# Patient Record
Sex: FEMALE | Race: BLACK OR AFRICAN AMERICAN | NOT HISPANIC OR LATINO | URBAN - METROPOLITAN AREA
[De-identification: names, ages, dates, MRNs, and addresses within clinical notes are randomized per-mention and may not be internally consistent; named-entity substitution may affect disease eponyms.]

---

## 2022-05-29 ENCOUNTER — INPATIENT (INPATIENT)
Facility: HOSPITAL | Age: 72
LOS: 10 days | Discharge: HOME | DRG: 65 | End: 2022-06-09
Attending: PSYCHIATRY & NEUROLOGY | Admitting: PSYCHIATRY & NEUROLOGY
Payer: MEDICAID

## 2022-05-29 VITALS
RESPIRATION RATE: 18 BRPM | SYSTOLIC BLOOD PRESSURE: 163 MMHG | HEART RATE: 59 BPM | DIASTOLIC BLOOD PRESSURE: 83 MMHG | OXYGEN SATURATION: 98 %

## 2022-05-29 LAB
ALBUMIN SERPL ELPH-MCNC: 4.5 G/DL — SIGNIFICANT CHANGE UP (ref 3.3–5)
ALP SERPL-CCNC: 99 U/L — SIGNIFICANT CHANGE UP (ref 40–120)
ALT FLD-CCNC: 17 U/L — SIGNIFICANT CHANGE UP (ref 10–45)
ANION GAP SERPL CALC-SCNC: 10 MMOL/L — SIGNIFICANT CHANGE UP (ref 5–17)
APTT BLD: 29.1 SEC — SIGNIFICANT CHANGE UP (ref 27.5–35.5)
AST SERPL-CCNC: 24 U/L — SIGNIFICANT CHANGE UP (ref 10–40)
BASOPHILS # BLD AUTO: 0.03 K/UL — SIGNIFICANT CHANGE UP (ref 0–0.2)
BASOPHILS NFR BLD AUTO: 0.5 % — SIGNIFICANT CHANGE UP (ref 0–2)
BILIRUB SERPL-MCNC: 0.5 MG/DL — SIGNIFICANT CHANGE UP (ref 0.2–1.2)
BUN SERPL-MCNC: 17 MG/DL — SIGNIFICANT CHANGE UP (ref 7–23)
CALCIUM SERPL-MCNC: 9.8 MG/DL — SIGNIFICANT CHANGE UP (ref 8.4–10.5)
CHLORIDE SERPL-SCNC: 105 MMOL/L — SIGNIFICANT CHANGE UP (ref 96–108)
CO2 SERPL-SCNC: 27 MMOL/L — SIGNIFICANT CHANGE UP (ref 22–31)
CREAT SERPL-MCNC: 1.17 MG/DL — SIGNIFICANT CHANGE UP (ref 0.5–1.3)
EGFR: 50 ML/MIN/1.73M2 — LOW
EOSINOPHIL # BLD AUTO: 0.07 K/UL — SIGNIFICANT CHANGE UP (ref 0–0.5)
EOSINOPHIL NFR BLD AUTO: 1.1 % — SIGNIFICANT CHANGE UP (ref 0–6)
GLUCOSE SERPL-MCNC: 85 MG/DL — SIGNIFICANT CHANGE UP (ref 70–99)
HCT VFR BLD CALC: 44.1 % — SIGNIFICANT CHANGE UP (ref 34.5–45)
HGB BLD-MCNC: 14.3 G/DL — SIGNIFICANT CHANGE UP (ref 11.5–15.5)
IMM GRANULOCYTES NFR BLD AUTO: 0.3 % — SIGNIFICANT CHANGE UP (ref 0–1.5)
INR BLD: 1.1 — SIGNIFICANT CHANGE UP (ref 0.88–1.16)
LYMPHOCYTES # BLD AUTO: 2.06 K/UL — SIGNIFICANT CHANGE UP (ref 1–3.3)
LYMPHOCYTES # BLD AUTO: 33.1 % — SIGNIFICANT CHANGE UP (ref 13–44)
MCHC RBC-ENTMCNC: 30.2 PG — SIGNIFICANT CHANGE UP (ref 27–34)
MCHC RBC-ENTMCNC: 32.4 GM/DL — SIGNIFICANT CHANGE UP (ref 32–36)
MCV RBC AUTO: 93.2 FL — SIGNIFICANT CHANGE UP (ref 80–100)
MONOCYTES # BLD AUTO: 0.42 K/UL — SIGNIFICANT CHANGE UP (ref 0–0.9)
MONOCYTES NFR BLD AUTO: 6.7 % — SIGNIFICANT CHANGE UP (ref 2–14)
NEUTROPHILS # BLD AUTO: 3.63 K/UL — SIGNIFICANT CHANGE UP (ref 1.8–7.4)
NEUTROPHILS NFR BLD AUTO: 58.3 % — SIGNIFICANT CHANGE UP (ref 43–77)
NRBC # BLD: 0 /100 WBCS — SIGNIFICANT CHANGE UP (ref 0–0)
PLATELET # BLD AUTO: 345 K/UL — SIGNIFICANT CHANGE UP (ref 150–400)
POTASSIUM SERPL-MCNC: 4 MMOL/L — SIGNIFICANT CHANGE UP (ref 3.5–5.3)
POTASSIUM SERPL-SCNC: 4 MMOL/L — SIGNIFICANT CHANGE UP (ref 3.5–5.3)
PROT SERPL-MCNC: 7.8 G/DL — SIGNIFICANT CHANGE UP (ref 6–8.3)
PROTHROM AB SERPL-ACNC: 13.1 SEC — SIGNIFICANT CHANGE UP (ref 10.5–13.4)
RBC # BLD: 4.73 M/UL — SIGNIFICANT CHANGE UP (ref 3.8–5.2)
RBC # FLD: 13.8 % — SIGNIFICANT CHANGE UP (ref 10.3–14.5)
SARS-COV-2 RNA SPEC QL NAA+PROBE: NEGATIVE — SIGNIFICANT CHANGE UP
SODIUM SERPL-SCNC: 142 MMOL/L — SIGNIFICANT CHANGE UP (ref 135–145)
TROPONIN T SERPL-MCNC: 0.01 NG/ML — SIGNIFICANT CHANGE UP (ref 0–0.01)
WBC # BLD: 6.23 K/UL — SIGNIFICANT CHANGE UP (ref 3.8–10.5)
WBC # FLD AUTO: 6.23 K/UL — SIGNIFICANT CHANGE UP (ref 3.8–10.5)

## 2022-05-29 PROCEDURE — 99291 CRITICAL CARE FIRST HOUR: CPT | Mod: 25

## 2022-05-29 PROCEDURE — 93010 ELECTROCARDIOGRAM REPORT: CPT

## 2022-05-29 PROCEDURE — 99221 1ST HOSP IP/OBS SF/LOW 40: CPT

## 2022-05-29 PROCEDURE — 70496 CT ANGIOGRAPHY HEAD: CPT | Mod: 26,MA

## 2022-05-29 PROCEDURE — 70498 CT ANGIOGRAPHY NECK: CPT | Mod: 26,MA

## 2022-05-29 PROCEDURE — 99291 CRITICAL CARE FIRST HOUR: CPT

## 2022-05-29 PROCEDURE — 71045 X-RAY EXAM CHEST 1 VIEW: CPT | Mod: 26

## 2022-05-29 PROCEDURE — 0042T: CPT

## 2022-05-29 RX ORDER — SODIUM CHLORIDE 9 MG/ML
1000 INJECTION INTRAMUSCULAR; INTRAVENOUS; SUBCUTANEOUS ONCE
Refills: 0 | Status: COMPLETED | OUTPATIENT
Start: 2022-05-29 | End: 2022-05-29

## 2022-05-29 RX ORDER — NOREPINEPHRINE BITARTRATE/D5W 8 MG/250ML
0.05 PLASTIC BAG, INJECTION (ML) INTRAVENOUS
Qty: 8 | Refills: 0 | Status: DISCONTINUED | OUTPATIENT
Start: 2022-05-29 | End: 2022-06-01

## 2022-05-29 RX ORDER — HEPARIN SODIUM 5000 [USP'U]/ML
6500 INJECTION INTRAVENOUS; SUBCUTANEOUS ONCE
Refills: 0 | Status: DISCONTINUED | OUTPATIENT
Start: 2022-05-29 | End: 2022-05-29

## 2022-05-29 RX ORDER — HEPARIN SODIUM 5000 [USP'U]/ML
1000 INJECTION INTRAVENOUS; SUBCUTANEOUS
Qty: 25000 | Refills: 0 | Status: DISCONTINUED | OUTPATIENT
Start: 2022-05-29 | End: 2022-05-29

## 2022-05-29 RX ORDER — DEXTROSE 50 % IN WATER 50 %
25 SYRINGE (ML) INTRAVENOUS ONCE
Refills: 0 | Status: COMPLETED | OUTPATIENT
Start: 2022-05-29 | End: 2022-05-29

## 2022-05-29 RX ORDER — HEPARIN SODIUM 5000 [USP'U]/ML
3000 INJECTION INTRAVENOUS; SUBCUTANEOUS EVERY 6 HOURS
Refills: 0 | Status: DISCONTINUED | OUTPATIENT
Start: 2022-05-29 | End: 2022-05-29

## 2022-05-29 RX ORDER — HEPARIN SODIUM 5000 [USP'U]/ML
6500 INJECTION INTRAVENOUS; SUBCUTANEOUS EVERY 6 HOURS
Refills: 0 | Status: DISCONTINUED | OUTPATIENT
Start: 2022-05-29 | End: 2022-05-29

## 2022-05-29 RX ORDER — HEPARIN SODIUM 5000 [USP'U]/ML
1000 INJECTION INTRAVENOUS; SUBCUTANEOUS
Qty: 25000 | Refills: 0 | Status: DISCONTINUED | OUTPATIENT
Start: 2022-05-29 | End: 2022-05-30

## 2022-05-29 RX ORDER — HEPARIN SODIUM 5000 [USP'U]/ML
INJECTION INTRAVENOUS; SUBCUTANEOUS
Qty: 25000 | Refills: 0 | Status: DISCONTINUED | OUTPATIENT
Start: 2022-05-29 | End: 2022-05-29

## 2022-05-29 RX ADMIN — HEPARIN SODIUM 10 UNIT(S)/HR: 5000 INJECTION INTRAVENOUS; SUBCUTANEOUS at 20:32

## 2022-05-29 RX ADMIN — Medication 25 MILLILITER(S): at 18:51

## 2022-05-29 RX ADMIN — Medication 7.82 MICROGRAM(S)/KG/MIN: at 23:05

## 2022-05-29 RX ADMIN — SODIUM CHLORIDE 1000 MILLILITER(S): 9 INJECTION INTRAMUSCULAR; INTRAVENOUS; SUBCUTANEOUS at 22:19

## 2022-05-29 NOTE — ED ADULT NURSE NOTE - OBJECTIVE STATEMENT
GORDO, 71y F, notified for stroke code by EMS, presents to the ED with c/o left sided weakness, left sided facial droop and slurred speech, witnessed by son. As per son at bedside, last known well witnessed at 1430. Minor facial drop noted to left side, and drift on left arm.  Denies chest pain, SOB, LOC, dizziness, lightheadedness, vision changes, numbness, tingling. Pt A&Ox4, speaking in clear/full sentences. Symptoms improving in ED at this time. Immediately brought to CT scan. GORDO, 71y F, notified for stroke code by EMS, presents to the ED with c/o left sided weakness, left sided facial droop and slurred speech, witnessed by son. As per son at bedside, last known well witnessed at 1430. Minor facial drop noted to left side, and drift on left arm.  Denies chest pain, SOB, LOC, dizziness, lightheadedness, vision changes, numbness, tingling, blood thinner use. Pt A&Ox4, speaking in clear/full sentences. Symptoms improving in ED at this time. Immediately brought to CT scan.

## 2022-05-29 NOTE — CONSULT NOTE ADULT - SUBJECTIVE AND OBJECTIVE BOX
**STROKE CODE CONSULT NOTE**    Last known well time/Time of onset of symptoms: 5/29 2:30PM    HPI: 71y Female with no past medical history ith     T(C): --  HR: 59 (05-29-22 @ 17:30) (59 - 59)  BP: 163/83 (05-29-22 @ 17:30) (163/83 - 163/83)  RR: 18 (05-29-22 @ 17:30) (18 - 18)  SpO2: 98% (05-29-22 @ 17:30) (98% - 98%)    PAST MEDICAL & SURGICAL HISTORY:      FAMILY HISTORY:      SOCIAL HISTORY:   Patient lives with *** at ***.   Smoking status:  Drinking:  Drug Use:     ROS: ***  Constitutional: No fever, weight loss or fatigue  Eyes: No eye pain, visual disturbances, or discharge  ENMT:  No difficulty hearing, tinnitus; No sinus or throat pain  Neck: No pain or stiffness  Respiratory: No cough, wheezing, chills or hemoptysis  Cardiovascular: No chest pain, palpitations, shortness of breath, or leg swelling  Gastrointestinal: No abdominal pain. No nausea, vomiting or hematemesis; No diarrhea or constipation. Nohematochezia.  Genitourinary: No dysuria, frequency, hematuria or incontinence  Neurological: As per HPI  Skin: No itching, burning, rashes or lesions   Endocrine: No heat or cold intolerance; No hair loss  Musculoskeletal: No joint pain or swelling; No muscle, back or extremity pain  Heme/Lymph: No easy bruising or bleeding gums    MEDICATIONS  (STANDING):  dextrose 50% Injectable 25 milliLiter(s) IV Push Once    MEDICATIONS  (PRN):    Allergies    No Known Allergies    Intolerances      Vital Signs Last 24 Hrs  T(C): --  T(F): --  HR: 59 (29 May 2022 17:30) (59 - 59)  BP: 163/83 (29 May 2022 17:30) (163/83 - 163/83)  BP(mean): --  RR: 18 (29 May 2022 17:30) (18 - 18)  SpO2: 98% (29 May 2022 17:30) (98% - 98%)    Physical exam:  Constitutional: No acute distress, conversant  Eyes: Anicteric sclerae, moist conjunctivae, see below for CNs  Neck: trachea midline, FROM, supple, no thyromegaly or lymphadenopathy  Cardiovascular: Regular rate and rhythm, no murmurs, rubs, or gallops. No carotid bruits.   Pulmonary: Anterior breath sounds clear bilaterally, no crackles or wheezing. No use of accessory muscles  GI: Abdomen soft, non-distended, non-tender  Extremities: Radial and DP pulses +2, no edema    Neurologic:  -Mental status: Awake, alert, oriented to person, place, and time. Speech is fluent with intact naming, repetition, and comprehension, no dysarthria. Recent and remote memory intact. Follows commands. Attention/concentration intact. Fund of knowledge appropriate.  -Cranial nerves:   II: Visual fields are full to confrontation.  III, IV, VI: Extraocular movements are intact without nystagmus. Pupils equally round and reactive to light  V:  Facial sensation V1-V3 equal and intact   VII: Face is symmetric with normal eye closure and smile  VIII: Hearing is bilaterally intact to finger rub  IX, X: Uvula is midline and soft palate rises symmetrically  XI: Head turning and shoulder shrug are intact.  XII: Tongue protrudes midline  Motor: Normal bulk and tone. No pronator drift. Strength bilateral upper extremity 5/5, bilateral lower extremities 5/5.  Rapid alternating movements intact and symmetric  Sensation: Intact to light touch bilaterally. No neglect or extinction on double simultaneous testing.  Coordination: No dysmetria on finger-to-nose and heel-to-shin bilaterally  Reflexes: Downgoing toes bilaterally   Gait: Narrow gait and steady    NIHSS: **** ASPECT Score: ***** ICH Score: ****** (GCS)    Fingerstick Blood Glucose: CAPILLARY BLOOD GLUCOSE  78 (29 May 2022 18:13)      POCT Blood Glucose.: 78 mg/dL (29 May 2022 17:31)    LABS:                        14.3   6.23  )-----------( 345      ( 29 May 2022 17:36 )             44.1     05-29    142  |  105  |  17  ----------------------------<  85  4.0   |  27  |  1.17    Ca    9.8      29 May 2022 17:36    TPro  7.8  /  Alb  4.5  /  TBili  0.5  /  DBili  x   /  AST  24  /  ALT  17  /  AlkPhos  99  05-29    PT/INR - ( 29 May 2022 17:36 )   PT: 13.1 sec;   INR: 1.10          PTT - ( 29 May 2022 17:36 )  PTT:29.1 sec  CARDIAC MARKERS ( 29 May 2022 17:36 )  x     / 0.01 ng/mL / x     / x     / x              RADIOLOGY & ADDITIONAL STUDIES:      -----------------------------------------------------------------------------------------------------------------  IV-tPA (Y/N):    ***                              Bolus time:    Alteplase Dose Verification w/ RN:  Reason IV-tPA not given: ***    **STROKE CODE CONSULT NOTE**    Last known well time/Time of onset of symptoms: 5/29 2:30PM    HPI: 71y Female with no past medical history, not on any meds, just got off a long flight from [place] this past Wednesday presents with left sided hemiparesis. LKW 2:30 PM today when she was with son. Then around 4pm, family found patient with profound left sided weakness. EMS was called. BP with /100. Per EMS, no movement in the left arm nor leg, left facial droop, and right gaze preference and could not overcome.     On presentation to ED, 163/83 with improving exam. AOx3, answering questions and following commands. Right gaze preference and can easily overcome when attention brought to the left side. LUE with drift, does not hit bed. Minor left facial droop (although son says patient's face at baseline), extinguishes to DST on the left. NIHSS 4.    T(C): --  HR: 59 (05-29-22 @ 17:30) (59 - 59)  BP: 163/83 (05-29-22 @ 17:30) (163/83 - 163/83)  RR: 18 (05-29-22 @ 17:30) (18 - 18)  SpO2: 98% (05-29-22 @ 17:30) (98% - 98%)    PAST MEDICAL & SURGICAL HISTORY:      FAMILY HISTORY:      SOCIAL HISTORY:   Patient lives with *** at ***.   Smoking status:  Drinking:  Drug Use:     ROS: ***  Constitutional: No fever, weight loss or fatigue  Eyes: No eye pain, visual disturbances, or discharge  ENMT:  No difficulty hearing, tinnitus; No sinus or throat pain  Neck: No pain or stiffness  Respiratory: No cough, wheezing, chills or hemoptysis  Cardiovascular: No chest pain, palpitations, shortness of breath, or leg swelling  Gastrointestinal: No abdominal pain. No nausea, vomiting or hematemesis; No diarrhea or constipation. Nohematochezia.  Genitourinary: No dysuria, frequency, hematuria or incontinence  Neurological: As per HPI  Skin: No itching, burning, rashes or lesions   Endocrine: No heat or cold intolerance; No hair loss  Musculoskeletal: No joint pain or swelling; No muscle, back or extremity pain  Heme/Lymph: No easy bruising or bleeding gums    MEDICATIONS  (STANDING):  dextrose 50% Injectable 25 milliLiter(s) IV Push Once    MEDICATIONS  (PRN):    Allergies    No Known Allergies    Intolerances      Vital Signs Last 24 Hrs  T(C): --  T(F): --  HR: 59 (29 May 2022 17:30) (59 - 59)  BP: 163/83 (29 May 2022 17:30) (163/83 - 163/83)  BP(mean): --  RR: 18 (29 May 2022 17:30) (18 - 18)  SpO2: 98% (29 May 2022 17:30) (98% - 98%)    Physical exam:  Constitutional: No acute distress, conversant  Eyes: Anicteric sclerae, moist conjunctivae, see below for CNs  Neck: trachea midline, FROM, supple, no thyromegaly or lymphadenopathy  Cardiovascular: Regular rate and rhythm, no murmurs, rubs, or gallops. No carotid bruits.   Pulmonary: Anterior breath sounds clear bilaterally, no crackles or wheezing. No use of accessory muscles  GI: Abdomen soft, non-distended, non-tender  Extremities: Radial and DP pulses +2, no edema    Neurologic:  -Mental status: Awake, alert, oriented to person, place, and time. Speech is fluent with intact naming, repetition, and comprehension, no dysarthria. Recent and remote memory intact. Follows commands. Attention/concentration intact. Fund of knowledge appropriate.  -Cranial nerves:   II: Visual fields are full to confrontation.  III, IV, VI: Extraocular movements are intact without nystagmus. Pupils equally round and reactive to light  V:  Facial sensation V1-V3 equal and intact   VII: Face is symmetric with normal eye closure and smile  VIII: Hearing is bilaterally intact to finger rub  IX, X: Uvula is midline and soft palate rises symmetrically  XI: Head turning and shoulder shrug are intact.  XII: Tongue protrudes midline  Motor: Normal bulk and tone. No pronator drift. Strength bilateral upper extremity 5/5, bilateral lower extremities 5/5.  Rapid alternating movements intact and symmetric  Sensation: Intact to light touch bilaterally. No neglect or extinction on double simultaneous testing.  Coordination: No dysmetria on finger-to-nose and heel-to-shin bilaterally  Reflexes: Downgoing toes bilaterally   Gait: Narrow gait and steady    NIHSS: **** ASPECT Score: ***** ICH Score: ****** (GCS)    Fingerstick Blood Glucose: CAPILLARY BLOOD GLUCOSE  78 (29 May 2022 18:13)      POCT Blood Glucose.: 78 mg/dL (29 May 2022 17:31)    LABS:                        14.3   6.23  )-----------( 345      ( 29 May 2022 17:36 )             44.1     05-29    142  |  105  |  17  ----------------------------<  85  4.0   |  27  |  1.17    Ca    9.8      29 May 2022 17:36    TPro  7.8  /  Alb  4.5  /  TBili  0.5  /  DBili  x   /  AST  24  /  ALT  17  /  AlkPhos  99  05-29    PT/INR - ( 29 May 2022 17:36 )   PT: 13.1 sec;   INR: 1.10          PTT - ( 29 May 2022 17:36 )  PTT:29.1 sec  CARDIAC MARKERS ( 29 May 2022 17:36 )  x     / 0.01 ng/mL / x     / x     / x              RADIOLOGY & ADDITIONAL STUDIES:      -----------------------------------------------------------------------------------------------------------------  IV-tPA (Y/N):    ***                              Bolus time:    Alteplase Dose Verification w/ RN:  Reason IV-tPA not given: ***    **STROKE CODE CONSULT NOTE**    Last known well time/Time of onset of symptoms: 5/29 2:30PM    HPI: 71y Female with no past medical history, not on any meds, just got off a long flight from [place] this past Wednesday presents with left sided hemiparesis. LKW 2:30 PM today when she was with son (Axel [phone], at bedside). Then around 4pm, family found patient with profound left sided weakness. EMS was called. BP with /100. Per EMS, no movement in the left arm nor leg, left facial droop, and right gaze preference and could not overcome.     On presentation to ED, 163/83 with improving exam. AOx3, answering questions and following commands. Right gaze preference and can easily overcome when attention brought to the left side. LUE with drift, does not hit bed. Minor left facial droop (although son says patient's face at baseline), extinguishes to DST on the left. NIHSS 4. CTH with no hemorrhage. CTP with elevated Tmax in the R M2 region. CTA head and neck with proximal occlusion of two posterior right M2. Patient is a tpa candidate as CTH is negative for hemorrhage, within window and although improving exam, with disabling deficits. While mixing tpa, patient exam improved to an NIHSS 0. Drift no longer present, gaze midline and attending bilaterally without preference, sensation intact b/l without extinguishing on DST. Decision made to not give tpa due to return to baseline. Endovascular neurosurgery consulted regarding RM2 occlusion. Patient currently not a thrombectomy candidate due to NIHSS 0 with no focal deficits.     Patient denies CP, SOB, prior episodes of weakness and numbness. She has b/l LE flushing and "heat" occasionally, but never weakness, numbness nor paresthesia.     T(C): --  HR: 59 (05-29-22 @ 17:30) (59 - 59)  BP: 163/83 (05-29-22 @ 17:30) (163/83 - 163/83)  RR: 18 (05-29-22 @ 17:30) (18 - 18)  SpO2: 98% (05-29-22 @ 17:30) (98% - 98%)    PAST MEDICAL & SURGICAL HISTORY:      FAMILY HISTORY:      SOCIAL HISTORY:   Patient currently visiting from [place], staying with son.   Smoking status:  Drinking:  Drug Use:     ROS:   Constitutional: No fever, weight loss or fatigue  Eyes: No eye pain, visual disturbances, or discharge  ENMT:  No difficulty hearing, tinnitus; No sinus or throat pain  Neck: No pain or stiffness  Respiratory: No cough, wheezing, chills or hemoptysis  Cardiovascular: No chest pain, palpitations, shortness of breath, or leg swelling  Gastrointestinal: No abdominal pain. No nausea, vomiting or hematemesis; No diarrhea or constipation. Nohematochezia.  Genitourinary: No dysuria, frequency, hematuria or incontinence  Neurological: As per HPI    MEDICATIONS  (STANDING):  dextrose 50% Injectable 25 milliLiter(s) IV Push Once    MEDICATIONS  (PRN):    Allergies    No Known Allergies    Intolerances      Vital Signs Last 24 Hrs  T(C): --  T(F): --  HR: 59 (29 May 2022 17:30) (59 - 59)  BP: 163/83 (29 May 2022 17:30) (163/83 - 163/83)  BP(mean): --  RR: 18 (29 May 2022 17:30) (18 - 18)  SpO2: 98% (29 May 2022 17:30) (98% - 98%)    Physical exam:  Constitutional: No acute distress, conversant  Eyes: Anicteric sclerae, moist conjunctivae, see below for CNs  Neck: trachea midline, FROM, supple, no thyromegaly or lymphadenopathy  Cardiovascular: Some PVCs on tele, no afib.   Pulmonary: No increased work of breathing. No use of accessory muscles  GI: Abdomen soft, non-distended, non-tender  Extremities: no edema    Neurologic: on immediate presentation to the ED  -Mental status: Awake, alert, oriented to person, place, and time. Speech is fluent with intact naming, repetition, and comprehension, no dysarthria. Recent and remote memory intact. Follows commands. Attention/concentration intact. Fund of knowledge appropriate.  -Cranial nerves:   II: Visual fields are full to confrontation.  III, IV, VI: Extraocular movements are intact without nystagmus. RIght gaze preference but can cross and bury to the left. Can attend to the left easily when attention brought to that side. Pupils equally round and reactive to light  V:  Facial sensation V1-V3 equal and intact   VII: Mild left facial droop   VIII: Hearing is grossly intact.   Motor: Normal bulk and tone. LUE drift, does not hit bed. All other extremities antigravity without drift.   Sensation: Intact to light touch bilaterally. Extinguishes on the left with DST  Coordination: No dysmetria on finger-to-nose bilaterally  NIHSS: 4      Neurologic: after CT scans, in resus room  -Mental status: Awake, alert, oriented to person, place, and time. Speech is fluent with intact naming, repetition, and comprehension, no dysarthria. Recent and remote memory intact. Follows commands. Attention/concentration intact. Fund of knowledge appropriate.  -Cranial nerves:   II: Visual fields are full to confrontation.  III, IV, VI: Extraocular movements are intact without nystagmus. Pupils equally round and reactive to light  V:  Facial sensation V1-V3 equal and intact   VII: Face is symmetric with normal eye closure and smile  VIII: Hearing is grossly intact bilaterally  XII: Tongue protrudes midline  Motor: Normal bulk and tone. No pronator drift. Strength bilateral upper extremity 5/5, bilateral lower extremities 5/5.  Sensation: Intact to light touch bilaterally. No neglect or extinction on double simultaneous testing.  Coordination: No dysmetria on finger-to-nose bilaterally  NIHSS: 0        Fingerstick Blood Glucose: CAPILLARY BLOOD GLUCOSE  78 (29 May 2022 18:13)      POCT Blood Glucose.: 78 mg/dL (29 May 2022 17:31)    LABS:                        14.3   6.23  )-----------( 345      ( 29 May 2022 17:36 )             44.1     05-29    142  |  105  |  17  ----------------------------<  85  4.0   |  27  |  1.17    Ca    9.8      29 May 2022 17:36    TPro  7.8  /  Alb  4.5  /  TBili  0.5  /  DBili  x   /  AST  24  /  ALT  17  /  AlkPhos  99  05-29    PT/INR - ( 29 May 2022 17:36 )   PT: 13.1 sec;   INR: 1.10          PTT - ( 29 May 2022 17:36 )  PTT:29.1 sec  CARDIAC MARKERS ( 29 May 2022 17:36 )  x     / 0.01 ng/mL / x     / x     / x              RADIOLOGY & ADDITIONAL STUDIES:    < from: CT Brain Stroke Protocol (05.29.22 @ 17:43) >  IMPRESSION:  1. No acute intracranial abnormality.  2. ASPECTS ([place] Stroke Program Early CT Score) is 10.    < end of copied text >  < from: CT Perfusion w/ Maps w/ IV Cont (05.29.22 @ 17:43) >  IMPRESSION:  1. Moderate size area of right MCA distribution acute ischemia in the  distribution of the M2 branch occlusions.  2. No CT perfusion evidence of acute infarction.    < end of copied text >  < from: CT Angio Head w/ IV Cont (05.29.22 @ 17:43) >  IMPRESSION:  1. Proximal occlusion of two posterior right M2 branches consistent with  thrombosis. Paucity of flow in the posterior right MCA distal to this   level.  2. Otherwise patent intracranial arterial system.    < end of copied text >  < from: CT Angio Neck w/ IV Cont (05.29.22 @ 17:43) >  IMPRESSION:  No stenosis or occlusion.    < end of copied text >    -----------------------------------------------------------------------------------------------------------------  IV-tPA (Y/N):    N                              Bolus time:    Alteplase Dose Verification w/ RN:  Reason IV-tPA not given: rapidly improving exam, back to baseline

## 2022-05-29 NOTE — ED PROVIDER NOTE - PHYSICAL EXAMINATION
CONSTITUTIONAL: Well-appearing; in no apparent distress.   HEAD: Normocephalic; atraumatic.   EYES:  conjunctiva and sclera clear  ENT: normal nose; no rhinorrhea; normal pharynx with no erythema or lesions.   NECK: Supple; non-tender;   CARDIOVASCULAR: Normal S1, S2; no murmurs, rubs, or gallops. Regular rate and rhythm.   RESPIRATORY: Breathing easily; breath sounds clear and equal bilaterally; no wheezes, rhonchi, or rales.  GI: Soft; non-distended; non-tender; no palpable organomegaly.   EXT: No cyanosis or edema; N/V intact  SKIN: Normal for age and race; warm; dry; good turgor; no apparent lesions or rash.   NEURO: ? slight L facial droop. Slight drift to LUE with mild tremor to L hand. Sensations intact and equal b/l.   PSYCHOLOGICAL: The patient’s mood and manner are appropriate. CONSTITUTIONAL: Well-appearing; in no apparent distress.   HEAD: Normocephalic; atraumatic.   EYES:  conjunctiva and sclera clear  ENT: Airway patent.   NECK: Supple; non-tender;   CARDIOVASCULAR: Normal S1, S2; no murmurs, rubs, or gallops. Regular rate and rhythm.   RESPIRATORY: Breathing easily; breath sounds clear and equal bilaterally; no wheezes, rhonchi, or rales.  GI: Soft; non-distended; non-tender; no palpable organomegaly.   EXT: No cyanosis or edema; N/V intact  SKIN: Normal for age and race; warm; dry; good turgor; no apparent lesions or rash.   NEURO: A&O x 3, ? slight L facial droop. Slight drift to LUE with mild tremor to L hand. Motor and sensation otherwise intact and equal b/l.   PSYCHOLOGICAL: The patient’s mood and manner are appropriate.

## 2022-05-29 NOTE — PATIENT PROFILE ADULT - FALL HARM RISK - HARM RISK INTERVENTIONS

## 2022-05-29 NOTE — H&P ADULT - HISTORY OF PRESENT ILLNESS
HPI: 71y Female with no past medical history, not on any meds, just got off a long flight from [place] this past Wednesday presents with left sided hemiparesis. LKW 2:30 PM today when she was with son (Axel [phone], at bedside). Then around 4pm, family found patient with profound left sided weakness. EMS was called. BP with /100. Per EMS, no movement in the left arm nor leg, left facial droop, and right gaze preference and could not overcome.     On presentation to ED, 163/83 with improving exam. AOx3, answering questions and following commands. Right gaze preference and can easily overcome when attention brought to the left side. LUE with drift, does not hit bed. Minor left facial droop (although son says patient's face at baseline), extinguishes to DST on the left. NIHSS 4. CTH with no hemorrhage. CTP with elevated Tmax in the R M2 region. CTA head and neck with proximal occlusion of two posterior right M2. Patient is a tpa candidate as CTH is negative for hemorrhage, within window and although improving exam, with disabling deficits. While mixing tpa, patient exam improved to an NIHSS 0. Drift no longer present, gaze midline and attending bilaterally without preference, sensation intact b/l without extinguishing on DST. Decision made to not give tpa due to return to baseline. Endovascular neurosurgery consulted regarding RM2 occlusion. Patient currently not a thrombectomy candidate due to NIHSS 0 with no focal deficits.     Patient denies CP, SOB, prior episodes of weakness and numbness. She has b/l LE flushing and "heat" occasionally, but never weakness, numbness nor paresthesia.     T(C): --  HR: 59 (05-29-22 @ 17:30) (59 - 59)  BP: 163/83 (05-29-22 @ 17:30) (163/83 - 163/83)  RR: 18 (05-29-22 @ 17:30) (18 - 18)  SpO2: 98% (05-29-22 @ 17:30) (98% - 98%)    PAST MEDICAL & SURGICAL HISTORY:      FAMILY HISTORY:      SOCIAL HISTORY:   Patient currently visiting from [place], staying with son.   Smoking status:  Drinking:  Drug Use:     ROS:   Constitutional: No fever, weight loss or fatigue  Eyes: No eye pain, visual disturbances, or discharge  ENMT:  No difficulty hearing, tinnitus; No sinus or throat pain  Neck: No pain or stiffness  Respiratory: No cough, wheezing, chills or hemoptysis  Cardiovascular: No chest pain, palpitations, shortness of breath, or leg swelling  Gastrointestinal: No abdominal pain. No nausea, vomiting or hematemesis; No diarrhea or constipation. Nohematochezia.  Genitourinary: No dysuria, frequency, hematuria or incontinence  Neurological: As per HPI    MEDICATIONS  (STANDING):  dextrose 50% Injectable 25 milliLiter(s) IV Push Once    MEDICATIONS  (PRN):    Allergies    No Known Allergies    Intolerances      Vital Signs Last 24 Hrs  T(C): --  T(F): --  HR: 59 (29 May 2022 17:30) (59 - 59)  BP: 163/83 (29 May 2022 17:30) (163/83 - 163/83)  BP(mean): --  RR: 18 (29 May 2022 17:30) (18 - 18)  SpO2: 98% (29 May 2022 17:30) (98% - 98%)    Physical exam:  Constitutional: No acute distress, conversant  Eyes: Anicteric sclerae, moist conjunctivae, see below for CNs  Neck: trachea midline, FROM, supple, no thyromegaly or lymphadenopathy  Cardiovascular: Some PVCs on tele, no afib.   Pulmonary: No increased work of breathing. No use of accessory muscles  GI: Abdomen soft, non-distended, non-tender  Extremities: no edema    Neurologic: on immediate presentation to the ED  -Mental status: Awake, alert, oriented to person, place, and time. Speech is fluent with intact naming, repetition, and comprehension, no dysarthria. Recent and remote memory intact. Follows commands. Attention/concentration intact. Fund of knowledge appropriate.  -Cranial nerves:   II: Visual fields are full to confrontation.  III, IV, VI: Extraocular movements are intact without nystagmus. RIght gaze preference but can cross and bury to the left. Can attend to the left easily when attention brought to that side. Pupils equally round and reactive to light  V:  Facial sensation V1-V3 equal and intact   VII: Mild left facial droop   VIII: Hearing is grossly intact.   Motor: Normal bulk and tone. LUE drift, does not hit bed. All other extremities antigravity without drift.   Sensation: Intact to light touch bilaterally. Extinguishes on the left with DST  Coordination: No dysmetria on finger-to-nose bilaterally  NIHSS: 4      Neurologic: after CT scans, in resus room  -Mental status: Awake, alert, oriented to person, place, and time. Speech is fluent with intact naming, repetition, and comprehension, no dysarthria. Recent and remote memory intact. Follows commands. Attention/concentration intact. Fund of knowledge appropriate.  -Cranial nerves:   II: Visual fields are full to confrontation.  III, IV, VI: Extraocular movements are intact without nystagmus. Pupils equally round and reactive to light  V:  Facial sensation V1-V3 equal and intact   VII: Face is symmetric with normal eye closure and smile  VIII: Hearing is grossly intact bilaterally  XII: Tongue protrudes midline  Motor: Normal bulk and tone. No pronator drift. Strength bilateral upper extremity 5/5, bilateral lower extremities 5/5.  Sensation: Intact to light touch bilaterally. No neglect or extinction on double simultaneous testing.  Coordination: No dysmetria on finger-to-nose bilaterally  NIHSS: 0        Fingerstick Blood Glucose: CAPILLARY BLOOD GLUCOSE  78 (29 May 2022 18:13)      POCT Blood Glucose.: 78 mg/dL (29 May 2022 17:31)    LABS:                        14.3   6.23  )-----------( 345      ( 29 May 2022 17:36 )             44.1     05-29    142  |  105  |  17  ----------------------------<  85  4.0   |  27  |  1.17    Ca    9.8      29 May 2022 17:36    TPro  7.8  /  Alb  4.5  /  TBili  0.5  /  DBili  x   /  AST  24  /  ALT  17  /  AlkPhos  99  05-29    PT/INR - ( 29 May 2022 17:36 )   PT: 13.1 sec;   INR: 1.10          PTT - ( 29 May 2022 17:36 )  PTT:29.1 sec  CARDIAC MARKERS ( 29 May 2022 17:36 )  x     / 0.01 ng/mL / x     / x     / x              RADIOLOGY & ADDITIONAL STUDIES:    < from: CT Brain Stroke Protocol (05.29.22 @ 17:43) >  IMPRESSION:  1. No acute intracranial abnormality.  2. ASPECTS ([place]ta Stroke Program Early CT Score) is 10.    < end of copied text >  < from: CT Perfusion w/ Maps w/ IV Cont (05.29.22 @ 17:43) >  IMPRESSION:  1. Moderate size area of right MCA distribution acute ischemia in the  distribution of the M2 branch occlusions.  2. No CT perfusion evidence of acute infarction.    < end of copied text >  < from: CT Angio Head w/ IV Cont (05.29.22 @ 17:43) >  IMPRESSION:  1. Proximal occlusion of two posterior right M2 branches consistent with  thrombosis. Paucity of flow in the posterior right MCA distal to this   level.  2. Otherwise patent intracranial arterial system.    < end of copied text >  < from: CT Angio Neck w/ IV Cont (05.29.22 @ 17:43) >  IMPRESSION:  No stenosis or occlusion.    < end of copied text >    -----------------------------------------------------------------------------------------------------------------  IV-tPA (Y/N):    N                              Bolus time:    Alteplase Dose Verification w/ RN:  Reason IV-tPA not given: rapidly improving exam, back to baseline

## 2022-05-29 NOTE — CONSULT NOTE ADULT - ASSESSMENT
71y Female with no past medical history, not on any meds, just got off a long flight from [place] this past Wednesday presents with left sided hemiparesis. LKW 2:30 PM 5/29 when she was with son and was found with left sided weakness around 4 pm. On immediate presentation to the ED, NIHSS 4 which improved to NIHSS 0 after CTH scans. CTH with no hemorrhage. CTP with elevated Tmax in the R M2 region. CTA head and neck with proximal occlusion of two posterior right M2. Patient no longer a tap candidate due to return to baseline. Discussed case with neurovascular. Patient not a thrombectomy candidate at this time.     Discussed plan with patient and son to admit to ICU for close neuro monitoring and possible intervention if she were to neurologically change and IV medication treatment to treat occlusion. Patient does not wish to be admitted to the ICU because she is currently doing well, back at baseline. Educated patient that while she definitely improved, she has a blood clot that will cause her to have a stroke and is a high risk for disabling deficits if the vessel were to occlude or clot to embolize. Also emphasized that she will need blood thinner through the IV and aspirin or any other PO medication would not be enough. In addition, she would still need to be admitted for a stroke work up. Patient would like to discuss with son and other children regarding admission. Some concerns that she has is the cost (as she is visiting and has no insurance) and questions the necessity of ICU.     If patient agrees with admission, plan for:  - q1 neuro checks  - NPO  - hept gtt PTT goal 50-70  - SBP goal 140-180  - SCDs  - will eventually need MRI, TTE with bubble. May need LE doppler, hypercoagulable panel if rest of work up unremarkable.     Stroke risk factors  - A1C:   - LDL:     Discussed with Neurology Attending Dr. Larios    71y Female with no past medical history, not on any meds, just got off a long flight from [place] this past Wednesday presents with left sided hemiparesis. LKW 2:30 PM 5/29 when she was with son and was found with left sided weakness around 4 pm. On immediate presentation to the ED, NIHSS 4 which improved to NIHSS 0 after CTH scans. CTH with no hemorrhage. CTP with elevated Tmax in the R M2 region. CTA head and neck with proximal occlusion of two posterior right M2. Patient no longer a tap candidate due to return to baseline. Discussed case with neurovascular. Patient not a thrombectomy candidate at this time.     Discussed plan with patient and son to admit to ICU for close neuro monitoring and possible intervention if she were to neurologically change and IV medication treatment to treat occlusion. Patient does not wish to be admitted to the ICU because she is currently doing well, back at baseline. Educated patient that while she definitely improved, she has a blood clot that will cause her to have a stroke and is a high risk for disabling deficits if the vessel were to occlude or clot to embolize. Also emphasized that she will need blood thinner through the IV and aspirin or any other PO medication would not be enough. In addition, she would still need to be admitted for a stroke work up. Patient would like to discuss with son and other children regarding admission. Some concerns that she has is the cost (as she is visiting and has no insurance) and questions the necessity of ICU.     If patient agrees with admission, plan for:  - q1 neuro checks  - NPO  - hept gtt PTT goal 50-70  - SBP goal 140-180, start fluids or pressors if need to maintain blood pressure goal  - head of bed flat  - SCDs  - will eventually need MRI, TTE with bubble. May need LE doppler, hypercoagulable panel if rest of work up unremarkable.     Stroke risk factors  - A1C:   - LDL:     Discussed with Neurology Attending Dr. Larios    71y Female with no past medical history, not on any meds, just got off a long flight from [place] this past Wednesday presents with left sided hemiparesis. LKW 2:30 PM 5/29 when she was with son and was found with left sided weakness around 4 pm. On immediate presentation to the ED, NIHSS 4 which improved to NIHSS 0 after CTH scans. CTH with no hemorrhage. CTP with elevated Tmax in the R M2 region. CTA head and neck with proximal occlusion of two posterior right M2. Patient no longer a tap candidate due to return to baseline. Discussed case with neurovascular. Patient not a thrombectomy candidate at this time.     Discussed plan with patient and son to admit to ICU for close neuro monitoring and possible intervention if she were to neurologically change and IV medication treatment to treat occlusion. Patient initially did not wish to be admitted to the ICU because she is currently doing well, back at baseline. Educated patient that while she definitely improved, she has a blood clot that will cause her to have a stroke and is a high risk for disabling deficits if the vessel were to occlude or clot to embolize. Also emphasized that she will need blood thinner through the IV and aspirin or any other PO medication would not be enough. In addition, she would still need to be admitted for a stroke work up. Patient discusssed with son and other children regarding admission. Also had concerns of cost (as she is visiting and has no insurance) and questions the necessity of ICU. After extensive conversation with son (Maxx) at bedside and patient, patient is willing to be admitted to ICU for close neuro monitoring for thrombectomy watch.     1) Admit to MICU for thrombectomy watch   - q1 neuro checks  - NPO  - hept gtt PTT goal 50-70  - STAT HCT, CTA H/N, CTP if acute change in neuro status   - SBP goal 140-180, start fluids or pressors if need to maintain blood pressure goal  - head of bed flat, bedrest   - SCDs  - Stroke Education    2) Labs: Obtain Hemoglobin A1c, Lipid profile set, and TSH    3) Other tests:  - will eventually need MRI, TTE with bubble. May need LE doppler, hypercoagulable panel if rest of work up unremarkable.   - PT/OT order  - Stroke education    4)DVT ppx - SCDs, hep gtt    Discussed with Neurology Attending Dr. Larios

## 2022-05-29 NOTE — ED PROVIDER NOTE - CLINICAL SUMMARY MEDICAL DECISION MAKING FREE TEXT BOX
Impression - 71F brought in for L sided weakness/numbness, slurred speech, facial droop. Stroke code activated on arrival to ED and evaluated by stroke team. CT head negative for acute bleed or infarct. Given improvement of neurological symptoms with minimal deficits noted on exam, decision made to hold tPA and will continue to wait for remainder of lab results. Patient likely to be admitted to stroke service for further w/u. Impression - 71F brought in for L sided weakness/numbness, slurred speech, facial droop. Stroke code activated on arrival to ED and evaluated by stroke team. CT head negative for acute bleed or infarct. Given improvement of neurological symptoms with minimal deficits noted on exam, decision made to hold tPA and will continue to wait for remainder of lab results. Patient likely to be admitted to stroke service for further w/u.     Spoke with Radha Tucker and patient with occlusion of 2 proximal M2 branches of R MCA. Stroke team aware and awaiting recommendations as per neurosurgery. Impression - 71F brought in for L sided weakness/numbness, slurred speech, facial droop. Stroke code activated on arrival to ED and pt evaluated by stroke team. CT head negative for acute bleed or infarct. Given improvement of neurological symptoms with minimal deficits noted on exam, decision made to hold tPA. Will await remainder of labs. Patient to be admitted to stroke service for further w/u.     Spoke with [facility] attending, Dr. Tucker, regarding CTA h/n findings: + occlusion of 2 proximal M2 branches of R MCA. Spoke w/ stroke team and nsg contacted. Given improvement of sx's, will hold on thrombectomy at this time. Pt also undecided about whether to stay for admission; however later agreed to admission. Will start on heparin drip and pt to be admitted to stroke svc for monitoring/ frequent neuro checks.

## 2022-05-29 NOTE — ED PROVIDER NOTE - OBJECTIVE STATEMENT
72 y/o F with no significant PMHx BIBEMS for L sided weakness, numbness, slurred speech, L facial drop, and R gaze preference which was noted by family members at 430pm today, last seen normal at 230PM today. Symptoms appear to be improving on arrival to ED and pt able to speak without difficulty with improved motor function of LUE and LLE. Of note, pt recently travelled from [place] 4d ago. Denies any other complaints. 70 y/o F with no significant PMHx BIBEMS for L sided weakness, numbness, slurred speech, L facial drop, and R gaze preference which was noted by family members at 4:30pm today, last seen normal at 2:30PM today. Symptoms appear to be improving on arrival to ED and pt able to speak without difficulty. Motor function of LUE and LLE improved. Of note, pt recently travelled from [place] 4d ago. Denies any other complaints.

## 2022-05-29 NOTE — CONSULT NOTE ADULT - SUBJECTIVE AND OBJECTIVE BOX
HISTORY OF PRESENT ILLNESS:   HPI: 72 y/o F with no pmh and recent long flight from [place] on Wednesday presents with L hemiparesis, L facial droop and R gaze deviation. LKW 2:30pm 5/29 with son, Maxx. Around 4pm patient was found with left sided weakness. EMS was called and patient was brought to [facility] ED. NIHSS 4 on arrival. CTH with no hemorrhage. CTP with elevated Tmax in the R M2 region. CTA head and neck with proximal occlusion of two posterior right M2. Patient improved to NIHSS 0 so no TPA was given. Neurosurgery consulted for R M2 occlusion.     Patient denies headache, confusion, extremity numbness or weakness and vision changes.     PAST MEDICAL & SURGICAL HISTORY:  unknown  FAMILY HISTORY:  unknown    SOCIAL HISTORY:  unknown    Allergies    No Known Allergies    Intolerances        REVIEW OF SYSTEMS      General:	no recent illnesses, no recent wt gain/loss    Skin/Breast:  intact  	  Ophthalmologic:  negative  	  ENMT:	negative    Respiratory and Thorax: no coughing, wheezing, recent URI  	  Cardiovascular: no chest pain, JEREZ    Gastrointestinal:	soft, non tender    Genitourinary: no frequency, dysuria    Musculoskeletal:	negative    Neurological:	see HPI    Psychiatric:	negative    Hematology/Lymphatics:	negative    Endocrine:  	negative    Allergic/Immunologic:  Negative      MEDICATIONS:  Antibiotics:    Neuro:    Anticoagulation:  heparin  Infusion 1000 Unit(s)/Hr IV Continuous <Continuous>    OTHER:  norepinephrine Infusion 0.05 MICROgram(s)/kG/Min IV Continuous <Continuous>    IVF:      Vital Signs Last 24 Hrs  T(C): 36.1 (29 May 2022 21:00), Max: 36.7 (29 May 2022 20:35)  T(F): 97 (29 May 2022 21:00), Max: 98 (29 May 2022 20:35)  HR: 50 (29 May 2022 22:00) (50 - 66)  BP: 131/58 (29 May 2022 22:00) (131/58 - 170/74)  BP(mean): 84 (29 May 2022 22:00) (84 - 101)  RR: 17 (29 May 2022 22:00) (16 - 31)  SpO2: 98% (29 May 2022 22:00) (97% - 99%)    PHYSICAL EXAM:  General: patient seen laying supine in bed in NAD  Neuro: AAOx3, FC, OE spontaneously, speech clear and fluent, CNII-XI grossly intact, face symmetric, no pronator drift, finger to nose intact, strength 5/5 b/l UE and LE, sensation intact to light touch throughout  HEENT: PERRL, EOMI, VFs intact b/l  Neck: supple  Cardiac: RRR, S1S2  Pulmonary: chest rise symmetric  Abdomen: soft, nontender, nondistended  Ext: perfusing well, DP/PT pulses 2+ b/l  Skin: warm, dry          LABS:                        14.3   6.23  )-----------( 345      ( 29 May 2022 17:36 )             44.1     05-29    142  |  105  |  17  ----------------------------<  85  4.0   |  27  |  1.17    Ca    9.8      29 May 2022 17:36    TPro  7.8  /  Alb  4.5  /  TBili  0.5  /  DBili  x   /  AST  24  /  ALT  17  /  AlkPhos  99  05-29    PT/INR - ( 29 May 2022 17:36 )   PT: 13.1 sec;   INR: 1.10          PTT - ( 29 May 2022 17:36 )  PTT:29.1 sec    CULTURES:      RADIOLOGY & ADDITIONAL STUDIES:  < from: CT Angio Neck w/ IV Cont (05.29.22 @ 17:43) >  IMPRESSION:  No stenosis or occlusion.    < end of copied text >  < from: CT Angio Neck w/ IV Cont (05.29.22 @ 17:43) >    IMPRESSION:  1. Proximal occlusion of two posterior right M2 branches consistent with  thrombosis. Paucity of flow in the posterior right MCA distal to this   level.  2. Otherwise patent intracranial arterial system.    < end of copied text >  < from: CT Perfusion w/ Maps w/ IV Cont (05.29.22 @ 17:43) >  IMPRESSION:  1. Moderate size area of right MCA distribution acute ischemia in the  distribution of the M2 branch occlusions.  2. No CT perfusion evidence of acute infarction.    < end of copied text >    Assessment:   72 y/o F with no pmh and recent long flight from [place] on Wednesday presents with L hemiparesis, L facial droop and R gaze deviation. LKW 2:30pm 5/29. NIHSS improved to 0 in ER and no TPA given.   CTP with elevated Tmax in the R M2 region. CTA head and neck with proximal occlusion of two posterior right M2.        Plan:  - Patient on thrombectomy watch. Please continue neuro stroke and vital checks q1. Please notify neurosurgery with any change in neurological exam.  - Recommend keep -180.  - Continue care per primary team.    D/w Dr. Burgos

## 2022-05-29 NOTE — ED ADULT NURSE REASSESSMENT NOTE - NS ED NURSE REASSESS COMMENT FT1
pt received from [facility] A&Ox4 awake alert appears comfortable respirations even and unlabored NIH currently 1 for left sided slight facial droop. No longer noting left arm drift, not noting extinction/ neglect, no visual gaze palsy noted. MD Ree to bedside speaking with pt and family about plan for admission and why as they were not agreeable to stay as pt's symptoms have improved however now agreeable

## 2022-05-29 NOTE — ED ADULT TRIAGE NOTE - CHIEF COMPLAINT QUOTE
BIBEMS, notification called for LVO stroke code, per EMS pt had L sided facial droop, slurred speech. L sided weakness,  symptoms improving in ED, stroke code activated and pt immediately brought to CT scan.

## 2022-05-29 NOTE — H&P ADULT - ASSESSMENT
71y Female with no past medical history, not on any meds, just got off a long flight from [place] this past Wednesday presents with left sided hemiparesis. LKW 2:30 PM 5/29 when she was with son and was found with left sided weakness around 4 pm. On immediate presentation to the ED, NIHSS 4 which improved to NIHSS 0 after CTH scans. CTH with no hemorrhage. CTP with elevated Tmax in the R M2 region. CTA head and neck with proximal occlusion of two posterior right M2. Patient no longer a tap candidate due to return to baseline. Discussed case with neurovascular. Patient not a thrombectomy candidate at this time.     Discussed plan with patient and son to admit to ICU for close neuro monitoring and possible intervention if she were to neurologically change and IV medication treatment to treat occlusion. Patient initially did not wish to be admitted to the ICU because she is currently doing well, back at baseline. Educated patient that while she definitely improved, she has a blood clot that will cause her to have a stroke and is a high risk for disabling deficits if the vessel were to occlude or clot to embolize. Also emphasized that she will need blood thinner through the IV and aspirin or any other PO medication would not be enough. In addition, she would still need to be admitted for a stroke work up. Patient discusssed with son and other children regarding admission. Also had concerns of cost (as she is visiting and has no insurance) and questions the necessity of ICU. After extensive conversation with son (Maxx) at bedside and patient, patient is willing to be admitted to ICU for close neuro monitoring for thrombectomy watch.     1) Admit to MICU for thrombectomy watch   - q1 neuro checks  - NPO  - hept gtt PTT goal 50-70  - STAT HCT, CTA H/N, CTP if acute change in neuro status   - SBP goal 140-180, start fluids or pressors if need to maintain blood pressure goal  - head of bed flat, bedrest   - SCDs  - Stroke Education    2) Labs: Obtain Hemoglobin A1c, Lipid profile set, and TSH    3) Other tests:  - will eventually need MRI, TTE with bubble. May need LE doppler, hypercoagulable panel if rest of work up unremarkable.   - PT/OT order  - Stroke education    4)DVT ppx - SCDs, hep gtt

## 2022-05-29 NOTE — ED ADULT NURSE NOTE - NSIMPLEMENTINTERV_GEN_ALL_ED
Implemented All Fall Risk Interventions:  [place] to call system. Call bell, personal items and telephone within reach. Instruct patient to call for assistance. Room bathroom lighting operational. Non-slip footwear when patient is off stretcher. Physically safe environment: no spills, clutter or unnecessary equipment. Stretcher in lowest position, wheels locked, appropriate side rails in place. Provide visual cue, wrist band, yellow gown, etc. Monitor gait and stability. Monitor for mental status changes and reorient to person, place, and time. Review medications for side effects contributing to fall risk. Reinforce activity limits and safety measures with patient and family.

## 2022-05-30 LAB
A1C WITH ESTIMATED AVERAGE GLUCOSE RESULT: 5.1 % — SIGNIFICANT CHANGE UP (ref 4–5.6)
ANION GAP SERPL CALC-SCNC: 9 MMOL/L — SIGNIFICANT CHANGE UP (ref 5–17)
APTT BLD: 62.8 SEC — HIGH (ref 27.5–35.5)
APTT BLD: 68.7 SEC — HIGH (ref 27.5–35.5)
APTT BLD: 76.3 SEC — HIGH (ref 27.5–35.5)
BASOPHILS # BLD AUTO: 0.02 K/UL — SIGNIFICANT CHANGE UP (ref 0–0.2)
BASOPHILS NFR BLD AUTO: 0.4 % — SIGNIFICANT CHANGE UP (ref 0–2)
BUN SERPL-MCNC: 12 MG/DL — SIGNIFICANT CHANGE UP (ref 7–23)
CALCIUM SERPL-MCNC: 8.9 MG/DL — SIGNIFICANT CHANGE UP (ref 8.4–10.5)
CHLORIDE SERPL-SCNC: 109 MMOL/L — HIGH (ref 96–108)
CHOLEST SERPL-MCNC: 146 MG/DL — SIGNIFICANT CHANGE UP
CO2 SERPL-SCNC: 23 MMOL/L — SIGNIFICANT CHANGE UP (ref 22–31)
CREAT SERPL-MCNC: 0.9 MG/DL — SIGNIFICANT CHANGE UP (ref 0.5–1.3)
EGFR: 68 ML/MIN/1.73M2 — SIGNIFICANT CHANGE UP
EOSINOPHIL # BLD AUTO: 0.05 K/UL — SIGNIFICANT CHANGE UP (ref 0–0.5)
EOSINOPHIL NFR BLD AUTO: 0.9 % — SIGNIFICANT CHANGE UP (ref 0–6)
ESTIMATED AVERAGE GLUCOSE: 100 MG/DL — SIGNIFICANT CHANGE UP (ref 68–114)
GLUCOSE SERPL-MCNC: 104 MG/DL — HIGH (ref 70–99)
HCT VFR BLD CALC: 42 % — SIGNIFICANT CHANGE UP (ref 34.5–45)
HCV AB S/CO SERPL IA: 0.04 S/CO — SIGNIFICANT CHANGE UP
HCV AB SERPL-IMP: SIGNIFICANT CHANGE UP
HDLC SERPL-MCNC: 70 MG/DL — SIGNIFICANT CHANGE UP
HGB BLD-MCNC: 13.8 G/DL — SIGNIFICANT CHANGE UP (ref 11.5–15.5)
IMM GRANULOCYTES NFR BLD AUTO: 0.2 % — SIGNIFICANT CHANGE UP (ref 0–1.5)
LIPID PNL WITH DIRECT LDL SERPL: 65 MG/DL — SIGNIFICANT CHANGE UP
LYMPHOCYTES # BLD AUTO: 1.37 K/UL — SIGNIFICANT CHANGE UP (ref 1–3.3)
LYMPHOCYTES # BLD AUTO: 24.7 % — SIGNIFICANT CHANGE UP (ref 13–44)
MAGNESIUM SERPL-MCNC: 2.3 MG/DL — SIGNIFICANT CHANGE UP (ref 1.6–2.6)
MCHC RBC-ENTMCNC: 30.5 PG — SIGNIFICANT CHANGE UP (ref 27–34)
MCHC RBC-ENTMCNC: 32.9 GM/DL — SIGNIFICANT CHANGE UP (ref 32–36)
MCV RBC AUTO: 92.7 FL — SIGNIFICANT CHANGE UP (ref 80–100)
MONOCYTES # BLD AUTO: 0.32 K/UL — SIGNIFICANT CHANGE UP (ref 0–0.9)
MONOCYTES NFR BLD AUTO: 5.8 % — SIGNIFICANT CHANGE UP (ref 2–14)
NEUTROPHILS # BLD AUTO: 3.78 K/UL — SIGNIFICANT CHANGE UP (ref 1.8–7.4)
NEUTROPHILS NFR BLD AUTO: 68 % — SIGNIFICANT CHANGE UP (ref 43–77)
NON HDL CHOLESTEROL: 76 MG/DL — SIGNIFICANT CHANGE UP
NRBC # BLD: 0 /100 WBCS — SIGNIFICANT CHANGE UP (ref 0–0)
PHOSPHATE SERPL-MCNC: 3.1 MG/DL — SIGNIFICANT CHANGE UP (ref 2.5–4.5)
PLATELET # BLD AUTO: 288 K/UL — SIGNIFICANT CHANGE UP (ref 150–400)
POTASSIUM SERPL-MCNC: 3.8 MMOL/L — SIGNIFICANT CHANGE UP (ref 3.5–5.3)
POTASSIUM SERPL-SCNC: 3.8 MMOL/L — SIGNIFICANT CHANGE UP (ref 3.5–5.3)
RBC # BLD: 4.53 M/UL — SIGNIFICANT CHANGE UP (ref 3.8–5.2)
RBC # FLD: 13.8 % — SIGNIFICANT CHANGE UP (ref 10.3–14.5)
SODIUM SERPL-SCNC: 141 MMOL/L — SIGNIFICANT CHANGE UP (ref 135–145)
TRIGL SERPL-MCNC: 57 MG/DL — SIGNIFICANT CHANGE UP
TSH SERPL-MCNC: 1.04 UIU/ML — SIGNIFICANT CHANGE UP (ref 0.27–4.2)
WBC # BLD: 5.55 K/UL — SIGNIFICANT CHANGE UP (ref 3.8–10.5)
WBC # FLD AUTO: 5.55 K/UL — SIGNIFICANT CHANGE UP (ref 3.8–10.5)

## 2022-05-30 PROCEDURE — 99291 CRITICAL CARE FIRST HOUR: CPT

## 2022-05-30 PROCEDURE — 93970 EXTREMITY STUDY: CPT | Mod: 26

## 2022-05-30 PROCEDURE — 70450 CT HEAD/BRAIN W/O DYE: CPT | Mod: 26

## 2022-05-30 RX ORDER — HEPARIN SODIUM 5000 [USP'U]/ML
900 INJECTION INTRAVENOUS; SUBCUTANEOUS
Qty: 25000 | Refills: 0 | Status: DISCONTINUED | OUTPATIENT
Start: 2022-05-30 | End: 2022-05-30

## 2022-05-30 RX ORDER — HEPARIN SODIUM 5000 [USP'U]/ML
750 INJECTION INTRAVENOUS; SUBCUTANEOUS
Qty: 25000 | Refills: 0 | Status: DISCONTINUED | OUTPATIENT
Start: 2022-05-30 | End: 2022-06-03

## 2022-05-30 RX ORDER — SODIUM CHLORIDE 9 MG/ML
1000 INJECTION INTRAMUSCULAR; INTRAVENOUS; SUBCUTANEOUS ONCE
Refills: 0 | Status: COMPLETED | OUTPATIENT
Start: 2022-05-30 | End: 2022-05-30

## 2022-05-30 RX ADMIN — SODIUM CHLORIDE 1000 MILLILITER(S): 9 INJECTION INTRAMUSCULAR; INTRAVENOUS; SUBCUTANEOUS at 23:31

## 2022-05-30 NOTE — PROGRESS NOTE ADULT - SUBJECTIVE AND OBJECTIVE BOX
MACRINA VALDEZ, 71y, Female  MRN-[id number]  Patient is a 71y old  Female who presents with a chief complaint of     OVERNIGHT EVENTS: NAEO     SUBJECTIVE: Pt seen/examined at bedside. Per patient, she has noticed no worsening in her neurologic symptoms. She states that she feels as though she is functioning at her baseline level. No acute changes noticed by patient and she denies any weakness, numbness, tingling.     12 Point ROS Negative unless noted otherwise above.  -------------------------------------------------------------------------------  VITAL SIGNS:  Vital Signs Last 24 Hrs  T(C): 36.7 (30 May 2022 09:29), Max: 36.7 (29 May 2022 20:35)  T(F): 98.1 (30 May 2022 09:29), Max: 98.1 (30 May 2022 09:29)  HR: 57 (30 May 2022 09:00) (50 - 67)  BP: 153/67 (30 May 2022 09:00) (125/60 - 187/77)  BP(mean): 97 (30 May 2022 09:00) (84 - 111)  RR: 16 (30 May 2022 09:00) (16 - 31)  SpO2: 97% (30 May 2022 09:00) (97% - 99%)  I&O's Summary    29 May 2022 07:01  -  30 May 2022 07:00  --------------------------------------------------------  IN: 1128.1 mL / OUT: 0 mL / NET: 1128.1 mL    30 May 2022 07:01  -  30 May 2022 10:14  --------------------------------------------------------  IN: 31.5 mL / OUT: 0 mL / NET: 31.5 mL        PHYSICAL EXAM:    General: laying in bed; speaking in full sentences   HEENT: NC/AT  Neck: supple, trachea midline  Cardiovascular: RRR, +S1/S2; NO M/R/G  Respiratory: CTA B/L; no W/R/R  Gastrointestinal: soft, NT/ND; +BSx4  Extremities: WWP; no edema or cyanosis  Vascular: 2+ radial, DP/PT pulses B/L  Neurological: AAOx3; no focal deficits    ALLERGIES:  Allergies    No Known Allergies    Intolerances        MEDICATIONS:  MEDICATIONS  (STANDING):  heparin  Infusion 750 Unit(s)/Hr (7.5 mL/Hr) IV Continuous <Continuous>  norepinephrine Infusion 0.05 MICROgram(s)/kG/Min (7.82 mL/Hr) IV Continuous <Continuous>    MEDICATIONS  (PRN):      -------------------------------------------------------------------------------  LABS:                        13.8   5.55  )-----------( 288      ( 30 May 2022 06:17 )             42.0     05-30    141  |  109<H>  |  12  ----------------------------<  104<H>  3.8   |  23  |  0.90    Ca    8.9      30 May 2022 06:17  Phos  3.1     05-30  Mg     2.3     05-30    TPro  7.8  /  Alb  4.5  /  TBili  0.5  /  DBili  x   /  AST  24  /  ALT  17  /  AlkPhos  99  05-29    LIVER FUNCTIONS - ( 29 May 2022 17:36 )  Alb: 4.5 g/dL / Pro: 7.8 g/dL / ALK PHOS: 99 U/L / ALT: 17 U/L / AST: 24 U/L / GGT: x           PT/INR - ( 29 May 2022 17:36 )   PT: 13.1 sec;   INR: 1.10          PTT - ( 30 May 2022 06:17 )  PTT:76.3 sec    CAPILLARY BLOOD GLUCOSE  78 (29 May 2022 18:13)      POCT Blood Glucose.: 78 mg/dL (29 May 2022 17:31)      COVID-19 PCR: Negative (29 May 2022 19:30)      RADIOLOGY & ADDITIONAL TESTS: Reviewed.

## 2022-05-30 NOTE — PHYSICAL THERAPY INITIAL EVALUATION ADULT - PATIENT/FAMILY/SIGNIFICANT OTHER GOALS STATEMENT, PT EVAL
Pt. would like to be able to return to all her prior daily activities and to be able to perform them independently.

## 2022-05-30 NOTE — PHYSICAL THERAPY INITIAL EVALUATION ADULT - ADDITIONAL COMMENTS
Pt. 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 Pt. is currently visiting in [facility], no MARICEL.

## 2022-05-30 NOTE — PHYSICAL THERAPY INITIAL EVALUATION ADULT - GAIT DEVIATIONS NOTED, PT EVAL
decreased single limb stance and decreased L LE clearance/decreased hardeep/increased time in double stance

## 2022-05-30 NOTE — OCCUPATIONAL THERAPY INITIAL EVALUATION ADULT - PERTINENT HX OF CURRENT PROBLEM, REHAB EVAL
Presents with L hemiparesis, L facial droop and R gaze deviation. S/P Stroke code (+no tPA). CT Angio - +Proximal occlusion of two posterior right M2 branches consistent with thrombosis. Moderate size area of right MCA distribution acute ischemia in the distribution of the M2 branch occlusions.

## 2022-05-30 NOTE — PROGRESS NOTE ADULT - ASSESSMENT
71y Female with no past medical history, not on any meds, just got off a long flight from [place] this past Wednesday presents with left sided hemiparesis. LKW 2:30 PM 5/29 when she was with son and was found with left sided weakness around 4 pm. On immediate presentation to the ED, NIHSS 4 which improved to NIHSS 0 after CTH scans. CTH with no hemorrhage. CTP with elevated Tmax in the R M2 region. CTA head and neck with proximal occlusion of two posterior right M2. Patient no longer a tap candidate due to return to baseline. Discussed case with neurovascular. Patient not a thrombectomy candidate at this time.     Discussed plan with patient and son to admit to ICU for close neuro monitoring and possible intervention if she were to neurologically change and IV medication treatment to treat occlusion. Patient initially did not wish to be admitted to the ICU because she is currently doing well, back at baseline. Educated patient that while she definitely improved, she has a blood clot that will cause her to have a stroke and is a high risk for disabling deficits if the vessel were to occlude or clot to embolize. Also emphasized that she will need blood thinner through the IV and aspirin or any other PO medication would not be enough. In addition, she would still need to be admitted for a stroke work up. Patient discusssed with son and other children regarding admission. Also had concerns of cost (as she is visiting and has no insurance) and questions the necessity of ICU. After extensive conversation with son (Maxx) at bedside and patient, patient is willing to be admitted to ICU for close neuro monitoring for thrombectomy watch.     1) Admitted to MICU for thrombectomy watch   - q1 neuro checks to continue at this time   - NPO  - hept gtt PTT goal 50-70  - STAT HCT, CTA H/N, CTP if acute change in neuro status   - SBP goal 140-180, start fluids or pressors if need to maintain blood pressure goal. Patient started on pressors overnight to maintain SBP >140   - head of bed flat, bedrest   - SCDs  - Stroke Education    3) Other tests:  - will eventually need MRI, TTE with bubble. May need LE doppler, hypercoagulable panel if rest of work up unremarkable.   - PT/OT order  - Stroke education    4)DVT ppx - SCDs, hep gtt

## 2022-05-30 NOTE — OCCUPATIONAL THERAPY INITIAL EVALUATION ADULT - BALANCE DISTURBANCE, IDENTIFIED IMPAIRMENT CONTRIBUTE, REHAB EVAL
impaired coordination/abnormal muscle tone/impaired postural control/decreased ROM/decreased strength

## 2022-05-30 NOTE — PHYSICAL THERAPY INITIAL EVALUATION ADULT - PERTINENT HX OF CURRENT PROBLEM, REHAB EVAL
Pt. is a 71 y.o R hand dominant female presenting with sudden onset L sided hemiplegia, L facial droop, R gaze preference; found to have R M2 occlusion with subsequent spontaneous improvement in symptoms, decision was made against administering tPA as pt was downgraded NISS=0. Pt. currently on low dose Levo to maintain cerebral perfusion with SBP >140. Pt. cleared for PT by stroke ACP.

## 2022-05-30 NOTE — OCCUPATIONAL THERAPY INITIAL EVALUATION ADULT - ADDITIONAL COMMENTS
Pt resides in [facility], currently visiting w/ her son in [facility]. Pt describes herself as fully independent up to the time of incident. Does not use AEs/DMEs.

## 2022-05-30 NOTE — OCCUPATIONAL THERAPY INITIAL EVALUATION ADULT - GENERAL OBSERVATIONS, REHAB EVAL
Pt's ASHLY Meza aware of intent to eval/tx; cleared Pt. Pt received in supine - +telemetry, IVs, heplock, b/l scds. Pt's son at bedside. PT Martha present.

## 2022-05-30 NOTE — OCCUPATIONAL THERAPY INITIAL EVALUATION ADULT - PLANNED THERAPY INTERVENTIONS, OT EVAL
ADL retraining/IADL retraining/balance training/bed mobility training/fine motor coordination training/motor coordination training/neuromuscular re-education/parent/caregiver training.../ROM/strengthening/transfer training

## 2022-05-30 NOTE — OCCUPATIONAL THERAPY INITIAL EVALUATION ADULT - MD ORDER
71y Female with no past medical history, not on any meds, just got off a long flight from [place] this past Wednesday presents with left sided hemiparesis. LKW 2:30 PM 5/29 when she was with son and was found with left sided weakness around 4 pm. On immediate presentation to the ED, NIHSS 4 which improved to NIHSS 0 after CTH scans. CTH with no hemorrhage. CTP with elevated Tmax in the R M2 region. CTA head and neck with proximal occlusion of two posterior right M2.

## 2022-05-30 NOTE — PHYSICAL THERAPY INITIAL EVALUATION ADULT - MODALITIES TREATMENT COMMENTS
Facial strength, symmetry, sensation intact, vision intact to confrontation, gaze convergence intact; smooth pursuits - pt able to track in all directions, but movement is fragmented - ? i/s/o of decreased conceptual following of command; shoulder shrug 5/5, tongue protrudes at midline; hearing intact bilat;  speech fluent w/o word finding difficulties.

## 2022-05-31 LAB
ALBUMIN SERPL ELPH-MCNC: 4.1 G/DL — SIGNIFICANT CHANGE UP (ref 3.3–5)
ALP SERPL-CCNC: 95 U/L — SIGNIFICANT CHANGE UP (ref 40–120)
ALT FLD-CCNC: 14 U/L — SIGNIFICANT CHANGE UP (ref 10–45)
ANION GAP SERPL CALC-SCNC: 12 MMOL/L — SIGNIFICANT CHANGE UP (ref 5–17)
APTT BLD: 60.5 SEC — HIGH (ref 27.5–35.5)
AST SERPL-CCNC: 18 U/L — SIGNIFICANT CHANGE UP (ref 10–40)
BASOPHILS # BLD AUTO: 0.02 K/UL — SIGNIFICANT CHANGE UP (ref 0–0.2)
BASOPHILS NFR BLD AUTO: 0.4 % — SIGNIFICANT CHANGE UP (ref 0–2)
BILIRUB SERPL-MCNC: 1.1 MG/DL — SIGNIFICANT CHANGE UP (ref 0.2–1.2)
BLD GP AB SCN SERPL QL: NEGATIVE — SIGNIFICANT CHANGE UP
BUN SERPL-MCNC: 11 MG/DL — SIGNIFICANT CHANGE UP (ref 7–23)
CALCIUM SERPL-MCNC: 9.3 MG/DL — SIGNIFICANT CHANGE UP (ref 8.4–10.5)
CHLORIDE SERPL-SCNC: 107 MMOL/L — SIGNIFICANT CHANGE UP (ref 96–108)
CO2 SERPL-SCNC: 23 MMOL/L — SIGNIFICANT CHANGE UP (ref 22–31)
CREAT SERPL-MCNC: 0.8 MG/DL — SIGNIFICANT CHANGE UP (ref 0.5–1.3)
EGFR: 79 ML/MIN/1.73M2 — SIGNIFICANT CHANGE UP
EOSINOPHIL # BLD AUTO: 0.08 K/UL — SIGNIFICANT CHANGE UP (ref 0–0.5)
EOSINOPHIL NFR BLD AUTO: 1.5 % — SIGNIFICANT CHANGE UP (ref 0–6)
GLUCOSE SERPL-MCNC: 100 MG/DL — HIGH (ref 70–99)
HCT VFR BLD CALC: 43.8 % — SIGNIFICANT CHANGE UP (ref 34.5–45)
HGB BLD-MCNC: 14.3 G/DL — SIGNIFICANT CHANGE UP (ref 11.5–15.5)
IMM GRANULOCYTES NFR BLD AUTO: 0.2 % — SIGNIFICANT CHANGE UP (ref 0–1.5)
LYMPHOCYTES # BLD AUTO: 1.92 K/UL — SIGNIFICANT CHANGE UP (ref 1–3.3)
LYMPHOCYTES # BLD AUTO: 35.9 % — SIGNIFICANT CHANGE UP (ref 13–44)
MAGNESIUM SERPL-MCNC: 2.1 MG/DL — SIGNIFICANT CHANGE UP (ref 1.6–2.6)
MCHC RBC-ENTMCNC: 30.2 PG — SIGNIFICANT CHANGE UP (ref 27–34)
MCHC RBC-ENTMCNC: 32.6 GM/DL — SIGNIFICANT CHANGE UP (ref 32–36)
MCV RBC AUTO: 92.4 FL — SIGNIFICANT CHANGE UP (ref 80–100)
MONOCYTES # BLD AUTO: 0.36 K/UL — SIGNIFICANT CHANGE UP (ref 0–0.9)
MONOCYTES NFR BLD AUTO: 6.7 % — SIGNIFICANT CHANGE UP (ref 2–14)
NEUTROPHILS # BLD AUTO: 2.96 K/UL — SIGNIFICANT CHANGE UP (ref 1.8–7.4)
NEUTROPHILS NFR BLD AUTO: 55.3 % — SIGNIFICANT CHANGE UP (ref 43–77)
NRBC # BLD: 0 /100 WBCS — SIGNIFICANT CHANGE UP (ref 0–0)
PHOSPHATE SERPL-MCNC: 3.8 MG/DL — SIGNIFICANT CHANGE UP (ref 2.5–4.5)
PLATELET # BLD AUTO: 348 K/UL — SIGNIFICANT CHANGE UP (ref 150–400)
POTASSIUM SERPL-MCNC: 3.8 MMOL/L — SIGNIFICANT CHANGE UP (ref 3.5–5.3)
POTASSIUM SERPL-SCNC: 3.8 MMOL/L — SIGNIFICANT CHANGE UP (ref 3.5–5.3)
PROT SERPL-MCNC: 7.4 G/DL — SIGNIFICANT CHANGE UP (ref 6–8.3)
RBC # BLD: 4.74 M/UL — SIGNIFICANT CHANGE UP (ref 3.8–5.2)
RBC # FLD: 13.5 % — SIGNIFICANT CHANGE UP (ref 10.3–14.5)
RH IG SCN BLD-IMP: POSITIVE — SIGNIFICANT CHANGE UP
SODIUM SERPL-SCNC: 142 MMOL/L — SIGNIFICANT CHANGE UP (ref 135–145)
WBC # BLD: 5.35 K/UL — SIGNIFICANT CHANGE UP (ref 3.8–10.5)
WBC # FLD AUTO: 5.35 K/UL — SIGNIFICANT CHANGE UP (ref 3.8–10.5)

## 2022-05-31 PROCEDURE — 99232 SBSQ HOSP IP/OBS MODERATE 35: CPT

## 2022-05-31 PROCEDURE — 93306 TTE W/DOPPLER COMPLETE: CPT | Mod: 26

## 2022-05-31 PROCEDURE — 99233 SBSQ HOSP IP/OBS HIGH 50: CPT

## 2022-05-31 RX ORDER — POLYETHYLENE GLYCOL 3350 17 G/17G
17 POWDER, FOR SOLUTION ORAL DAILY
Refills: 0 | Status: DISCONTINUED | OUTPATIENT
Start: 2022-05-31 | End: 2022-06-09

## 2022-05-31 RX ORDER — ACETAZOLAMIDE 250 MG/1
1000 TABLET ORAL ONCE
Refills: 0 | Status: COMPLETED | OUTPATIENT
Start: 2022-05-31 | End: 2022-05-31

## 2022-05-31 RX ORDER — MIDODRINE HYDROCHLORIDE 2.5 MG/1
5 TABLET ORAL EVERY 8 HOURS
Refills: 0 | Status: DISCONTINUED | OUTPATIENT
Start: 2022-05-31 | End: 2022-06-01

## 2022-05-31 RX ADMIN — MIDODRINE HYDROCHLORIDE 5 MILLIGRAM(S): 2.5 TABLET ORAL at 12:22

## 2022-05-31 RX ADMIN — MIDODRINE HYDROCHLORIDE 5 MILLIGRAM(S): 2.5 TABLET ORAL at 19:26

## 2022-05-31 RX ADMIN — HEPARIN SODIUM 7.5 UNIT(S)/HR: 5000 INJECTION INTRAVENOUS; SUBCUTANEOUS at 21:57

## 2022-05-31 RX ADMIN — Medication 7.82 MICROGRAM(S)/KG/MIN: at 21:59

## 2022-05-31 RX ADMIN — Medication 7.82 MICROGRAM(S)/KG/MIN: at 21:56

## 2022-05-31 RX ADMIN — HEPARIN SODIUM 7.5 UNIT(S)/HR: 5000 INJECTION INTRAVENOUS; SUBCUTANEOUS at 21:59

## 2022-05-31 RX ADMIN — POLYETHYLENE GLYCOL 3350 17 GRAM(S): 17 POWDER, FOR SOLUTION ORAL at 12:22

## 2022-05-31 NOTE — DIETITIAN INITIAL EVALUATION ADULT - ADD RECOMMEND
1. Continue with current diet order (monitor need for ONS) 2. Encourage pt to meet nutritional needs as able 3. Encourage adherence to diet education (reinforce as able) 4. Pain and bowel regimen per team 5. Will continue to assess/honor preferences as able 6. Monitor weight changes/trends

## 2022-05-31 NOTE — DIETITIAN INITIAL EVALUATION ADULT - CALCULATED TO (G/KG)
Checked patient's blood sugar and was noted to be 29, rechecked immediately on
other hand and blood sugar was 32. Patient was alert and denies shakiness,
just states that he is hungry. 1 amp of D50 administered. 83.76

## 2022-05-31 NOTE — CONSULT NOTE ADULT - SUBJECTIVE AND OBJECTIVE BOX
**STROKE CONSULT NOTE**    HPI: cLKW 2:30pm 5/29 with son, Maxx. Around 4pm patient was found with left sided weakness. EMS was called and patient was brought to [facility] ED. Patient states she fell on her left side a couple hours prior to coming into the hospital. NIHSS 4 on arrival. Initial CTH without acute abnormality. CTP with elevated Tmax in the R M2 region. CTA head and neck with proximal occlusion of two posterior right M2. Patient improved to NIHSS 0 so no TPA was given. Repeat CTH on 5/29 with small hypodensity in right insula suspicious for acute infarct.     Patient seen and examined at bedside today with family present. Patient denies any acute neurological complaints. Feels at baseline. Denies focal numbness or weakness.     T(C): 36.8 (05-31-22 @ 09:04), Max: 37 (05-30-22 @ 17:36)  HR: 63 (05-31-22 @ 09:00) (55 - 67)  BP: 167/75 (05-31-22 @ 09:00) (109/49 - 182/82)  RR: 20 (05-31-22 @ 09:00) (13 - 29)  SpO2: 99% (05-31-22 @ 09:00) (95% - 100%)    PAST MEDICAL & SURGICAL HISTORY:  No pertinent past medical history      No significant past surgical history          FAMILY HISTORY:    ROS:   see HPI    MEDICATIONS  (STANDING):  heparin  Infusion 750 Unit(s)/Hr (7.5 mL/Hr) IV Continuous <Continuous>  norepinephrine Infusion 0.05 MICROgram(s)/kG/Min (7.82 mL/Hr) IV Continuous <Continuous>  polyethylene glycol 3350 17 Gram(s) Oral daily    MEDICATIONS  (PRN):    Allergies    No Known Allergies    Intolerances      Vital Signs Last 24 Hrs  T(C): 36.8 (31 May 2022 09:04), Max: 37 (30 May 2022 17:36)  T(F): 98.3 (31 May 2022 09:04), Max: 98.6 (30 May 2022 17:36)  HR: 63 (31 May 2022 09:00) (55 - 67)  BP: 167/75 (31 May 2022 09:00) (109/49 - 182/82)  BP(mean): 108 (31 May 2022 09:00) (71 - 118)  RR: 20 (31 May 2022 09:00) (13 - 29)  SpO2: 99% (31 May 2022 09:00) (95% - 100%)    Physical exam:  Constitutional: No acute distress, conversant    Neurologic:  -Mental status: Awake, alert, oriented to person, place, and time. Speech is fluent with intact naming, repetition, and comprehension, no dysarthria. Recent and remote memory intact. Follows commands. Attention/concentration intact.   -Cranial nerves:   II: Visual fields are full to confrontation.  III, IV, VI: Extraocular movements are intact without nystagmus. Pupils equally round and reactive to light  V:  Facial sensation V1-V3 equal and intact   VII: Face is symmetric with normal eye closure and smile  VIII: Hearing is bilaterally grossly intact to voice  XII: Tongue protrudes midline  Motor: Normal bulk and tone. No pronator drift. Strength bilateral upper extremity 5/5, bilateral lower extremities 5/5.  Sensation: Intact to light touch bilaterally. No neglect or extinction on double simultaneous testing.  Coordination: No dysmetria on finger-to-nose bilaterally    Fingerstick Blood Glucose: CAPILLARY BLOOD GLUCOSE  78 (29 May 2022 18:13)      POCT Blood Glucose.: 78 mg/dL (29 May 2022 17:31)    LABS:                        14.3   5.35  )-----------( 348      ( 31 May 2022 05:07 )             43.8     05-31    142  |  107  |  11  ----------------------------<  100<H>  3.8   |  23  |  0.80    Ca    9.3      31 May 2022 05:07  Phos  3.8     05-31  Mg     2.1     05-31    TPro  7.4  /  Alb  4.1  /  TBili  1.1  /  DBili  x   /  AST  18  /  ALT  14  /  AlkPhos  95  05-31    PT/INR - ( 29 May 2022 17:36 )   PT: 13.1 sec;   INR: 1.10          PTT - ( 31 May 2022 05:07 )  PTT:60.5 sec  CARDIAC MARKERS ( 29 May 2022 17:36 )  x     / 0.01 ng/mL / x     / x     / x          RADIOLOGY & ADDITIONAL STUDIES:  CTH 5/29: no acute abnormality  CTP: IMPRESSION:  1. Moderate size area of right MCA distribution acute ischemia in the  distribution of the M2 branch occlusions.  2. No CT perfusion evidence of acute infarction.    COMMENTS:  Per perfusion analysis software, Tmax &gt; 6 sec is ischemic tissue   volume. CBF &lt;  30% is infarct core volume. Tmax &gt; 10 sec is poor collateral flow or   severe  delayed perfusion volume.    CTA: IMPRESSION:  1. Proximal occlusion of two posterior right M2 branches consistent with  thrombosis. Paucity of flow in the posterior right MCA distal to this   level.  2. Otherwise patent intracranial arterial system.    CTH: 5/30: Interval development of a small focal hypodensity in the right insula   suspicious for acute infarction. No mass effect or hemorrhagic   transformation.    **STROKE CONSULT NOTE**    HPI: cLKW 2:30pm 5/29 with son, Maxx. Around 4pm patient was found with left sided weakness. EMS was called and patient was brought to [facility] ED. Patient states she fell on her left side a couple hours prior to coming into the hospital. NIHSS 4 on arrival. Initial CTH without acute abnormality. CTP with elevated Tmax in the R M2 region. CTA head and neck with proximal occlusion of two posterior right M2. Patient improved to NIHSS 0 so no TPA was given. Repeat CTH on 5/29 with small hypodensity in right insula suspicious for acute infarct.     Patient seen and examined at bedside today with family present. Patient denies any acute neurological complaints. Feels at baseline. Denies focal numbness or weakness. Denies hx of miscarriages, denies hx of clots.    T(C): 36.8 (05-31-22 @ 09:04), Max: 37 (05-30-22 @ 17:36)  HR: 63 (05-31-22 @ 09:00) (55 - 67)  BP: 167/75 (05-31-22 @ 09:00) (109/49 - 182/82)  RR: 20 (05-31-22 @ 09:00) (13 - 29)  SpO2: 99% (05-31-22 @ 09:00) (95% - 100%)    PAST MEDICAL & SURGICAL HISTORY:  No pertinent past medical history      No significant past surgical history          FAMILY HISTORY:    ROS:   see HPI    MEDICATIONS  (STANDING):  heparin  Infusion 750 Unit(s)/Hr (7.5 mL/Hr) IV Continuous <Continuous>  norepinephrine Infusion 0.05 MICROgram(s)/kG/Min (7.82 mL/Hr) IV Continuous <Continuous>  polyethylene glycol 3350 17 Gram(s) Oral daily    MEDICATIONS  (PRN):    Allergies    No Known Allergies    Intolerances      Vital Signs Last 24 Hrs  T(C): 36.8 (31 May 2022 09:04), Max: 37 (30 May 2022 17:36)  T(F): 98.3 (31 May 2022 09:04), Max: 98.6 (30 May 2022 17:36)  HR: 63 (31 May 2022 09:00) (55 - 67)  BP: 167/75 (31 May 2022 09:00) (109/49 - 182/82)  BP(mean): 108 (31 May 2022 09:00) (71 - 118)  RR: 20 (31 May 2022 09:00) (13 - 29)  SpO2: 99% (31 May 2022 09:00) (95% - 100%)    Physical exam:  Constitutional: No acute distress, conversant    Neurologic:  -Mental status: Awake, alert, oriented to person, place, and time. Speech is fluent with intact naming, repetition, and comprehension, no dysarthria. Recent and remote memory intact. Follows commands. Attention/concentration intact.   -Cranial nerves:   II: Visual fields are full to confrontation.  III, IV, VI: Extraocular movements are intact without nystagmus. Pupils equally round and reactive to light  V:  Facial sensation V1-V3 equal and intact   VII: Face is symmetric with normal eye closure and smile  VIII: Hearing is bilaterally grossly intact to voice  XII: Tongue protrudes midline  Motor: Normal bulk and tone. No pronator drift b/L. LUE - mild weakness of bicep strength testing. Strength RUE 5/5, bilateral lower extremities 5/5.  Sensation: Intact to light touch bilaterally. No neglect or extinction on double simultaneous testing.  Coordination: No dysmetria on finger-to-nose bilaterally    Fingerstick Blood Glucose: CAPILLARY BLOOD GLUCOSE  78 (29 May 2022 18:13)      POCT Blood Glucose.: 78 mg/dL (29 May 2022 17:31)    LABS:                        14.3   5.35  )-----------( 348      ( 31 May 2022 05:07 )             43.8     05-31    142  |  107  |  11  ----------------------------<  100<H>  3.8   |  23  |  0.80    Ca    9.3      31 May 2022 05:07  Phos  3.8     05-31  Mg     2.1     05-31    TPro  7.4  /  Alb  4.1  /  TBili  1.1  /  DBili  x   /  AST  18  /  ALT  14  /  AlkPhos  95  05-31    PT/INR - ( 29 May 2022 17:36 )   PT: 13.1 sec;   INR: 1.10          PTT - ( 31 May 2022 05:07 )  PTT:60.5 sec  CARDIAC MARKERS ( 29 May 2022 17:36 )  x     / 0.01 ng/mL / x     / x     / x          RADIOLOGY & ADDITIONAL STUDIES:  CTH 5/29: no acute abnormality  CTP: IMPRESSION:  1. Moderate size area of right MCA distribution acute ischemia in the  distribution of the M2 branch occlusions.  2. No CT perfusion evidence of acute infarction.    COMMENTS:  Per perfusion analysis software, Tmax &gt; 6 sec is ischemic tissue   volume. CBF &lt;  30% is infarct core volume. Tmax &gt; 10 sec is poor collateral flow or   severe  delayed perfusion volume.    CTA: IMPRESSION:  1. Proximal occlusion of two posterior right M2 branches consistent with  thrombosis. Paucity of flow in the posterior right MCA distal to this   level.  2. Otherwise patent intracranial arterial system.    CTH: 5/30: Interval development of a small focal hypodensity in the right insula   suspicious for acute infarction. No mass effect or hemorrhagic   transformation.

## 2022-05-31 NOTE — DIETITIAN INITIAL EVALUATION ADULT - PERTINENT LABORATORY DATA
05-31    142  |  107  |  11  ----------------------------<  100<H>  3.8   |  23  |  0.80    Ca    9.3      31 May 2022 05:07  Phos  3.8     05-31  Mg     2.1     05-31    TPro  7.4  /  Alb  4.1  /  TBili  1.1  /  DBili  x   /  AST  18  /  ALT  14  /  AlkPhos  95  05-31  A1C with Estimated Average Glucose Result: 5.1 % (05-30-22 @ 06:17)

## 2022-05-31 NOTE — CONSULT NOTE ADULT - SUBJECTIVE AND OBJECTIVE BOX
Patient is a 71y old  Female who presents with a chief complaint of STROKE     (31 May 2022 10:16)        HPI:  HPI: 71y Female with no past medical history, not on any meds, just got off a long flight from [place] this past Wednesday presents with left sided hemiparesis. LKW 2:30 PM today when she was with son (Axel [phone], at bedside). Then around 4pm, family found patient with profound left sided weakness. EMS was called. BP with /100. Per EMS, no movement in the left arm nor leg, left facial droop, and right gaze preference and could not overcome.     On presentation to ED, 163/83 with improving exam. AOx3, answering questions and following commands. Right gaze preference and can easily overcome when attention brought to the left side. LUE with drift, does not hit bed. Minor left facial droop (although son says patient's face at baseline), extinguishes to DST on the left. NIHSS 4. CTH with no hemorrhage. CTP with elevated Tmax in the R M2 region. CTA head and neck with proximal occlusion of two posterior right M2. Patient is a tpa candidate as CTH is negative for hemorrhage, within window and although improving exam, with disabling deficits. While mixing tpa, patient exam improved to an NIHSS 0. Drift no longer present, gaze midline and attending bilaterally without preference, sensation intact b/l without extinguishing on DST. Decision made to not give tpa due to return to baseline. Endovascular neurosurgery consulted regarding RM2 occlusion. Patient currently not a thrombectomy candidate due to NIHSS 0 with no focal deficits.     Patient denies CP, SOB, prior episodes of weakness and numbness. She has b/l LE flushing and "heat" occasionally, but never weakness, numbness nor paresthesia.     T(C): --  HR: 59 (05-29-22 @ 17:30) (59 - 59)  BP: 163/83 (05-29-22 @ 17:30) (163/83 - 163/83)  RR: 18 (05-29-22 @ 17:30) (18 - 18)  SpO2: 98% (05-29-22 @ 17:30) (98% - 98%)    PAST MEDICAL & SURGICAL HISTORY:      FAMILY HISTORY:    MEDICATIONS  (STANDING):  heparin  Infusion 750 Unit(s)/Hr (7.5 mL/Hr) IV Continuous <Continuous>  midodrine 5 milliGRAM(s) Oral every 8 hours  norepinephrine Infusion 0.05 MICROgram(s)/kG/Min (7.82 mL/Hr) IV Continuous <Continuous>  polyethylene glycol 3350 17 Gram(s) Oral daily    MEDICATIONS  (PRN):        Social History:                  -  Home Living Status:  lives in [facility], visiting son      Baseline Functional Level Prior to Admission:             - ADL's/ IADL's:  fully independent         - ambulatory status PTA:  walks without devices    FAMILY HISTORY:    CBC Full  -  ( 31 May 2022 05:07 )  WBC Count : 5.35 K/uL  RBC Count : 4.74 M/uL  Hemoglobin : 14.3 g/dL  Hematocrit : 43.8 %  Platelet Count - Automated : 348 K/uL  Mean Cell Volume : 92.4 fl  Mean Cell Hemoglobin : 30.2 pg  Mean Cell Hemoglobin Concentration : 32.6 gm/dL  Auto Neutrophil # : 2.96 K/uL  Auto Lymphocyte # : 1.92 K/uL  Auto Monocyte # : 0.36 K/uL  Auto Eosinophil # : 0.08 K/uL  Auto Basophil # : 0.02 K/uL  Auto Neutrophil % : 55.3 %  Auto Lymphocyte % : 35.9 %  Auto Monocyte % : 6.7 %  Auto Eosinophil % : 1.5 %  Auto Basophil % : 0.4 %      05-31    142  |  107  |  11  ----------------------------<  100<H>  3.8   |  23  |  0.80    Ca    9.3      31 May 2022 05:07  Phos  3.8     05-31  Mg     2.1     05-31    TPro  7.4  /  Alb  4.1  /  TBili  1.1  /  DBili  x   /  AST  18  /  ALT  14  /  AlkPhos  95  05-31            Radiology:    < from: CT Head No Cont (05.30.22 @ 15:07) >    ACC: [id number] EXAM:  CT BRAIN                          PROCEDURE DATE:  05/30/2022          INTERPRETATION:  Clinical history/reason for exam: Right MCA occlusion,   on heparin drip, follow-up    Technique: Multiple contiguous axial CT images of the head were obtained   from the base of the skull to the vertex without the administration of   intravenous contrast. Coronal and sagittal reformatted images were   obtained.    Comparison:CT head 5/29/2022    Findings:    The ventricles and sulci arewithin normal limits for patient's stated   age.    No acute intracranial hemorrhage, extra-axial fluid collection, mass   effect or midline shift..    There is interval development of small focal hypodensity within the right   insula (series 2 image10) suspicious for acute infarction. There is no   significant mass effect. There is no evidence of hemorrhagic   transformation..    The bones of the calvarium are intact.    The paranasal sinuses and bilateral mastoid complexes are well aerated.    Impression:    Interval development of a small focal hypodensity in the right insula   suspicious for acute infarction. No mass effect or hemorrhagic   transformation.      < from: CT Angio Head w/ IV Cont (05.29.22 @ 17:43) >    ACC: [id number] EXAM:  CT ANGIO BRAIN (W)AW IC                        ACC: [id number] EXAM:  CT ANGIO NECK (W)AW IC                          PROCEDURE DATE:  05/29/2022          INTERPRETATION:  Brooks RICH MD, have reviewed the images and   the report and agree with the findings, with the following modification:    IV contrast: 80 mL Isovue-370    Agree that there is occlusion of the proximal right M2 segments the   anterior and superior divisions.    ******PRELIMINARY REPORT******      [facility] RADIOLOGIST PRELIMINARY ADDENDUM REPORT      Initial report created on 5/29/2022 6:25:04 PM EDT  PROCEDURE INFORMATION:  Exam: CT Angiography Head With Contrast, Arteriography  Exam date and time: 5/29/2022 5:37 PM  Age: 71 years old  Clinical indication: Other: Right facial droop    TECHNIQUE:  Imaging protocol: Computed tomography angiography of the head with   contrast.  Exam focused on the arteries.  3D rendering (Not supervised by radiologist): MIP and/or 3D reconstructed  images were created by the technologist.  Total images: 0    COMPARISON:  CT HEAD STROKE PROTOCOL 5/29/2022 5:32 PM    FINDINGS:    ANTERIOR CIRCULATION:  Right internal carotid artery: Unremarkable. Intracranial segment is   patent  with no significant stenosis. Noaneurysm.  Right middle cerebral artery: M1 segment right MCA is patent. There is  occlusion of 2 posterior right M2 branches best appreciated images 300   through  307 of series 10. Paucity of flow in the posterior right MCA division   relative  to the left.  Right anterior cerebral artery: Unremarkable. No occlusion or significant  stenosis. No aneurysm.    Left internal carotid artery: Unremarkable. Intracranial segment is   patent with  no significant stenosis. No aneurysm.  Left middle cerebral artery: Unremarkable. No occlusion or significant  stenosis. No aneurysm.  Left anterior cerebral artery: Unremarkable. No occlusion or significant  stenosis. No aneurysm.    POSTERIOR CIRCULATION:  Right vertebral artery: Intracranial right vertebral artery essentially  terminates in the right PICA.  Left vertebral artery: Unremarkable. No occlusion or significant   stenosis. No  aneurysm.  Basilar artery: Unremarkable. No occlusion or significant stenosis. No  aneurysm.  Right posterior cerebral artery: Unremarkable. No occlusion or significant  stenosis. No aneurysm.  Left posterior cerebral artery: Unremarkable. No occlusion or significant  stenosis. No aneurysm.    Brain: No definite mass, mass effect, or midline shift.  Cerebral ventricles: No ventriculomegaly.  Bones/joints: Unremarkable. No acute fracture.  Soft tissues: Unremarkable.    IMPRESSION:  1. Proximal occlusion of two posterior right M2 branches consistent with  thrombosis. Paucity of flow in the posterior right MCA distal to this   level.  2. Otherwise patent intracranial arterial system.      =========================  PROCEDURE INFORMATION:  Exam: CT Angiography Neck With Contrast  Exam date and time: 5/29/2022 5:37 PM  Age: 71 years old  Clinical indication: Other: Right facial droop    TECHNIQUE:  Imaging protocol: Computed tomography angiography of the neck with   contrast.  3D rendering (Not supervised by radiologist): MIP and/or 3D reconstructed  images were created by the technologist.    COMPARISON:  CT HEAD STROKE PROTOCOL 5/29/2022 5:32 PM    FINDINGS:  Right common carotid artery: No stenosis. No dissection or occlusion.  Right internal carotid artery: No stenosis of the extracranial segment. No  dissection or occlusion.  Right external carotid artery: No occlusion or stenosis of the origin.    Left common carotid artery: No stenosis. No dissection or occlusion.  Left internal carotid artery: No stenosis of the extracranial segment. No  dissection or occlusion.  Left external carotid artery: No occlusion or stenosis of the origin.    Right vertebral artery: No stenosis. No dissection or occlusion.  Left vertebral artery: No stenosis. No dissection or occlusion.    Soft tissues: Normal. No significant soft tissue swelling.    Bones/joints: No acute fracture.    IMPRESSION:  No stenosis or occlusion.    REFERENCES:  NASCET CRITERIA. The degree of internal carotid artery stenosis is based   on  NASCET criteria. Normal is no stenosis. Mild is less than 50% stenosis.  Moderate is 50-69% stenosis. Severe is 70% to 99% stenosis. Total   occlusion is  no detectable patent lumen.    Addendum created by Dr. Javon Tucker MD on 5/29/2022 6:27:11 PM EDT            Vital Signs Last 24 Hrs  T(C): 36.9 (31 May 2022 13:15), Max: 37 (30 May 2022 17:36)  T(F): 98.5 (31 May 2022 13:15), Max: 98.6 (30 May 2022 17:36)  HR: 73 (31 May 2022 13:00) (55 - 73)  BP: 153/99 (31 May 2022 13:00) (109/49 - 182/82)  BP(mean): 121 (31 May 2022 13:00) (71 - 121)  RR: 22 (31 May 2022 13:00) (13 - 29)  SpO2: 99% (31 May 2022 13:00) (95% - 100%)        REVIEW OF SYSTEMS:      CONSTITUTIONAL: No fever, weight loss, or fatigue  EYES: No eye pain, visual disturbances, or discharge  ENMT:  No difficulty hearing, tinnitus, vertigo; No sinus or throat pain  NECK: No pain or stiffness  BREASTS: No pain, masses, or nipple discharge  RESPIRATORY: No cough, wheezing, chills or hemoptysis; No shortness of breath  CARDIOVASCULAR: No chest pain, palpitations, dizziness, or leg swelling  GASTROINTESTINAL: No abdominal or epigastric pain. No nausea, vomiting, or hematemesis; No diarrhea or constipation. No melena or hematochezia.  GENITOURINARY: No dysuria, frequency, hematuria, or incontinence  NEUROLOGICAL:  o headaches, memory loss, loss of strength, numbness, or tremors  SKIN: No itching, burning, rashes, or lesions   LYMPH NODES: No enlarged glands  ENDOCRINE: No heat or cold intolerance; No hair loss  MUSCULOSKELETAL: No joint pain or swelling; No muscle, back, or extremity pain  PSYCHIATRIC: No depression, anxiety, mood swings, or difficulty sleeping  HEME/LYMPH: No easy bruising, or bleeding gums  ALLERGY AND IMMUNOLOGIC: No hives or eczema  VASCULAR: no swelling, erythema          Physical Exam:   72 yo AA woman lying in semi Kolb's position, awake, alert,  feels better    Head: normocephalic, atraumatic    Eyes: PERRLA, EOMI, no nystagmus, sclera anicteric    ENT: nasal discharge, uvula midline, no oropharyngeal erythema/exudate    Neck: supple, negative JVD, negative carotid bruits, no thyromegaly    Chest: CTA bilaterally, neg wheeze/ rhonchi/ rales/ crackles/ egophany    Cardiovascular: regular rate and rhythm, neg murmurs/rubs/gallops    Abdomen: soft, non distended, non tender to palpation in all 4 quadrants, negative rebound/guarding, normal bowel sounds    Extremities: WWP, neg cyanosis/clubbing/edema, negative calf tenderness to palpation, negative Ashvin's sign    Musculoskeletal:    Skin:      :     Neurologic Exam:    Alert and oriented to person, age , place , date/year, speech fluent w/o dysarthria , follows commands , recent and remote memory intact , repetition intact , comprehension intact ,  attention/concentration intact , fund of knowledge appropriate    Cranial Nerves:     II:                         pupils equal, round and reactive to light, visual fields intact   III/ IV/VI:              extraocular movements intact, neg nystagmus, neg ptosis  V:                        facial sensation intact, V1-3 normal  VII:                      face symmetric, no droop, normal eye closure and smile  VIII:                     hearing intact to finger rub bilaterally  IX and X:             no hoarseness, gag intact, palate/ uvula rise symmetrically  XI:                       SCM/ trapezius strength intact bilateral  XII:                      no tongue deviation    Motor Exam:    Right UE:              : 5/5  wrist extensors/ flexors: 5/5  biceps:   4-/5                    triceps:  5/5  deltoid:  5/5    negative pronator drift                               Left UE:            : 5/5  wrist extensors/ flexors: 5/5  biceps:   5/5                    triceps:  5/5  deltoid:  5/5    negative pronator drift        Right LE:               dorsiflexors:  5/5  plantar flexors:  5/5  quadriceps:  5/5  hamstrings:  5/5  hip flexors:  5/5    Left LE:                dorsiflexors:  5/5  plantar flexors:  5/5  quadriceps:  5/5  hamstrings:  5/5  hip flexors:  5/5        Sensation:         intact to light touch x 4 extremities                         no neglect or extinction on double simultaneous testing                       DTR:                     biceps/brachioradialis: equal                                              patella/ankle: equal                              neg Babinski                        Coordination:      Finger to Nose:  neg dysmetria bilaterally    Gait:  not tested              PM&R Impression:    1) s/p R   2) mild LUE / bicpes weakness    Recommendations/ Plan :    1) Physical / Occupational therapy focusing on therapeutic exercises, bed mobility/transfer out of bed evaluation, progressive ambulation with assistive devices prn.    2) Anticipated Disposition Plan/Recs  :     pending functional progress

## 2022-05-31 NOTE — DIETITIAN INITIAL EVALUATION ADULT - PERTINENT MEDS FT
MEDICATIONS  (STANDING):  heparin  Infusion 750 Unit(s)/Hr (7.5 mL/Hr) IV Continuous <Continuous>  norepinephrine Infusion 0.05 MICROgram(s)/kG/Min (7.82 mL/Hr) IV Continuous <Continuous>  polyethylene glycol 3350 17 Gram(s) Oral daily    MEDICATIONS  (PRN):

## 2022-05-31 NOTE — DIETITIAN INITIAL EVALUATION ADULT - PATIENT MEETS CRITERIA FOR MALNUTRITION
Head: Normocephalic and atraumatic. Right Ear: Hearing, tympanic membrane, external ear and ear canal normal.   Left Ear: Hearing, tympanic membrane, external ear and ear canal normal.   Nose: Nose normal.   Mouth/Throat: Mucous membranes are normal. Posterior oropharyngeal edema (with cobblestoning) and posterior oropharyngeal erythema present. There is redness at the posterior margin of her gums from her dentures. There is also some noted on anterior gums as well. Eyes: Pupils are equal, round, and reactive to light. Conjunctivae, EOM and lids are normal.   Amblyoplia R eye (OD)   Neck: Phonation normal. Neck supple. No JVD present. Carotid bruit is not present. No thyromegaly present. Cardiovascular: Normal rate, regular rhythm and normal heart sounds. No extrasystoles are present. PMI is not displaced. Exam reveals no gallop and no friction rub. No murmur heard. Pulmonary/Chest: Effort normal and breath sounds normal. No respiratory distress. She has no wheezes. She has no rhonchi. She has no rales. Abdominal: Soft. Bowel sounds are normal. She exhibits no distension ( ) and no mass. There is no hepatosplenomegaly. There is no tenderness. There is no rebound, no guarding and no CVA tenderness. Genitourinary:   Genitourinary Comments: Examination deferred   Musculoskeletal: Normal range of motion. She exhibits no edema or tenderness. Joint examination reveals no acute arthritis or synovitis. Contracted L  UE  She has atrophy of her L LE   Lymphadenopathy:     She has no cervical adenopathy. Neurological: She is alert and oriented to person, place, and time. She has normal strength. She displays no atrophy and no tremor. No cranial nerve deficit (by gross examination) or sensory deficit. Gait normal.   She has very limited mobility of her entire left side.   She has limited mobility of her right lower extremities as well and has generalized weakness with inability to withstand her no

## 2022-05-31 NOTE — PROGRESS NOTE ADULT - SUBJECTIVE AND OBJECTIVE BOX
HPI:  HPI: 71y Female with no past medical history, not on any meds, just got off a long flight from [place] this past Wednesday presents with left sided hemiparesis. LKW 2:30 PM today when she was with son (Axel [phone], at bedside). Then around 4pm, family found patient with profound left sided weakness. EMS was called. BP with /100. Per EMS, no movement in the left arm nor leg, left facial droop, and right gaze preference and could not overcome.     On presentation to ED, 163/83 with improving exam. AOx3, answering questions and following commands. Right gaze preference and can easily overcome when attention brought to the left side. LUE with drift, does not hit bed. Minor left facial droop (although son says patient's face at baseline), extinguishes to DST on the left. NIHSS 4. CTH with no hemorrhage. CTP with elevated Tmax in the R M2 region. CTA head and neck with proximal occlusion of two posterior right M2. Patient is a tpa candidate as CTH is negative for hemorrhage, within window and although improving exam, with disabling deficits. While mixing tpa, patient exam improved to an NIHSS 0. Drift no longer present, gaze midline and attending bilaterally without preference, sensation intact b/l without extinguishing on DST. Decision made to not give tpa due to return to baseline. Endovascular neurosurgery consulted regarding RM2 occlusion. Patient currently not a thrombectomy candidate due to NIHSS 0 with no focal deficits.     Patient denies CP, SOB, prior episodes of weakness and numbness. She has b/l LE flushing and "heat" occasionally, but never weakness, numbness nor paresthesia.     OVERNIGHT EVENTS: BRIAN overnight, neuro stable      Vital Signs Last 24 Hrs  T(C): 36.9 (31 May 2022 13:15), Max: 36.9 (31 May 2022 13:15)  T(F): 98.5 (31 May 2022 13:15), Max: 98.5 (31 May 2022 13:15)  HR: 56 (31 May 2022 17:00) (55 - 73)  BP: 168/68 (31 May 2022 17:00) (109/49 - 182/82)  BP(mean): 98 (31 May 2022 17:00) (71 - 121)  RR: 23 (31 May 2022 17:00) (13 - 27)  SpO2: 99% (31 May 2022 17:00) (95% - 100%)    I&O's Summary    30 May 2022 07:01  -  31 May 2022 07:00  --------------------------------------------------------  IN: 1290.1 mL / OUT: 400 mL / NET: 890.1 mL    31 May 2022 07:01  -  31 May 2022 18:30  --------------------------------------------------------  IN: 133.2 mL / OUT: 1300 mL / NET: -1166.8 mL        PHYSICAL EXAM:  General: NAD, pt is comfortably sitting up in bed, on room air  HEENT: CN II-XII grossly intact, PERRL 3mm briskly reactive, EOMI b/l, face symmetric, tongue midline, neck FROM  Cardiovascular: RRR, normal S1 and S2   Respiratory: lungs CTAB, no wheezing, rhonchi, or crackles   GI: normoactive BS to auscultation, abd soft, NTND   Neuro: A&Ox3, No aphasia, speech clear, no dysmetria, no pronator drift. Follows commands.  BEY x4 spontaneously, 5/5 strength in all extremities throughout. SILT throughout   Extremities: distal pulses 2+ x4    LABS:                        14.3   5.35  )-----------( 348      ( 31 May 2022 05:07 )             43.8     05-31    142  |  107  |  11  ----------------------------<  100<H>  3.8   |  23  |  0.80    Ca    9.3      31 May 2022 05:07  Phos  3.8     05-31  Mg     2.1     05-31    TPro  7.4  /  Alb  4.1  /  TBili  1.1  /  DBili  x   /  AST  18  /  ALT  14  /  AlkPhos  95  05-31    PTT - ( 31 May 2022 05:07 )  PTT:60.5 sec        CAPILLARY BLOOD GLUCOSE          Drug Levels: [] N/A    CSF Analysis: [] N/A      Allergies    No Known Allergies    Intolerances      MEDICATIONS:  Antibiotics:    Neuro:    Anticoagulation:  heparin  Infusion 750 Unit(s)/Hr IV Continuous <Continuous>    OTHER:  midodrine 5 milliGRAM(s) Oral every 8 hours  norepinephrine Infusion 0.05 MICROgram(s)/kG/Min IV Continuous <Continuous>  polyethylene glycol 3350 17 Gram(s) Oral daily    IVF:    CULTURES:    RADIOLOGY & ADDITIONAL TESTS:  SPECT part 1 completed        ASSESSMENT:  71y Female s/p    STROKE    Handoff    MEWS Score    No pertinent past medical history    No pertinent past medical history    Stroke    No significant past surgical history    No significant past surgical history    STROKE    [email or web address]        PLAN:  NEURO:    CARDIOVASCULAR:    PULMONARY:    GI:    :    RENAL:    HEME:    ID:    ENDO:    DVT PROPHYLAXIS:  [] Venodynes                                [] Heparin/Lovenox    DISPOSITION:    Assessment:  Present when checked    []  GCS  E   V  M     Heart Failure: []Acute, [] acute on chronic , []chronic  Heart Failure:  [] Diastolic (HFpEF), [] Systolic (HFrEF), []Combined (HFpEF and HFrEF), [] RHF, [] Pulm HTN, [] Other    [] LUISA, [] ATN, [] AIN, [] other  [] CKD1, [] CKD2, [] CKD 3, [] CKD 4, [] CKD 5, []ESRD    Encephalopathy: [] Metabolic, [] Hepatic, [] toxic, [] Neurological, [] Other    Abnormal Nurtitional Status: [] malnurtition (see nutrition note), [ ]underweight: BMI < 19, [] morbid obesity: BMI >40, [] Cachexia    [] Sepsis  [] hypovolemic shock,[] cardiogenic shock, [] hemorrhagic shock, [] neuogenic shock  [] Acute Respiratory Failure  []Cerebral edema, [] Brain compression/ herniation,   [] Functional quadriplegia  [] Acute blood loss anemia   HPI:  HPI: 71y Female with no past medical history, not on any meds, just got off a long flight from [place] this past Wednesday presents with left sided hemiparesis. LKW 2:30 PM today when she was with son (Axel [phone], at bedside). Then around 4pm, family found patient with profound left sided weakness. EMS was called. BP with /100. Per EMS, no movement in the left arm nor leg, left facial droop, and right gaze preference and could not overcome.     On presentation to ED, 163/83 with improving exam. AOx3, answering questions and following commands. Right gaze preference and can easily overcome when attention brought to the left side. LUE with drift, does not hit bed. Minor left facial droop (although son says patient's face at baseline), extinguishes to DST on the left. NIHSS 4. CTH with no hemorrhage. CTP with elevated Tmax in the R M2 region. CTA head and neck with proximal occlusion of two posterior right M2. Patient is a tpa candidate as CTH is negative for hemorrhage, within window and although improving exam, with disabling deficits. While mixing tpa, patient exam improved to an NIHSS 0. Drift no longer present, gaze midline and attending bilaterally without preference, sensation intact b/l without extinguishing on DST. Decision made to not give tpa due to return to baseline. Endovascular neurosurgery consulted regarding RM2 occlusion. Patient currently not a thrombectomy candidate due to NIHSS 0 with no focal deficits.     Patient denies CP, SOB, prior episodes of weakness and numbness. She has b/l LE flushing and "heat" occasionally, but never weakness, numbness nor paresthesia.     OVERNIGHT EVENTS: BRIAN overnight, neuro stable      Vital Signs Last 24 Hrs  T(C): 36.9 (31 May 2022 13:15), Max: 36.9 (31 May 2022 13:15)  T(F): 98.5 (31 May 2022 13:15), Max: 98.5 (31 May 2022 13:15)  HR: 56 (31 May 2022 17:00) (55 - 73)  BP: 168/68 (31 May 2022 17:00) (109/49 - 182/82)  BP(mean): 98 (31 May 2022 17:00) (71 - 121)  RR: 23 (31 May 2022 17:00) (13 - 27)  SpO2: 99% (31 May 2022 17:00) (95% - 100%)    I&O's Summary    30 May 2022 07:01  -  31 May 2022 07:00  --------------------------------------------------------  IN: 1290.1 mL / OUT: 400 mL / NET: 890.1 mL    31 May 2022 07:01  -  31 May 2022 18:30  --------------------------------------------------------  IN: 133.2 mL / OUT: 1300 mL / NET: -1166.8 mL        PHYSICAL EXAM:  General: NAD, pt is comfortably sitting up in bed, on room air  HEENT: CN II-XII grossly intact, PERRL 3mm briskly reactive, EOMI b/l, face symmetric, tongue midline, neck FROM  Cardiovascular: RRR, normal S1 and S2   Respiratory: lungs CTAB, no wheezing, rhonchi, or crackles   GI: normoactive BS to auscultation, abd soft, NTND   Neuro: A&Ox3, No aphasia, speech clear, no dysmetria, no pronator drift. Follows commands.  BEY x4 spontaneously, 5/5 strength in all extremities throughout. SILT throughout   Extremities: distal pulses 2+ x4    LABS:                        14.3   5.35  )-----------( 348      ( 31 May 2022 05:07 )             43.8     05-31    142  |  107  |  11  ----------------------------<  100<H>  3.8   |  23  |  0.80    Ca    9.3      31 May 2022 05:07  Phos  3.8     05-31  Mg     2.1     05-31    TPro  7.4  /  Alb  4.1  /  TBili  1.1  /  DBili  x   /  AST  18  /  ALT  14  /  AlkPhos  95  05-31    PTT - ( 31 May 2022 05:07 )  PTT:60.5 sec        CAPILLARY BLOOD GLUCOSE          Drug Levels: [] N/A    CSF Analysis: [] N/A      Allergies    No Known Allergies    Intolerances      MEDICATIONS:  Antibiotics:    Neuro:    Anticoagulation:  heparin  Infusion 750 Unit(s)/Hr IV Continuous <Continuous>    OTHER:  midodrine 5 milliGRAM(s) Oral every 8 hours  norepinephrine Infusion 0.05 MICROgram(s)/kG/Min IV Continuous <Continuous>  polyethylene glycol 3350 17 Gram(s) Oral daily    IVF:    CULTURES:    RADIOLOGY & ADDITIONAL TESTS:  SPECT part 1 completed  < from: CT Head No Cont (05.30.22 @ 15:07) >  Findings:    The ventricles and sulci arewithin normal limits for patient's stated   age.    No acute intracranial hemorrhage, extra-axial fluid collection, mass   effect or midline shift..    There is interval development of small focal hypodensity within the right   insula (series 2 image10) suspicious for acute infarction. There is no   significant mass effect. There is no evidence of hemorrhagic   transformation..    The bones of the calvarium are intact.    The paranasal sinuses and bilateral mastoid complexes are well aerated.    Impression:    Interval development of a small focal hypodensity in the right insula   suspicious for acute infarction. No mass effect or hemorrhagic   transformation.      < end of copied text >        ASSESSMENT:   72 y/o F with no pmh and recent long flight from [place] on Wednesday presents with L hemiparesis, L facial droop and R gaze deviation. LKW 2:30pm 5/29. NIHSS improved to 0 in ER and no TPA given.   CTP with elevated Tmax in the R M2 region. CTA head and neck with proximal occlusion of two posterior right M2.  Pressure dependent requiring pressors to maintain -180. Pending SPECT, MR, MRA NOVA for further work up.     PLAN  - Obtain MRA nova and MR  - F/u SPECT results  - Maintain pressures, ween pressors as appropriate  - Care per primary team    Please call neurosurgery consult phone with any concerns or neurological changes [phone]     Assessment and plan discussed with Dr. Burgos

## 2022-05-31 NOTE — PROGRESS NOTE ADULT - SUBJECTIVE AND OBJECTIVE BOX
MACRINA VALDEZ, 71y, Female  MRN-[id number]  Patient is a 71y old  Female who presents with a chief complaint of STROKE     (31 May 2022 10:16)      OVERNIGHT EVENTS: Overnight, norepinephrine up-titrated to maintain SBP range of 140-180. No neurologic changes at that time.     SUBJECTIVE: Pt seen/examined at bedside. Patient denying any new or changing symptoms and states she feels okay at this time.     12 Point ROS Negative unless noted otherwise above.  -------------------------------------------------------------------------------  VITAL SIGNS:  Vital Signs Last 24 Hrs  T(C): 36.8 (31 May 2022 09:04), Max: 37 (30 May 2022 17:36)  T(F): 98.3 (31 May 2022 09:04), Max: 98.6 (30 May 2022 17:36)  HR: 65 (31 May 2022 10:00) (55 - 67)  BP: 165/70 (31 May 2022 10:00) (109/49 - 182/82)  BP(mean): 100 (31 May 2022 10:00) (71 - 118)  RR: 22 (31 May 2022 10:00) (13 - 29)  SpO2: 100% (31 May 2022 10:00) (95% - 100%)  I&O's Summary    30 May 2022 07:01  -  31 May 2022 07:00  --------------------------------------------------------  IN: 1290.1 mL / OUT: 400 mL / NET: 890.1 mL    31 May 2022 07:01  -  31 May 2022 11:44  --------------------------------------------------------  IN: 38.2 mL / OUT: 0 mL / NET: 38.2 mL        PHYSICAL EXAM:    General: NAD; laying in bed flat; speaking in full sentences   HEENT: NC/AT; moist mucosal membranes.  Neck: supple, trachea midline  Cardiovascular: RRR, +S1/S2; NO M/R/G  Respiratory: CTA B/L; no W/R/R  Gastrointestinal: soft, NT/ND; +BSx4  Extremities: WWP; no edema or cyanosis  Vascular: 2+ radial, DP/PT pulses B/L  Neurological: AAOx3; no focal deficits    ALLERGIES:  Allergies    No Known Allergies    Intolerances        MEDICATIONS:  MEDICATIONS  (STANDING):  acetaZOLAMIDE  IVPB 1000 milliGRAM(s) IV Intermittent once  heparin  Infusion 750 Unit(s)/Hr (7.5 mL/Hr) IV Continuous <Continuous>  norepinephrine Infusion 0.05 MICROgram(s)/kG/Min (7.82 mL/Hr) IV Continuous <Continuous>  polyethylene glycol 3350 17 Gram(s) Oral daily    MEDICATIONS  (PRN):      -------------------------------------------------------------------------------  LABS:                        14.3   5.35  )-----------( 348      ( 31 May 2022 05:07 )             43.8     05-31    142  |  107  |  11  ----------------------------<  100<H>  3.8   |  23  |  0.80    Ca    9.3      31 May 2022 05:07  Phos  3.8     05-31  Mg     2.1     05-31    TPro  7.4  /  Alb  4.1  /  TBili  1.1  /  DBili  x   /  AST  18  /  ALT  14  /  AlkPhos  95  05-31    LIVER FUNCTIONS - ( 31 May 2022 05:07 )  Alb: 4.1 g/dL / Pro: 7.4 g/dL / ALK PHOS: 95 U/L / ALT: 14 U/L / AST: 18 U/L / GGT: x           PT/INR - ( 29 May 2022 17:36 )   PT: 13.1 sec;   INR: 1.10          PTT - ( 31 May 2022 05:07 )  PTT:60.5 sec    CAPILLARY BLOOD GLUCOSE  78 (29 May 2022 18:13)      POCT Blood Glucose.: 78 mg/dL (29 May 2022 17:31)      COVID-19 PCR: Negative (29 May 2022 19:30)      RADIOLOGY & ADDITIONAL TESTS: Reviewed.

## 2022-05-31 NOTE — CONSULT NOTE ADULT - ASSESSMENT
70 y/o F with no pmh and recent long flight from [place] on Wednesday presented to [facility] on 5/29 with L hemiparesis, L facial droop and R gaze deviation. Initial NIHSS 4. Initial CTH without acute abnormality. CTP with elevated Tmax in the R M2 region. CTA head and neck with proximal occlusion of two posterior right M2. Patient improved to NIHSS 0 so no TPA was given. Repeat CTH on 5/29 with small hypodensity in right insula suspicious for acute infarct. Patient not a thrombectomy candidate. Admitted to MICU for close neuro monitoring and thrombectomy watch.     INCOMPLETE    1) Admitted to MICU for thrombectomy watch   - q1 neuro checks to continue at this time   - hept gtt PTT goal 50-70  - STAT HCT, CTA H/N, CTP if acute change in neuro status   - SBP goal 140-180, c/w levophed, PRN fluid bolus if need to maintain blood pressure goal  - head of bed flat, bedrest   - SCDs  - Stroke Education    3) Other tests:  - f/u MRI, MRA with spect, TTE with bubble. May need LE doppler, hypercoagulable panel if rest of work up unremarkable.   - PT/OT   - Stroke education    4)DVT ppx - SCDs, hep gtt 72 y/o F with no pmh and recent long flight from [place] on Wednesday presented to [facility] on 5/29 with L hemiparesis, L facial droop and R gaze deviation. Initial NIHSS 4. Initial CTH without acute abnormality. CTP with elevated Tmax in the R M2 region. CTA head and neck with proximal occlusion of two posterior right M2. Patient improved to NIHSS 0 so no TPA was given. Repeat CTH on 5/29 with small hypodensity in right insula suspicious for acute infarct. Patient not a thrombectomy candidate. Admitted to MICU for close neuro monitoring and thrombectomy watch.     INCOMPLETE    1) Admitted to MICU for thrombectomy watch   - q1 neuro checks to continue at this time   - hept gtt PTT goal 50-70  - STAT HCT, CTA H/N, CTP if acute change in neuro status   - SBP goal >140, start medridine 5mg TID, wean off levophed, PRN fluid bolus if need to maintain blood pressure goal  - head of bed flat, except when eating   - SCDs  - Stroke Education    3) Other tests:  - f/u MRI, MRA spect and NOVA, TTE with bubble. May need LE doppler, hypercoagulable panel if rest of work up unremarkable.   - PT/OT   - Stroke education    4)DVT ppx - SCDs, hep gtt 72 y/o F with no pmh and recent long flight from [place] on Wednesday presented to [facility] on 5/29 with L hemiparesis, L facial droop and R gaze deviation. Initial NIHSS 4. Initial CTH without acute abnormality. CTP with elevated Tmax in the R M2 region. CTA head and neck with proximal occlusion of two posterior right M2. Patient improved to NIHSS 0 so no TPA was given. Repeat CTH on 5/29 with small hypodensity in right insula suspicious for acute infarct. Patient not a thrombectomy candidate. Admitted to MICU for close neuro monitoring and thrombectomy watch.     INCOMPLETE    1) Admitted to MICU for thrombectomy watch   - q1 neuro checks to continue at this time   - hept gtt PTT goal 50-70  - STAT HCT, CTA H/N, CTP if acute change in neuro status   - SBP goal >140, start medridine 5mg TID, wean off levophed, PRN fluid bolus if need to maintain blood pressure goal  - head of bed flat, except when eating   - SCDs  - Stroke Education    3) Other tests:  - f/u MRI, MRA spect and NOVA, TTE with bubble. May need LE doppler, hypercoagulable panel if rest of work up unremarkable.   - PT/OT   - Stroke education    4)DVT ppx - SCDs, hep gtt    Discussed with stroke attending Dr. Christianson 70 y/o F with no pmh and recent long flight from [place] on Wednesday presented to [facility] on 5/29 with L hemiparesis, L facial droop and R gaze deviation. Initial NIHSS 4. Initial CTH without acute abnormality. CTP with elevated Tmax in the R M2 region. CTA head and neck with proximal occlusion of two posterior right M2. Patient improved to NIHSS 0 so no TPA was given. Repeat CTH on 5/29 with small hypodensity in right insula suspicious for acute infarct. Patient not a thrombectomy candidate. Admitted to MICU for close neuro monitoring and thrombectomy watch.     INCOMPLETE    1) Admitted to MICU for thrombectomy watch   - q1 neuro checks to continue at this time   - hept gtt PTT goal 50-70  - STAT HCT, CTA H/N, CTP if acute change in neuro status   - SBP goal >140, start medridine 5mg TID, wean off levophed, PRN fluid bolus if need to maintain blood pressure goal  - head of bed flat, except when eating   - SCDs  - Stroke Education    3) Other tests:  - f/u MRI, MRA spect and NOVA, TTE with bubble.   - LE doppler neg  - may need hypercoagulable panel if rest of work up unremarkable.   - PT/OT   - Stroke education    4)DVT ppx - SCDs, hep gtt    Discussed with stroke attending Dr. Christianson 70 y/o F with no pmh and recent long flight from [place] on Wednesday presented to [facility] on 5/29 with L hemiparesis, L facial droop and R gaze deviation. Initial NIHSS 4. Initial CTH without acute abnormality. CTP with elevated Tmax in the R M2 region. CTA head and neck with proximal occlusion of two posterior right M2. Patient improved to NIHSS 0 so no TPA was given. Repeat CTH on 5/29 with small hypodensity in right insula suspicious for acute infarct. Patient not a thrombectomy candidate. Admitted to MICU for close neuro monitoring and thrombectomy watch.     1) Admitted to MICU for thrombectomy watch   - q1 neuro checks to continue at this time   - hept gtt PTT goal 50-70  - STAT HCT, CTA H/N, CTP if acute change in neuro status   - SBP goal >140, start medodrine 5mg TID, wean off levophed, PRN fluid bolus if need to maintain blood pressure goal  - head of bed flat, except when eating   - SCDs  - Stroke Education    3) Other tests:  - recommend MRI, MRA spect and NOVA  - recommend TTE with bubble  - recommend LALO (NPO after midnight), consider ILR as strokes are cardio-embolic looking   - LE doppler neg  - may need hypercoagulable panel if rest of work up unremarkable  - PT/OT - bed rest for now  - Stroke education    4)DVT ppx - SCDs, hep gtt    Discussed with stroke attending Dr. Christianson

## 2022-05-31 NOTE — CONSULT NOTE ADULT - ASSESSMENT
per Neurology    72 y/o F with no pmh and recent long flight from [place] on Wednesday presented to [facility] on 5/29 with L hemiparesis, L facial droop and R gaze deviation. Initial NIHSS 4. Initial CTH without acute abnormality. CTP with elevated Tmax in the R M2 region. CTA head and neck with proximal occlusion of two posterior right M2. Patient improved to NIHSS 0 so no TPA was given. Repeat CTH on 5/29 with small hypodensity in right insula suspicious for acute infarct. Patient not a thrombectomy candidate. Admitted to MICU for close neuro monitoring and thrombectomy watch.     1) Admitted to MICU for thrombectomy watch   - q1 neuro checks to continue at this time   - hept gtt PTT goal 50-70  - STAT HCT, CTA H/N, CTP if acute change in neuro status   - SBP goal >140, start medodrine 5mg TID, wean off levophed, PRN fluid bolus if need to maintain blood pressure goal  - head of bed flat, except when eating   - SCDs  - Stroke Education    3) Other tests:  - recommend MRI, MRA spect and NOVA  - recommend TTE with bubble  - recommend LALO (NPO after midnight), consider ILR as strokes are cardio-embolic looking   - LE doppler neg  - may need hypercoagulable panel if rest of work up unremarkable  - PT/OT - bed rest for now  - Stroke education    4)DVT ppx - SCDs, hep gtt

## 2022-05-31 NOTE — PROGRESS NOTE ADULT - ASSESSMENT
71y Female with no past medical history, not on any meds, just got off a long flight from [place] this past Wednesday presents with left sided hemiparesis. LKW 2:30 PM 5/29 when she was with son and was found with left sided weakness around 4 pm. On immediate presentation to the ED, NIHSS 4 which improved to NIHSS 0 after CTH scans. CTH with no hemorrhage. CTP with elevated Tmax in the R M2 region. CTA head and neck with proximal occlusion of two posterior right M2. Patient no longer a tap candidate due to return to baseline. Discussed case with neurovascular. Patient not a thrombectomy candidate at this time.     Discussed plan with patient and son to admit to ICU for close neuro monitoring and possible intervention if she were to neurologically change and IV medication treatment to treat occlusion. Patient initially did not wish to be admitted to the ICU because she is currently doing well, back at baseline. Educated patient that while she definitely improved, she has a blood clot that will cause her to have a stroke and is a high risk for disabling deficits if the vessel were to occlude or clot to embolize. Also emphasized that she will need blood thinner through the IV and aspirin or any other PO medication would not be enough. In addition, she would still need to be admitted for a stroke work up. Patient discusssed with son and other children regarding admission. Also had concerns of cost (as she is visiting and has no insurance) and questions the necessity of ICU. After extensive conversation with son (Maxx) at bedside and patient, patient is willing to be admitted to ICU for close neuro monitoring for thrombectomy watch.     1) Admitted to MICU for thrombectomy watch i/s/o MCA bifurcation occlusion   - q1 neuro checks to continue at this time   - hept gtt PTT goal 50-70 -- continue monitoring aPTT in AMs (therapeutic x3)   - will obtain STAT HCT, CTA H/N, CTP if acute change in neuro status   - SBP goal 140-180, start fluids or pressors if need to maintain blood pressure goal. Patient started on pressors overnight to maintain SBP >140   - head of bed flat, bedrest   - SCDs in place   - patient continued on levophed with SBP goal of 140-180. Starting midodrine 5mg TID with attempt to wean off levophed  - patient pending MR and MR NOVA       4)DVT ppx - SCDs, hep gtt being monitored by aPTT

## 2022-05-31 NOTE — DIETITIAN INITIAL EVALUATION ADULT - OTHER INFO
72 y/o F with no pmh and recent long flight from [place] on Wednesday presented to [facility] on 5/29 with L hemiparesis, L facial droop and R gaze deviation. Initial NIHSS 4. Initial CTH without acute abnormality. CTP with elevated Tmax in the R M2 region. CTA head and neck with proximal occlusion of two posterior right M2. Patient improved to NIHSS 0 so no TPA was given. Repeat CTH on 5/29 with small hypodensity in right insula suspicious for acute infarct. Patient not a thrombectomy candidate. Admitted to MICU for close neuro monitoring and thrombectomy watch.     Pt seen at bedside for initial assessment- on pressor support (Levo). Last documented bowel movement 5/30. Confirms NKFA. Denies change in appetite PTA. Reports usual body weight 70 kg (dosing weight 183 pounds; 83 kg). Verbalizes no recent weight loss; ? accuracy of dosing weight. No edema documented at this time. No pressure ulcers documented at this time. Blade score=18. Labs reviewed 5/31; electrolytes within normal limits at this time. Observed pt with no overt signs of muscle or fat wasting. Based on ASPEN guidelines, pt does not meet criteria for malnutrition at this time. Discussed current diet order DASH/TLC; provided pt with diet education regarding importance of meeting nutritional needs; amenable to education. Pt reports feeling hungry during hospital stay, able to complete >75% of meals. Made aware RD remains available. RD to follow up per protocol. See nutrition recommendations below.

## 2022-06-01 DIAGNOSIS — I63.9 CEREBRAL INFARCTION, UNSPECIFIED: ICD-10-CM

## 2022-06-01 LAB
ALBUMIN SERPL ELPH-MCNC: 4 G/DL — SIGNIFICANT CHANGE UP (ref 3.3–5)
ALP SERPL-CCNC: 93 U/L — SIGNIFICANT CHANGE UP (ref 40–120)
ALT FLD-CCNC: 13 U/L — SIGNIFICANT CHANGE UP (ref 10–45)
ANION GAP SERPL CALC-SCNC: 11 MMOL/L — SIGNIFICANT CHANGE UP (ref 5–17)
APTT BLD: 65.3 SEC — HIGH (ref 27.5–35.5)
AST SERPL-CCNC: 17 U/L — SIGNIFICANT CHANGE UP (ref 10–40)
BASOPHILS # BLD AUTO: 0.03 K/UL — SIGNIFICANT CHANGE UP (ref 0–0.2)
BASOPHILS NFR BLD AUTO: 0.5 % — SIGNIFICANT CHANGE UP (ref 0–2)
BILIRUB SERPL-MCNC: 0.8 MG/DL — SIGNIFICANT CHANGE UP (ref 0.2–1.2)
BUN SERPL-MCNC: 13 MG/DL — SIGNIFICANT CHANGE UP (ref 7–23)
CALCIUM SERPL-MCNC: 9.7 MG/DL — SIGNIFICANT CHANGE UP (ref 8.4–10.5)
CHLORIDE SERPL-SCNC: 106 MMOL/L — SIGNIFICANT CHANGE UP (ref 96–108)
CO2 SERPL-SCNC: 24 MMOL/L — SIGNIFICANT CHANGE UP (ref 22–31)
CREAT SERPL-MCNC: 0.91 MG/DL — SIGNIFICANT CHANGE UP (ref 0.5–1.3)
EGFR: 67 ML/MIN/1.73M2 — SIGNIFICANT CHANGE UP
EOSINOPHIL # BLD AUTO: 0.12 K/UL — SIGNIFICANT CHANGE UP (ref 0–0.5)
EOSINOPHIL NFR BLD AUTO: 2.2 % — SIGNIFICANT CHANGE UP (ref 0–6)
GLUCOSE SERPL-MCNC: 110 MG/DL — HIGH (ref 70–99)
HCT VFR BLD CALC: 44.5 % — SIGNIFICANT CHANGE UP (ref 34.5–45)
HCYS SERPL-MCNC: 8.3 UMOL/L — SIGNIFICANT CHANGE UP
HGB BLD-MCNC: 14.6 G/DL — SIGNIFICANT CHANGE UP (ref 11.5–15.5)
IMM GRANULOCYTES NFR BLD AUTO: 0.2 % — SIGNIFICANT CHANGE UP (ref 0–1.5)
INR BLD: 1.15 — SIGNIFICANT CHANGE UP (ref 0.88–1.16)
LYMPHOCYTES # BLD AUTO: 1.81 K/UL — SIGNIFICANT CHANGE UP (ref 1–3.3)
LYMPHOCYTES # BLD AUTO: 32.8 % — SIGNIFICANT CHANGE UP (ref 13–44)
MAGNESIUM SERPL-MCNC: 2.3 MG/DL — SIGNIFICANT CHANGE UP (ref 1.6–2.6)
MCHC RBC-ENTMCNC: 29.9 PG — SIGNIFICANT CHANGE UP (ref 27–34)
MCHC RBC-ENTMCNC: 32.8 GM/DL — SIGNIFICANT CHANGE UP (ref 32–36)
MCV RBC AUTO: 91 FL — SIGNIFICANT CHANGE UP (ref 80–100)
MONOCYTES # BLD AUTO: 0.39 K/UL — SIGNIFICANT CHANGE UP (ref 0–0.9)
MONOCYTES NFR BLD AUTO: 7.1 % — SIGNIFICANT CHANGE UP (ref 2–14)
NEUTROPHILS # BLD AUTO: 3.16 K/UL — SIGNIFICANT CHANGE UP (ref 1.8–7.4)
NEUTROPHILS NFR BLD AUTO: 57.2 % — SIGNIFICANT CHANGE UP (ref 43–77)
NRBC # BLD: 0 /100 WBCS — SIGNIFICANT CHANGE UP (ref 0–0)
PHOSPHATE SERPL-MCNC: 3.5 MG/DL — SIGNIFICANT CHANGE UP (ref 2.5–4.5)
PLATELET # BLD AUTO: 319 K/UL — SIGNIFICANT CHANGE UP (ref 150–400)
POTASSIUM SERPL-MCNC: 3.8 MMOL/L — SIGNIFICANT CHANGE UP (ref 3.5–5.3)
POTASSIUM SERPL-SCNC: 3.8 MMOL/L — SIGNIFICANT CHANGE UP (ref 3.5–5.3)
PROT SERPL-MCNC: 7.5 G/DL — SIGNIFICANT CHANGE UP (ref 6–8.3)
PROTHROM AB SERPL-ACNC: 13.7 SEC — HIGH (ref 10.5–13.4)
RBC # BLD: 4.89 M/UL — SIGNIFICANT CHANGE UP (ref 3.8–5.2)
RBC # FLD: 13.4 % — SIGNIFICANT CHANGE UP (ref 10.3–14.5)
SODIUM SERPL-SCNC: 141 MMOL/L — SIGNIFICANT CHANGE UP (ref 135–145)
WBC # BLD: 5.52 K/UL — SIGNIFICANT CHANGE UP (ref 3.8–10.5)
WBC # FLD AUTO: 5.52 K/UL — SIGNIFICANT CHANGE UP (ref 3.8–10.5)

## 2022-06-01 PROCEDURE — 99233 SBSQ HOSP IP/OBS HIGH 50: CPT

## 2022-06-01 PROCEDURE — 78832 RP LOCLZJ TUM SPECT W/CT 2: CPT | Mod: 26

## 2022-06-01 RX ORDER — MIDODRINE HYDROCHLORIDE 2.5 MG/1
10 TABLET ORAL EVERY 8 HOURS
Refills: 0 | Status: DISCONTINUED | OUTPATIENT
Start: 2022-06-02 | End: 2022-06-07

## 2022-06-01 RX ORDER — POTASSIUM CHLORIDE 20 MEQ
20 PACKET (EA) ORAL ONCE
Refills: 0 | Status: COMPLETED | OUTPATIENT
Start: 2022-06-01 | End: 2022-06-01

## 2022-06-01 RX ORDER — MIDODRINE HYDROCHLORIDE 2.5 MG/1
5 TABLET ORAL ONCE
Refills: 0 | Status: COMPLETED | OUTPATIENT
Start: 2022-06-01 | End: 2022-06-01

## 2022-06-01 RX ORDER — ACETAZOLAMIDE 250 MG/1
1000 TABLET ORAL ONCE
Refills: 0 | Status: COMPLETED | OUTPATIENT
Start: 2022-06-01 | End: 2022-06-01

## 2022-06-01 RX ADMIN — MIDODRINE HYDROCHLORIDE 5 MILLIGRAM(S): 2.5 TABLET ORAL at 10:37

## 2022-06-01 RX ADMIN — MIDODRINE HYDROCHLORIDE 5 MILLIGRAM(S): 2.5 TABLET ORAL at 18:31

## 2022-06-01 RX ADMIN — POLYETHYLENE GLYCOL 3350 17 GRAM(S): 17 POWDER, FOR SOLUTION ORAL at 13:15

## 2022-06-01 RX ADMIN — MIDODRINE HYDROCHLORIDE 5 MILLIGRAM(S): 2.5 TABLET ORAL at 03:27

## 2022-06-01 RX ADMIN — ACETAZOLAMIDE 360 MILLIGRAM(S): 250 TABLET ORAL at 09:54

## 2022-06-01 RX ADMIN — MIDODRINE HYDROCHLORIDE 5 MILLIGRAM(S): 2.5 TABLET ORAL at 23:21

## 2022-06-01 RX ADMIN — Medication 20 MILLIEQUIVALENT(S): at 09:54

## 2022-06-01 NOTE — PROGRESS NOTE ADULT - ASSESSMENT
71y Female with no past medical history, not on any meds, just got off a long flight from [place] this past Wednesday presents with left sided hemiparesis. LKW 2:30 PM 5/29 when she was with son and was found with left sided weakness around 4 pm. On immediate presentation to the ED, NIHSS 4 which improved to NIHSS 0 after CTH scans. CTH with no hemorrhage. CTP with elevated Tmax in the R M2 region. CTA head and neck with proximal occlusion of two posterior right M2. Patient no longer a tap candidate due to return to baseline. Discussed case with neurovascular. Patient not a thrombectomy candidate at this time.     Discussed plan with patient and son to admit to ICU for close neuro monitoring and possible intervention if she were to neurologically change and IV medication treatment to treat occlusion. Patient initially did not wish to be admitted to the ICU because she is currently doing well, back at baseline. Educated patient that while she definitely improved, she has a blood clot that will cause her to have a stroke and is a high risk for disabling deficits if the vessel were to occlude or clot to embolize. Also emphasized that she will need blood thinner through the IV and aspirin or any other PO medication would not be enough. In addition, she would still need to be admitted for a stroke work up. Patient discusssed with son and other children regarding admission. Also had concerns of cost (as she is visiting and has no insurance) and questions the necessity of ICU. After extensive conversation with son (Maxx) at bedside and patient, patient is willing to be admitted to ICU for close neuro monitoring for thrombectomy watch.     1) Admitted to MICU for thrombectomy watch i/s/o MCA bifurcation occlusion   - q1 neuro checks to continue at this time   - hept gtt PTT goal 50-70 -- continue monitoring aPTT in AMs (therapeutic x3)   - will obtain STAT HCT, CTA H/N, CTP if acute change in neuro status   - SBP goal 140-180, start fluids or pressors if need to maintain blood pressure goal. Patient started on pressors overnight to maintain SBP >140   - head of bed flat, bedrest   - SCDs in place   - patient continued on levophed with SBP goal of 140-180. Starting midodrine 5mg TID with attempt to wean off levophed  - patient pending MR and MR NOVA       4)DVT ppx - SCDs, hep gtt being monitored by aPTT  71y Female with no past medical history, not on any meds, just got off a long flight from [place] this past Wednesday presents with left sided hemiparesis. LKW 2:30 PM 5/29 when she was with son and was found with left sided weakness around 4 pm. On immediate presentation to the ED, NIHSS 4 which improved to NIHSS 0 after CTH scans. CTH with no hemorrhage. CTP with elevated Tmax in the R M2 region. CTA head and neck with proximal occlusion of two posterior right M2. Patient no longer a tap candidate due to return to baseline. Discussed case with neurovascular. Patient not a thrombectomy candidate at this time.     Discussed plan with patient and son to admit to ICU for close neuro monitoring and possible intervention if she were to neurologically change and IV medication treatment to treat occlusion. Patient initially did not wish to be admitted to the ICU because she is currently doing well, back at baseline. Educated patient that while she definitely improved, she has a blood clot that will cause her to have a stroke and is a high risk for disabling deficits if the vessel were to occlude or clot to embolize. Also emphasized that she will need blood thinner through the IV and aspirin or any other PO medication would not be enough. In addition, she would still need to be admitted for a stroke work up. Patient discusssed with son and other children regarding admission. Also had concerns of cost (as she is visiting and has no insurance) and questions the necessity of ICU. After extensive conversation with son (Maxx) at bedside and patient, patient is willing to be admitted to ICU for close neuro monitoring for thrombectomy watch.     1) Admitted to MICU for thrombectomy watch i/s/o MCA bifurcation occlusion   - q1 neuro checks to continue at this time   - hept gtt PTT goal 50-70 -- continue monitoring aPTT DAILY in AMs (therapeutic x3)   - will obtain STAT HCT, CTA H/N, CTP if acute change in neuro status   - SBP goal 140-180, start fluids or pressors if need to maintain blood pressure goal.  - SCDs in place   - D/C'd levophed  - c/w midodrine 5mg TID with attempt to wean off levophed  - MR and MR NOVA pending results  - Diet started    4)DVT ppx - SCDs, hep gtt being monitored by aPTT daily 71y Female with no past medical history, not on any meds, just got off a long flight from [place] this past Wednesday presents with left sided hemiparesis. LKW 2:30 PM 5/29 when she was with son and was found with left sided weakness around 4 pm. On immediate presentation to the ED, NIHSS 4 which improved to NIHSS 0 after CTH scans. CTH with no hemorrhage. CTP with elevated Tmax in the R M2 region. CTA head and neck with proximal occlusion of two posterior right M2. Patient no longer a tap candidate due to return to baseline. Discussed case with neurovascular. Patient not a thrombectomy candidate at this time.     Discussed plan with patient and son to admit to ICU for close neuro monitoring and possible intervention if she were to neurologically change and IV medication treatment to treat occlusion. Patient initially did not wish to be admitted to the ICU because she is currently doing well, back at baseline. Educated patient that while she definitely improved, she has a blood clot that will cause her to have a stroke and is a high risk for disabling deficits if the vessel were to occlude or clot to embolize. Also emphasized that she will need blood thinner through the IV and aspirin or any other PO medication would not be enough. In addition, she would still need to be admitted for a stroke work up. Patient discusssed with son and other children regarding admission. Also had concerns of cost (as she is visiting and has no insurance) and questions the necessity of ICU. After extensive conversation with son (Maxx) at bedside and patient, patient is willing to be admitted to ICU for close neuro monitoring for thrombectomy watch.     1) Admitted to MICU for thrombectomy watch i/s/o MCA bifurcation occlusion   - q1 neuro checks to continue at this time   - hept gtt PTT goal 50-70 -- continue monitoring aPTT DAILY in AMs (therapeutic x3)   - will obtain STAT HCT, CTA H/N, CTP if acute change in neuro status   - SBP goal 140-180, start fluids or pressors if need to maintain blood pressure goal.  - SCDs in place   - D/C'd levophed  - c/w midodrine 5mg TID with attempt to wean off levophed  - MR and MR NOVA pending results  - Diet started  - TTE done 5/31: LVH, EF 55-60%. Pending LLAO for better visualiazation    4)DVT ppx - SCDs, hep gtt being monitored by aPTT daily

## 2022-06-01 NOTE — PROGRESS NOTE ADULT - ASSESSMENT
Assessment and Plan  71y Female    1)Secondary stroke prevention  -ASA 81 and Plavix 75  -Atorvastatin 80    2) Stroke risk factors  -    3) Further workup   -scheduled for MRI NOVA and SPECT to assess vascular changes/collaterals     4) Stepdown from ICU for continued monitoring    72 y/o F with no pmh and recent long flight from [place] on Wednesday presented to [facility] on 5/29 with L hemiparesis, L facial droop and R gaze deviation. Initial NIHSS 4. Initial CTH without acute abnormality. CTP with elevated Tmax in the R M2 region. CTA head and neck with proximal occlusion of two posterior right M2. Patient improved to NIHSS 0 so no TPA was given. Repeat CTH on 5/29 with small hypodensity in right insula suspicious for acute infarct. Patient not a thrombectomy candidate. Admitted to MICU for close neuro monitoring and thrombectomy watch now step down to [facility].    INCOMPLETE    Neuro  #CVA workup  - q4hr stroke neuro checks and vitals  - f/u MRI, MRA NOVA, SPECT  - hept gtt PTT goal 50-70  - Stroke Code HCT Results: no acute abnormality  - Stroke Code CTA Results: Proximal occlusion of two posterior right M2 branches consistent with thrombosis. Paucity of flow in the posterior right MCA distal to this   level.  - Stroke education    Cards  - Goal SBP >120  - c/w medodrine 5mg TID, levo d/c  - TTE with bubble:    1. Normal left ventricular size and systolic function.   2. Left ventricular endocardium is not well visualizaed, unable to   definitively evaluate left ventricular wall motion.   3. Mild symmetric left ventricular hypertrophy.   4. Normal right ventricular size and systolic function.   5. Normal atria.   6. No significant valvular disease.   7. No pericardial effusion.   8. No prior echo is available for comparison.   9. Consider repeat TTE with imaging solution such as Definity to better   enhance the endocardium and evaluate the left ventricular function, if   clinically indicated.    - LDL results: 65    - LE doppler neg  - may need hypercoagulable panel if rest of work up unremarkable    - f/u LALO, consider ILR     Pulm  - call provider if SPO2 < 94%    GI  #Nutrition/Fluids/Electrolytes   - replete K<4 and Mg <2  - Diet: NPO for LALO  - IVF: n/a    Renal  - 13/0.91    Endocrine  - A1C results: 5.1  - TSH results: 1.04    DVT Prophylaxis  - SCDs and hep gtt  - SCDs for DVT prophylaxis       Dispo: admit to [facility]     Discussed daily hospital plans and goals with patient and family at bedside.     Discussed with Neurology Attending Dr. Christianson   70 y/o F with no pmh and recent long flight from [place] on Wednesday presented to [facility] on 5/29 with L hemiparesis, L facial droop and R gaze deviation. Initial NIHSS 4. Initial CTH without acute abnormality. CTP with elevated Tmax in the R M2 region. CTA head and neck with proximal occlusion of two posterior right M2. Patient improved to NIHSS 0 so no TPA was given. Repeat CTH on 5/29 with small hypodensity in right insula suspicious for acute infarct. Patient not a thrombectomy candidate. Admitted to MICU for close neuro monitoring and thrombectomy watch now step down to [facility].    Neuro  #CVA workup  - q4hr stroke neuro checks and vitals  - f/u MRI, MRA NOVA, SPECT  - hept gtt PTT goal 50-70  - Stroke Code HCT Results: no acute abnormality --> Interval development of a small focal hypodensity in the right insula suspicious for acute infarction. No mass effect or hemorrhagic transformation.  - Stroke Code CTA Results: Proximal occlusion of two posterior right M2 branches consistent with thrombosis. Paucity of flow in the posterior right MCA distal to this level.  - Stroke education    Cards  - Goal SBP >120  - c/w medodrine 5mg TID, levo d/c  - TTE with bubble:    1. Normal left ventricular size and systolic function.   2. Left ventricular endocardium is not well visualizaed, unable to   definitively evaluate left ventricular wall motion.   3. Mild symmetric left ventricular hypertrophy.   4. Normal right ventricular size and systolic function.   5. Normal atria.   6. No significant valvular disease.   7. No pericardial effusion.   8. No prior echo is available for comparison.   9. Consider repeat TTE with imaging solution such as Definity to better   enhance the endocardium and evaluate the left ventricular function, if   clinically indicated.    - LDL results: 65    - LE doppler neg  - may need hypercoagulable panel if rest of work up unremarkable    - f/u LALO (NPO after midght)    Pulm  - call provider if SPO2 < 94%    GI  #Nutrition/Fluids/Electrolytes   - replete K<4 and Mg <2  - Diet: NPO for LALO tonight  - IVF: n/a    Renal  - 13/0.91    Endocrine  - A1C results: 5.1  - TSH results: 1.04    DVT Prophylaxis  - SCDs, hep gtt being monitored by aPTT daily    Dispo: stepdown to [facility]     Discussed daily hospital plans and goals with patient and family at bedside.     Discussed with Neurology Attending Dr. Christianson   70 y/o F with no pmh and recent long flight from [place] on Wednesday presented to [facility] on 5/29 with L hemiparesis, L facial droop and R gaze deviation. Initial NIHSS 4. Initial CTH without acute abnormality. CTP with elevated Tmax in the R M2 region. CTA head and neck with proximal occlusion of two posterior right M2. Patient improved to NIHSS 0 so no TPA was given. Repeat CTH on 5/29 with small hypodensity in right insula suspicious for acute infarct. Patient not a thrombectomy candidate. Admitted to MICU for close neuro monitoring and thrombectomy watch now step down to [facility].    Neuro  #CVA workup  - q4hr stroke neuro checks and vitals  - f/u MRI, MRA NOVA, SPECT  - hept gtt PTT goal 50-70  - Stroke Code HCT Results: no acute abnormality --> Interval development of a small focal hypodensity in the right insula suspicious for acute infarction. No mass effect or hemorrhagic transformation.  - Stroke Code CTA Results: Proximal occlusion of two posterior right M2 branches consistent with thrombosis. Paucity of flow in the posterior right MCA distal to this level.  - Stroke education    Cards  - Goal SBP >120  - c/w medodrine 5mg TID, levo d/c  - TTE with bubble:    1. Normal left ventricular size and systolic function.   2. Left ventricular endocardium is not well visualizaed, unable to   definitively evaluate left ventricular wall motion.   3. Mild symmetric left ventricular hypertrophy.   4. Normal right ventricular size and systolic function.   5. Normal atria.   6. No significant valvular disease.   7. No pericardial effusion.   8. No prior echo is available for comparison.   9. Consider repeat TTE with imaging solution such as Definity to better   enhance the endocardium and evaluate the left ventricular function, if   clinically indicated.    - LDL results: 65  - start atorvastatin 80mg  - LE doppler neg  - may need hypercoagulable panel if rest of work up unremarkable    - f/u LALO (NPO after midght)    Pulm  - call provider if SPO2 < 94%    GI  #Nutrition/Fluids/Electrolytes   - replete K<4 and Mg <2  - Diet: NPO for LALO tonight  - IVF: n/a    Renal  - 13/0.91    Endocrine  - A1C results: 5.1  - TSH results: 1.04    DVT Prophylaxis  - SCDs, hep gtt being monitored by aPTT daily    Dispo: stepdown to [facility]     Discussed daily hospital plans and goals with patient and family at bedside.     Discussed with Neurology Attending Dr. Christianson

## 2022-06-01 NOTE — PROGRESS NOTE ADULT - SUBJECTIVE AND OBJECTIVE BOX
Neurology Stroke Progress Note    INTERVAL HPI/OVERNIGHT EVENTS:  Patient seen and examined. She and son both state she is feeling well, no more numbness. No events overnight, patient was able to sleep. Patient and son would like to know 1)what results of TTE are, 2) when she can ambulate again especially for bathroom, and 3) when she will be able to travel again - either in a car or on a plane.     MEDICATIONS  (STANDING):  acetaZOLAMIDE  IVPB 1000 milliGRAM(s) IV Intermittent once  midodrine 5 milliGRAM(s) Oral every 8 hours  polyethylene glycol 3350 17 Gram(s) Oral daily  potassium chloride    Tablet ER 20 milliEquivalent(s) Oral once    MEDICATIONS  (PRN):      Allergies    No Known Allergies    Intolerances        ROS: As per HPI, otherwise negative    Vital Signs Last 24 Hrs  T(C): 36.8 (01 Jun 2022 06:03), Max: 37.1 (31 May 2022 18:33)  T(F): 98.2 (01 Jun 2022 06:03), Max: 98.7 (31 May 2022 18:33)  HR: 67 (01 Jun 2022 09:00) (52 - 73)  BP: 149/72 (01 Jun 2022 09:00) (116/58 - 204/88)  BP(mean): 103 (01 Jun 2022 09:00) (83 - 126)  RR: 19 (01 Jun 2022 09:00) (13 - 26)  SpO2: 99% (01 Jun 2022 09:00) (95% - 100%)    Physical exam:  General: awake and alert, sitting comfortably, no acute distress    Neurologic:  Mental status: awake, alert, oriented to person, place, and time. Speech is fluent, able to name objects. Follows commands. Attention/concentration intact. No dysarthria, no aphasia.  Cranial nerves:   II: visual fields are full to confrontation. pupils equally round and reactive to light, slight right gaze preference  III, IV, VI: EOMI without nystagmus  V:  V1-V3 sensation intact,   VII: no facial droop, face is symmetric with normal eye closure and smile  VIII: hearing is intact to finger rub  IX, X: Uvula is midline and soft palate rises symmetrically  XI: head turning and shoulder shrug are intact.  XII: tongue midline  Motor: Normal bulk and tone, strength 5/5 in b/l UE and LE,  strength 5/5. No pronator drift  Sensation: intact to light touch. No neglect.  Coordination: No dysmetria on finger-to-nose and heel-to-shin  Reflexes: downgoing toes bilaterally  Gait: unable to assess        LABS:                        14.6   5.52  )-----------( 319      ( 01 Jun 2022 05:30 )             44.5     06-01    141  |  106  |  13  ----------------------------<  110<H>  3.8   |  24  |  0.91    Ca    9.7      01 Jun 2022 05:30  Phos  3.5     06-01  Mg     2.3     06-01    TPro  7.5  /  Alb  4.0  /  TBili  0.8  /  DBili  x   /  AST  17  /  ALT  13  /  AlkPhos  93  06-01    PT/INR - ( 01 Jun 2022 05:30 )   PT: 13.7 sec;   INR: 1.15          PTT - ( 01 Jun 2022 05:30 )  PTT:65.3 sec      RADIOLOGY & ADDITIONAL TESTS:       Neurology Stroke Progress Note    INTERVAL HPI/OVERNIGHT EVENTS:  Patient seen and examined. She and son both state she is feeling well, no acute neurological complaints. Going for SPECT and LALO today. BP overnight much better. Levo d/c.      MEDICATIONS  (STANDING):  acetaZOLAMIDE  IVPB 1000 milliGRAM(s) IV Intermittent once  midodrine 5 milliGRAM(s) Oral every 8 hours  polyethylene glycol 3350 17 Gram(s) Oral daily  potassium chloride    Tablet ER 20 milliEquivalent(s) Oral once    MEDICATIONS  (PRN):      Allergies    No Known Allergies    Intolerances        ROS: As per HPI, otherwise negative    Vital Signs Last 24 Hrs  T(C): 36.8 (01 Jun 2022 06:03), Max: 37.1 (31 May 2022 18:33)  T(F): 98.2 (01 Jun 2022 06:03), Max: 98.7 (31 May 2022 18:33)  HR: 67 (01 Jun 2022 09:00) (52 - 73)  BP: 149/72 (01 Jun 2022 09:00) (116/58 - 204/88)  BP(mean): 103 (01 Jun 2022 09:00) (83 - 126)  RR: 19 (01 Jun 2022 09:00) (13 - 26)  SpO2: 99% (01 Jun 2022 09:00) (95% - 100%)    Physical exam:  General: awake and alert, sitting comfortably, no acute distress    Neurologic:  Mental status: awake, alert, oriented to person, place, and time. Speech is fluent, able to name objects. Follows commands. Attention/concentration intact. No dysarthria  Cranial nerves:   II: visual fields are full to confrontation. pupils equally round and reactive to light  III, IV, VI: EOMI without nystagmus  V:  V1-V3 sensation intact  VII: face is symmetric with normal eye closure and smile  VIII: hearing is intact to finger rub  IX, X: Uvula is midline and soft palate rises symmetrically  XI: head turning and shoulder shrug are intact.  XII: tongue midline  Motor: Normal bulk and tone. No pronator drift b/L. LUE - mild weakness of bicep strength testing. Strength RUE 5/5, bilateral lower extremities 5/5.  Sensation: intact to light touch. No neglect.  Coordination: No dysmetria on finger-to-nose and heel-to-shin  Reflexes: downgoing toes bilaterally  Gait: deferred        LABS:                        14.6   5.52  )-----------( 319      ( 01 Jun 2022 05:30 )             44.5     06-01    141  |  106  |  13  ----------------------------<  110<H>  3.8   |  24  |  0.91    Ca    9.7      01 Jun 2022 05:30  Phos  3.5     06-01  Mg     2.3     06-01    TPro  7.5  /  Alb  4.0  /  TBili  0.8  /  DBili  x   /  AST  17  /  ALT  13  /  AlkPhos  93  06-01    PT/INR - ( 01 Jun 2022 05:30 )   PT: 13.7 sec;   INR: 1.15          PTT - ( 01 Jun 2022 05:30 )  PTT:65.3 sec      RADIOLOGY & ADDITIONAL TESTS:    CTH 5/29: no acute abnormality  CTP: IMPRESSION:  1. Moderate size area of right MCA distribution acute ischemia in the  distribution of the M2 branch occlusions.  2. No CT perfusion evidence of acute infarction.    COMMENTS:  Per perfusion analysis software, Tmax &gt; 6 sec is ischemic tissue   volume. CBF &lt;  30% is infarct core volume. Tmax &gt; 10 sec is poor collateral flow or   severe  delayed perfusion volume.    CTA: IMPRESSION:  1. Proximal occlusion of two posterior right M2 branches consistent with  thrombosis. Paucity of flow in the posterior right MCA distal to this   level.  2. Otherwise patent intracranial arterial system.    CTH: 5/30: Interval development of a small focal hypodensity in the right insula   suspicious for acute infarction. No mass effect or hemorrhagic   transformation.   Neurology Stroke Progress Note    7 EA to 7 LA step down note    Hospital course: 71y Female with no past medical history, not on any meds, just got off a long flight from [place] this past Wednesday presents with left sided hemiparesis. LKW 2:30 PM 5/29 when she was with son and was found with left sided weakness around 4 pm. On immediate presentation to the ED, NIHSS 4 which improved to NIHSS 0 after CTH scans. CTH with no hemorrhage. CTP with elevated Tmax in the R M2 region. CTA head and neck with proximal occlusion of two posterior right M2. Patient no longer a tpa candidate due to return to baseline. Discussed case with neurovascular. Patient not a thrombectomy candidate. Originally admitted to ICU for close neuro monitoring and possible intervention if she were to neurologically change and IV medication treatment to treat occlusion. Patient was started on levophed for better BP control, then weaned off and started on medodrine. Patient now stable for stepdown to 7 [facility] for further stroke workup.    INTERVAL HPI/OVERNIGHT EVENTS:  Patient seen and examined. She and son both state she is feeling well, no acute neurological complaints. Going for SPECT and LALO today. BP overnight much better. Levo d/c.      MEDICATIONS  (STANDING):  acetaZOLAMIDE  IVPB 1000 milliGRAM(s) IV Intermittent once  midodrine 5 milliGRAM(s) Oral every 8 hours  polyethylene glycol 3350 17 Gram(s) Oral daily  potassium chloride    Tablet ER 20 milliEquivalent(s) Oral once    MEDICATIONS  (PRN):      Allergies    No Known Allergies    Intolerances        ROS: As per HPI, otherwise negative    Vital Signs Last 24 Hrs  T(C): 36.8 (01 Jun 2022 06:03), Max: 37.1 (31 May 2022 18:33)  T(F): 98.2 (01 Jun 2022 06:03), Max: 98.7 (31 May 2022 18:33)  HR: 67 (01 Jun 2022 09:00) (52 - 73)  BP: 149/72 (01 Jun 2022 09:00) (116/58 - 204/88)  BP(mean): 103 (01 Jun 2022 09:00) (83 - 126)  RR: 19 (01 Jun 2022 09:00) (13 - 26)  SpO2: 99% (01 Jun 2022 09:00) (95% - 100%)    Physical exam:  General: awake and alert, sitting comfortably, no acute distress    Neurologic:  Mental status: awake, alert, oriented to person, place, and time. Speech is fluent, able to name objects. Follows commands. Attention/concentration intact. No dysarthria  Cranial nerves:   II: visual fields are full to confrontation. pupils equally round and reactive to light  III, IV, VI: EOMI without nystagmus  V:  V1-V3 sensation intact  VII: face is symmetric with normal eye closure and smile  VIII: hearing is intact to finger rub  IX, X: Uvula is midline and soft palate rises symmetrically  XI: head turning and shoulder shrug are intact.  XII: tongue midline  Motor: Normal bulk and tone. No pronator drift b/L. LUE - mild weakness of bicep strength testing. Strength RUE 5/5, bilateral lower extremities 5/5.  Sensation: intact to light touch. No neglect.  Coordination: No dysmetria on finger-to-nose and heel-to-shin  Reflexes: downgoing toes bilaterally  Gait: deferred        LABS:                        14.6   5.52  )-----------( 319      ( 01 Jun 2022 05:30 )             44.5     06-01    141  |  106  |  13  ----------------------------<  110<H>  3.8   |  24  |  0.91    Ca    9.7      01 Jun 2022 05:30  Phos  3.5     06-01  Mg     2.3     06-01    TPro  7.5  /  Alb  4.0  /  TBili  0.8  /  DBili  x   /  AST  17  /  ALT  13  /  AlkPhos  93  06-01    PT/INR - ( 01 Jun 2022 05:30 )   PT: 13.7 sec;   INR: 1.15          PTT - ( 01 Jun 2022 05:30 )  PTT:65.3 sec      RADIOLOGY & ADDITIONAL TESTS:    CTH 5/29: no acute abnormality  CTP: IMPRESSION:  1. Moderate size area of right MCA distribution acute ischemia in the  distribution of the M2 branch occlusions.  2. No CT perfusion evidence of acute infarction.    COMMENTS:  Per perfusion analysis software, Tmax &gt; 6 sec is ischemic tissue   volume. CBF &lt;  30% is infarct core volume. Tmax &gt; 10 sec is poor collateral flow or   severe  delayed perfusion volume.    CTA: IMPRESSION:  1. Proximal occlusion of two posterior right M2 branches consistent with  thrombosis. Paucity of flow in the posterior right MCA distal to this   level.  2. Otherwise patent intracranial arterial system.    CTH: 5/30: Interval development of a small focal hypodensity in the right insula   suspicious for acute infarction. No mass effect or hemorrhagic   transformation.   Neurology Stroke Progress Note    7 EA to 7 LA step down note    Hospital course: 71y Female with no past medical history, not on any meds, just got off a long flight from [place] this past Wednesday presents with left sided hemiparesis. LKW 2:30 PM 5/29 when she was with son and was found with left sided weakness around 4 pm. On immediate presentation to the ED, NIHSS 4 which improved to NIHSS 0 after CTH scans. CTH with no hemorrhage. CTP with elevated Tmax in the R M2 region. CTA head and neck with proximal occlusion of two posterior right M2. Patient no longer a tpa candidate due to return to baseline. Discussed case with neurovascular. Patient not a thrombectomy candidate. Originally admitted to ICU for close neuro monitoring and possible intervention if she were to neurologically change and IV medication treatment to treat occlusion. Patient was started on levophed for better BP control, then weaned off and started on medodrine. Patient now stable for stepdown to 7 [facility] for further stroke workup.    INTERVAL HPI/OVERNIGHT EVENTS:  Patient seen and examined. She and son both state she is feeling well, no acute neurological complaints. Going for SPECT today. BP overnight much better. Levo d/c.      MEDICATIONS  (STANDING):  acetaZOLAMIDE  IVPB 1000 milliGRAM(s) IV Intermittent once  midodrine 5 milliGRAM(s) Oral every 8 hours  polyethylene glycol 3350 17 Gram(s) Oral daily  potassium chloride    Tablet ER 20 milliEquivalent(s) Oral once    MEDICATIONS  (PRN):      Allergies    No Known Allergies    Intolerances        ROS: As per HPI, otherwise negative    Vital Signs Last 24 Hrs  T(C): 36.8 (01 Jun 2022 06:03), Max: 37.1 (31 May 2022 18:33)  T(F): 98.2 (01 Jun 2022 06:03), Max: 98.7 (31 May 2022 18:33)  HR: 67 (01 Jun 2022 09:00) (52 - 73)  BP: 149/72 (01 Jun 2022 09:00) (116/58 - 204/88)  BP(mean): 103 (01 Jun 2022 09:00) (83 - 126)  RR: 19 (01 Jun 2022 09:00) (13 - 26)  SpO2: 99% (01 Jun 2022 09:00) (95% - 100%)    Physical exam:  General: awake and alert, sitting comfortably, no acute distress    Neurologic:  Mental status: awake, alert, oriented to person, place, and time. Speech is fluent, able to name objects. Follows commands. Attention/concentration intact. No dysarthria  Cranial nerves:   II: visual fields are full to confrontation. pupils equally round and reactive to light  III, IV, VI: EOMI without nystagmus  V:  V1-V3 sensation intact  VII: face is symmetric with normal eye closure and smile  VIII: hearing is intact to finger rub  IX, X: Uvula is midline and soft palate rises symmetrically  XI: head turning and shoulder shrug are intact.  XII: tongue midline  Motor: Normal bulk and tone. No pronator drift b/L. LUE - mild weakness of bicep strength testing. Strength RUE 5/5, bilateral lower extremities 5/5.  Sensation: intact to light touch. No neglect.  Coordination: No dysmetria on finger-to-nose and heel-to-shin  Reflexes: downgoing toes bilaterally  Gait: deferred        LABS:                        14.6   5.52  )-----------( 319      ( 01 Jun 2022 05:30 )             44.5     06-01    141  |  106  |  13  ----------------------------<  110<H>  3.8   |  24  |  0.91    Ca    9.7      01 Jun 2022 05:30  Phos  3.5     06-01  Mg     2.3     06-01    TPro  7.5  /  Alb  4.0  /  TBili  0.8  /  DBili  x   /  AST  17  /  ALT  13  /  AlkPhos  93  06-01    PT/INR - ( 01 Jun 2022 05:30 )   PT: 13.7 sec;   INR: 1.15          PTT - ( 01 Jun 2022 05:30 )  PTT:65.3 sec      RADIOLOGY & ADDITIONAL TESTS:    CTH 5/29: no acute abnormality  CTP: IMPRESSION:  1. Moderate size area of right MCA distribution acute ischemia in the  distribution of the M2 branch occlusions.  2. No CT perfusion evidence of acute infarction.    COMMENTS:  Per perfusion analysis software, Tmax &gt; 6 sec is ischemic tissue   volume. CBF &lt;  30% is infarct core volume. Tmax &gt; 10 sec is poor collateral flow or   severe  delayed perfusion volume.    CTA: IMPRESSION:  1. Proximal occlusion of two posterior right M2 branches consistent with  thrombosis. Paucity of flow in the posterior right MCA distal to this   level.  2. Otherwise patent intracranial arterial system.    CTH: 5/30: Interval development of a small focal hypodensity in the right insula   suspicious for acute infarction. No mass effect or hemorrhagic   transformation.

## 2022-06-01 NOTE — PROGRESS NOTE ADULT - SUBJECTIVE AND OBJECTIVE BOX
OVERNIGHT EVENTS:    SUBJECTIVE:  Patient seen and examined at bedside.    Vital Signs Last 12 Hrs  T(F): 98.2 (06-01-22 @ 06:03), Max: 98.3 (05-31-22 @ 21:57)  HR: 64 (06-01-22 @ 06:00) (52 - 68)  BP: 180/79 (06-01-22 @ 05:00) (145/67 - 204/88)  BP(mean): 113 (06-01-22 @ 05:00) (94 - 126)  RR: 24 (06-01-22 @ 06:00) (14 - 25)  SpO2: 97% (06-01-22 @ 06:00) (95% - 100%)  I&O's Summary    31 May 2022 07:01  -  01 Jun 2022 07:00  --------------------------------------------------------  IN: 174.5 mL / OUT: 1600 mL / NET: -1425.5 mL        PHYSICAL EXAM:  Constitutional: NAD, comfortable in bed.  HEENT: NC/AT, PERRLA, EOMI, no conjunctival pallor or scleral icterus, MMM  Neck: Supple, no JVD  Respiratory: CTA B/L. No w/r/r.   Cardiovascular: RRR, normal S1 and S2, no m/r/g.   Gastrointestinal: +BS, soft NTND, no guarding or rebound tenderness, no palpable masses   Extremities: wwp; no cyanosis, clubbing or edema.   Vascular: Pulses equal and strong throughout.   Neurological: AAOx3, no CN deficits, strength and sensation intact throughout.   Skin: No gross skin abnormalities or rashes        LABS:                        14.6   5.52  )-----------( 319      ( 01 Jun 2022 05:30 )             44.5     06-01    141  |  106  |  13  ----------------------------<  110<H>  3.8   |  24  |  0.91    Ca    9.7      01 Jun 2022 05:30  Phos  3.5     06-01  Mg     2.3     06-01    TPro  7.5  /  Alb  4.0  /  TBili  0.8  /  DBili  x   /  AST  17  /  ALT  13  /  AlkPhos  93  06-01    PT/INR - ( 01 Jun 2022 05:30 )   PT: 13.7 sec;   INR: 1.15          PTT - ( 01 Jun 2022 05:30 )  PTT:65.3 sec        RADIOLOGY & ADDITIONAL TESTS:    MEDICATIONS  (STANDING):  heparin  Infusion 750 Unit(s)/Hr (7.5 mL/Hr) IV Continuous <Continuous>  midodrine 5 milliGRAM(s) Oral every 8 hours  norepinephrine Infusion 0.05 MICROgram(s)/kG/Min (7.82 mL/Hr) IV Continuous <Continuous>  polyethylene glycol 3350 17 Gram(s) Oral daily    MEDICATIONS  (PRN):   OVERNIGHT EVENTS: Levo requirements decreased to 0.03. D/C'd in AM per stroke team.     SUBJECTIVE:  Patient seen and examined at bedside. No acute complaints, denies pain, numbness/tingling, new onset weakness.     Vital Signs Last 12 Hrs  T(F): 98.2 (06-01-22 @ 06:03), Max: 98.3 (05-31-22 @ 21:57)  HR: 64 (06-01-22 @ 06:00) (52 - 68)  BP: 180/79 (06-01-22 @ 05:00) (145/67 - 204/88)  BP(mean): 113 (06-01-22 @ 05:00) (94 - 126)  RR: 24 (06-01-22 @ 06:00) (14 - 25)  SpO2: 97% (06-01-22 @ 06:00) (95% - 100%)  I&O's Summary    31 May 2022 07:01  -  01 Jun 2022 07:00  --------------------------------------------------------  IN: 174.5 mL / OUT: 1600 mL / NET: -1425.5 mL    PHYSICAL EXAM:  Constitutional: NAD, comfortable in bed.  HEENT: NC/AT, PERRLA, EOMI, no conjunctival pallor or scleral icterus, MMM  Neck: Supple, no JVD  Respiratory: CTA B/L. No w/r/r.   Cardiovascular: RRR, normal S1 and S2, no m/r/g.   Gastrointestinal: +BS, soft NTND, no guarding or rebound tenderness, no palpable masses   Extremities: wwp; no cyanosis, clubbing or edema.   Vascular: Pulses equal and strong throughout.   Neurological: AAOx3, no CN deficits, strength and sensation intact throughout.   Skin: No gross skin abnormalities or rashes    LABS:                        14.6   5.52  )-----------( 319      ( 01 Jun 2022 05:30 )             44.5     06-01    141  |  106  |  13  ----------------------------<  110<H>  3.8   |  24  |  0.91    Ca    9.7      01 Jun 2022 05:30  Phos  3.5     06-01  Mg     2.3     06-01    TPro  7.5  /  Alb  4.0  /  TBili  0.8  /  DBili  x   /  AST  17  /  ALT  13  /  AlkPhos  93  06-01    PT/INR - ( 01 Jun 2022 05:30 )   PT: 13.7 sec;   INR: 1.15          PTT - ( 01 Jun 2022 05:30 )  PTT:65.3 sec        RADIOLOGY & ADDITIONAL TESTS:    MEDICATIONS  (STANDING):  heparin  Infusion 750 Unit(s)/Hr (7.5 mL/Hr) IV Continuous <Continuous>  midodrine 5 milliGRAM(s) Oral every 8 hours  norepinephrine Infusion 0.05 MICROgram(s)/kG/Min (7.82 mL/Hr) IV Continuous <Continuous>  polyethylene glycol 3350 17 Gram(s) Oral daily    MEDICATIONS  (PRN):   OVERNIGHT EVENTS: Levo requirements decreased to 0.03. D/C'd in AM per stroke team. Pendng LALO and MR.    SUBJECTIVE:  Patient seen and examined at bedside. No acute complaints, denies pain, numbness/tingling, new onset weakness.     Vital Signs Last 12 Hrs  T(F): 98.2 (06-01-22 @ 06:03), Max: 98.3 (05-31-22 @ 21:57)  HR: 64 (06-01-22 @ 06:00) (52 - 68)  BP: 180/79 (06-01-22 @ 05:00) (145/67 - 204/88)  BP(mean): 113 (06-01-22 @ 05:00) (94 - 126)  RR: 24 (06-01-22 @ 06:00) (14 - 25)  SpO2: 97% (06-01-22 @ 06:00) (95% - 100%)  I&O's Summary    31 May 2022 07:01  -  01 Jun 2022 07:00  --------------------------------------------------------  IN: 174.5 mL / OUT: 1600 mL / NET: -1425.5 mL    PHYSICAL EXAM:  Constitutional: NAD, comfortable in bed.  HEENT: NC/AT, PERRLA, EOMI, no conjunctival pallor or scleral icterus, MMM  Neck: Supple, no JVD  Respiratory: CTA B/L. No w/r/r.   Cardiovascular: RRR, normal S1 and S2, no m/r/g.   Gastrointestinal: +BS, soft NTND, no guarding or rebound tenderness, no palpable masses   Extremities: wwp; no cyanosis, clubbing or edema.   Vascular: Pulses equal and strong throughout.   Neurological: AAOx3, no CN deficits, strength and sensation intact throughout.   Skin: No gross skin abnormalities or rashes    LABS:                        14.6   5.52  )-----------( 319      ( 01 Jun 2022 05:30 )             44.5     06-01    141  |  106  |  13  ----------------------------<  110<H>  3.8   |  24  |  0.91    Ca    9.7      01 Jun 2022 05:30  Phos  3.5     06-01  Mg     2.3     06-01    TPro  7.5  /  Alb  4.0  /  TBili  0.8  /  DBili  x   /  AST  17  /  ALT  13  /  AlkPhos  93  06-01    PT/INR - ( 01 Jun 2022 05:30 )   PT: 13.7 sec;   INR: 1.15          PTT - ( 01 Jun 2022 05:30 )  PTT:65.3 sec        RADIOLOGY & ADDITIONAL TESTS:    MEDICATIONS  (STANDING):  heparin  Infusion 750 Unit(s)/Hr (7.5 mL/Hr) IV Continuous <Continuous>  midodrine 5 milliGRAM(s) Oral every 8 hours  norepinephrine Infusion 0.05 MICROgram(s)/kG/Min (7.82 mL/Hr) IV Continuous <Continuous>  polyethylene glycol 3350 17 Gram(s) Oral daily    MEDICATIONS  (PRN):

## 2022-06-02 LAB
ALBUMIN SERPL ELPH-MCNC: 4.1 G/DL — SIGNIFICANT CHANGE UP (ref 3.3–5)
ALP SERPL-CCNC: 95 U/L — SIGNIFICANT CHANGE UP (ref 40–120)
ALT FLD-CCNC: 21 U/L — SIGNIFICANT CHANGE UP (ref 10–45)
ANION GAP SERPL CALC-SCNC: 11 MMOL/L — SIGNIFICANT CHANGE UP (ref 5–17)
APTT BLD: 51.3 SEC — HIGH (ref 27.5–35.5)
AST SERPL-CCNC: 26 U/L — SIGNIFICANT CHANGE UP (ref 10–40)
AT III ACT/NOR PPP CHRO: 94 % — SIGNIFICANT CHANGE UP (ref 85–135)
B2 GLYCOPROT1 AB SER QL: NEGATIVE — SIGNIFICANT CHANGE UP
BASOPHILS # BLD AUTO: 0.02 K/UL — SIGNIFICANT CHANGE UP (ref 0–0.2)
BASOPHILS NFR BLD AUTO: 0.4 % — SIGNIFICANT CHANGE UP (ref 0–2)
BILIRUB SERPL-MCNC: 0.7 MG/DL — SIGNIFICANT CHANGE UP (ref 0.2–1.2)
BUN SERPL-MCNC: 18 MG/DL — SIGNIFICANT CHANGE UP (ref 7–23)
CALCIUM SERPL-MCNC: 9.8 MG/DL — SIGNIFICANT CHANGE UP (ref 8.4–10.5)
CARDIOLIPIN AB SER-ACNC: NEGATIVE — SIGNIFICANT CHANGE UP
CHLORIDE SERPL-SCNC: 106 MMOL/L — SIGNIFICANT CHANGE UP (ref 96–108)
CO2 SERPL-SCNC: 20 MMOL/L — LOW (ref 22–31)
CONFIRM APTT STACLOT: NEGATIVE — SIGNIFICANT CHANGE UP
CREAT SERPL-MCNC: 1.11 MG/DL — SIGNIFICANT CHANGE UP (ref 0.5–1.3)
DRVVT SCREEN TO CONFIRM RATIO: SIGNIFICANT CHANGE UP
EGFR: 53 ML/MIN/1.73M2 — LOW
EOSINOPHIL # BLD AUTO: 0.1 K/UL — SIGNIFICANT CHANGE UP (ref 0–0.5)
EOSINOPHIL NFR BLD AUTO: 1.8 % — SIGNIFICANT CHANGE UP (ref 0–6)
GLUCOSE SERPL-MCNC: 110 MG/DL — HIGH (ref 70–99)
HCT VFR BLD CALC: 43.2 % — SIGNIFICANT CHANGE UP (ref 34.5–45)
HGB BLD-MCNC: 13.9 G/DL — SIGNIFICANT CHANGE UP (ref 11.5–15.5)
IMM GRANULOCYTES NFR BLD AUTO: 0.4 % — SIGNIFICANT CHANGE UP (ref 0–1.5)
INR BLD: 1.06 — SIGNIFICANT CHANGE UP (ref 0.88–1.16)
LA NT DPL PPP QL: 23.3 SEC — SIGNIFICANT CHANGE UP
LYMPHOCYTES # BLD AUTO: 1.84 K/UL — SIGNIFICANT CHANGE UP (ref 1–3.3)
LYMPHOCYTES # BLD AUTO: 32.5 % — SIGNIFICANT CHANGE UP (ref 13–44)
MAGNESIUM SERPL-MCNC: 2.4 MG/DL — SIGNIFICANT CHANGE UP (ref 1.6–2.6)
MCHC RBC-ENTMCNC: 29.7 PG — SIGNIFICANT CHANGE UP (ref 27–34)
MCHC RBC-ENTMCNC: 32.2 GM/DL — SIGNIFICANT CHANGE UP (ref 32–36)
MCV RBC AUTO: 92.3 FL — SIGNIFICANT CHANGE UP (ref 80–100)
MONOCYTES # BLD AUTO: 0.4 K/UL — SIGNIFICANT CHANGE UP (ref 0–0.9)
MONOCYTES NFR BLD AUTO: 7.1 % — SIGNIFICANT CHANGE UP (ref 2–14)
NEUTROPHILS # BLD AUTO: 3.28 K/UL — SIGNIFICANT CHANGE UP (ref 1.8–7.4)
NEUTROPHILS NFR BLD AUTO: 57.8 % — SIGNIFICANT CHANGE UP (ref 43–77)
NRBC # BLD: 0 /100 WBCS — SIGNIFICANT CHANGE UP (ref 0–0)
PHOSPHATE SERPL-MCNC: 4.3 MG/DL — SIGNIFICANT CHANGE UP (ref 2.5–4.5)
PLATELET # BLD AUTO: 280 K/UL — SIGNIFICANT CHANGE UP (ref 150–400)
POTASSIUM SERPL-MCNC: 3.8 MMOL/L — SIGNIFICANT CHANGE UP (ref 3.5–5.3)
POTASSIUM SERPL-SCNC: 3.8 MMOL/L — SIGNIFICANT CHANGE UP (ref 3.5–5.3)
PROT C ACT/NOR PPP: 116 % — SIGNIFICANT CHANGE UP (ref 74–150)
PROT SERPL-MCNC: 7.5 G/DL — SIGNIFICANT CHANGE UP (ref 6–8.3)
PROTHROM AB SERPL-ACNC: 12.6 SEC — SIGNIFICANT CHANGE UP (ref 10.5–13.4)
RBC # BLD: 4.68 M/UL — SIGNIFICANT CHANGE UP (ref 3.8–5.2)
RBC # FLD: 13.8 % — SIGNIFICANT CHANGE UP (ref 10.3–14.5)
SODIUM SERPL-SCNC: 137 MMOL/L — SIGNIFICANT CHANGE UP (ref 135–145)
WBC # BLD: 5.66 K/UL — SIGNIFICANT CHANGE UP (ref 3.8–10.5)
WBC # FLD AUTO: 5.66 K/UL — SIGNIFICANT CHANGE UP (ref 3.8–10.5)

## 2022-06-02 PROCEDURE — 93312 ECHO TRANSESOPHAGEAL: CPT | Mod: 26

## 2022-06-02 RX ORDER — ATORVASTATIN CALCIUM 80 MG/1
80 TABLET, FILM COATED ORAL DAILY
Refills: 0 | Status: DISCONTINUED | OUTPATIENT
Start: 2022-06-02 | End: 2022-06-09

## 2022-06-02 RX ADMIN — ATORVASTATIN CALCIUM 80 MILLIGRAM(S): 80 TABLET, FILM COATED ORAL at 13:51

## 2022-06-02 RX ADMIN — MIDODRINE HYDROCHLORIDE 10 MILLIGRAM(S): 2.5 TABLET ORAL at 14:30

## 2022-06-02 RX ADMIN — MIDODRINE HYDROCHLORIDE 10 MILLIGRAM(S): 2.5 TABLET ORAL at 21:39

## 2022-06-02 RX ADMIN — MIDODRINE HYDROCHLORIDE 10 MILLIGRAM(S): 2.5 TABLET ORAL at 05:02

## 2022-06-02 RX ADMIN — HEPARIN SODIUM 7.5 UNIT(S)/HR: 5000 INJECTION INTRAVENOUS; SUBCUTANEOUS at 09:06

## 2022-06-02 NOTE — PROGRESS NOTE ADULT - ASSESSMENT
71y Female with no past medical history, not on any meds, just got off a long flight from [place] this past Wednesday presents with left sided hemiparesis. LKW 2:30 PM 5/29 when she was with son and was found with left sided weakness around 4 pm. On immediate presentation to the ED, NIHSS 4 which improved to NIHSS 0 after CTH scans. CTH with no hemorrhage. CTP with elevated Tmax in the R M2 region. CTA head and neck with proximal occlusion of two posterior right M2. Patient no longer a tap candidate due to return to baseline. Discussed case with neurovascular. Patient not a thrombectomy candidate at this time.     Discussed plan with patient and son to admit to ICU for close neuro monitoring and possible intervention if she were to neurologically change and IV medication treatment to treat occlusion. Patient initially did not wish to be admitted to the ICU because she is currently doing well, back at baseline. Educated patient that while she definitely improved, she has a blood clot that will cause her to have a stroke and is a high risk for disabling deficits if the vessel were to occlude or clot to embolize. Also emphasized that she will need blood thinner through the IV and aspirin or any other PO medication would not be enough. In addition, she would still need to be admitted for a stroke work up. Patient discusssed with son and other children regarding admission. Also had concerns of cost (as she is visiting and has no insurance) and questions the necessity of ICU. After extensive conversation with son (Maxx) at bedside and patient, patient is willing to be admitted to ICU for close neuro monitoring for thrombectomy watch.     1) Admitted to MICU for thrombectomy watch i/s/o MCA bifurcation occlusion   - q1 neuro checks to continue at this time   - hept gtt PTT goal 50-70 -- continue monitoring aPTT DAILY in AMs (therapeutic x3)   - will obtain STAT HCT, CTA H/N, CTP if acute change in neuro status   - SBP goal 140-180, start fluids or pressors if need to maintain blood pressure goal.  - SCDs in place   - D/C'd levophed  - c/w midodrine 5mg TID with attempt to wean off levophed  - MR and MR NOVA pending results  - Diet started  - TTE done 5/31: LVH, EF 55-60%. Pending LALO for better visualiazation    4)DVT ppx - SCDs, hep gtt being monitored by aPTT daily 71y Female with no past medical history, not on any meds, just got off a long flight from [place] this past Wednesday presents with left sided hemiparesis. LKW 2:30 PM 5/29 when she was with son and was found with left sided weakness around 4 pm. On immediate presentation to the ED, NIHSS 4 which improved to NIHSS 0 after CTH scans. CTH with no hemorrhage. CTP with elevated Tmax in the R M2 region. CTA head and neck with proximal occlusion of two posterior right M2. Patient no longer a tap candidate due to return to baseline. Discussed case with neurovascular. Patient not a thrombectomy candidate at this time.     Discussed plan with patient and son to admit to ICU for close neuro monitoring and possible intervention if she were to neurologically change and IV medication treatment to treat occlusion. Patient initially did not wish to be admitted to the ICU because she is currently doing well, back at baseline. Educated patient that while she definitely improved, she has a blood clot that will cause her to have a stroke and is a high risk for disabling deficits if the vessel were to occlude or clot to embolize. Also emphasized that she will need blood thinner through the IV and aspirin or any other PO medication would not be enough. In addition, she would still need to be admitted for a stroke work up. Patient discusssed with son and other children regarding admission. Also had concerns of cost (as she is visiting and has no insurance) and questions the necessity of ICU. After extensive conversation with son (Maxx) at bedside and patient, patient is willing to be admitted to ICU for close neuro monitoring for thrombectomy watch.     1) Admitted to MICU for thrombectomy watch i/s/o MCA bifurcation occlusion   - q1 neuro checks to continue at this time   - hept gtt PTT goal 50-70 -- continue monitoring aPTT DAILY in AMs (therapeutic x3)   - will obtain STAT HCT, CTA H/N, CTP if acute change in neuro status   - SBP goal 140-180, start fluids or pressors if need to maintain blood pressure goal.  - SCDs in place   - D/C'd levophed  - started midodrine 10mg TID  - MR and MR NOVA results performed, pending results  - Diet started  - TTE done 5/31: LVH, EF 55-60%. Pending LALO for better visualiazation    4)DVT ppx - SCDs, hep gtt being monitored by aPTT daily

## 2022-06-02 NOTE — PROGRESS NOTE ADULT - ASSESSMENT
per Neurology    70 y/o F with no pmh and recent long flight from [place] on Wednesday presented to [facility] on 5/29 with L hemiparesis, L facial droop and R gaze deviation. Initial NIHSS 4. Initial CTH without acute abnormality. CTP with elevated Tmax in the R M2 region. CTA head and neck with proximal occlusion of two posterior right M2. Patient improved to NIHSS 0 so no TPA was given. Repeat CTH on 5/29 with small hypodensity in right insula suspicious for acute infarct. Patient not a thrombectomy candidate. Admitted to MICU for close neuro monitoring and thrombectomy watch now step down to [address] [facility].    Neuro  #CVA workup  - q4hr stroke neuro checks and vitals  - f/u MRI, MRA NOVA, SPECT  - hept gtt PTT goal 50-70  - Stroke Code HCT Results: no acute abnormality --> Interval development of a small focal hypodensity in the right insula suspicious for acute infarction. No mass effect or hemorrhagic transformation.  - Stroke Code CTA Results: Proximal occlusion of two posterior right M2 branches consistent with thrombosis. Paucity of flow in the posterior right MCA distal to this level.  - Stroke education    Cards  - Goal SBP >120  - c/w medodrine 5mg TID, levo d/c  - TTE with bubble:    1. Normal left ventricular size and systolic function.   2. Left ventricular endocardium is not well visualizaed, unable to   definitively evaluate left ventricular wall motion.   3. Mild symmetric left ventricular hypertrophy.   4. Normal right ventricular size and systolic function.   5. Normal atria.   6. No significant valvular disease.   7. No pericardial effusion.   8. No prior echo is available for comparison.   9. Consider repeat TTE with imaging solution such as Definity to better   enhance the endocardium and evaluate the left ventricular function, if   clinically indicated.    - LDL results: 65  - start atorvastatin 80mg  - LE doppler neg  - may need hypercoagulable panel if rest of work up unremarkable    - f/u LALO (NPO after midght)    Pulm  - call provider if SPO2 < 94%    GI  #Nutrition/Fluids/Electrolytes   - replete K<4 and Mg <2  - Diet: NPO for LALO tonight  - IVF: n/a    Renal  - 13/0.91    Endocrine  - A1C results: 5.1  - TSH results: 1.04    DVT Prophylaxis  - SCDs, hep gtt being monitored by aPTT daily    Dispo: stepdown to [facility]

## 2022-06-02 NOTE — PROGRESS NOTE ADULT - SUBJECTIVE AND OBJECTIVE BOX
Physical Medicine and Rehabilitation Progress Note:    Patient is a 71y old  Female who presents with a chief complaint of L sided hemiparesis, thrombectomy watch (02 Jun 2022 07:15)      HPI:  HPI: 71y Female with no past medical history, not on any meds, just got off a long flight from [place] this past Wednesday presents with left sided hemiparesis. LKW 2:30 PM today when she was with son (Axel [phone], at bedside). Then around 4pm, family found patient with profound left sided weakness. EMS was called. BP with /100. Per EMS, no movement in the left arm nor leg, left facial droop, and right gaze preference and could not overcome.     On presentation to ED, 163/83 with improving exam. AOx3, answering questions and following commands. Right gaze preference and can easily overcome when attention brought to the left side. LUE with drift, does not hit bed. Minor left facial droop (although son says patient's face at baseline), extinguishes to DST on the left. NIHSS 4. CTH with no hemorrhage. CTP with elevated Tmax in the R M2 region. CTA head and neck with proximal occlusion of two posterior right M2. Patient is a tpa candidate as CTH is negative for hemorrhage, within window and although improving exam, with disabling deficits. While mixing tpa, patient exam improved to an NIHSS 0. Drift no longer present, gaze midline and attending bilaterally without preference, sensation intact b/l without extinguishing on DST. Decision made to not give tpa due to return to baseline. Endovascular neurosurgery consulted regarding RM2 occlusion. Patient currently not a thrombectomy candidate due to NIHSS 0 with no focal deficits.     Patient denies CP, SOB, prior episodes of weakness and numbness. She has b/l LE flushing and "heat" occasionally, but never weakness, numbness nor paresthesia.     T(C): --  HR: 59 (05-29-22 @ 17:30) (59 - 59)  BP: 163/83 (05-29-22 @ 17:30) (163/83 - 163/83)  RR: 18 (05-29-22 @ 17:30) (18 - 18)  SpO2: 98% (05-29-22 @ 17:30) (98% - 98%)    PAST MEDICAL & SURGICAL HISTORY:      FAMILY HISTORY:      SOCIAL HISTORY:   Patient currently visiting from [place], staying with son.   Smoking status:  Drinking:  Drug Use:     ROS:   Constitutional: No fever, weight loss or fatigue  Eyes: No eye pain, visual disturbances, or discharge  ENMT:  No difficulty hearing, tinnitus; No sinus or throat pain  Neck: No pain or stiffness  Respiratory: No cough, wheezing, chills or hemoptysis  Cardiovascular: No chest pain, palpitations, shortness of breath, or leg swelling  Gastrointestinal: No abdominal pain. No nausea, vomiting or hematemesis; No diarrhea or constipation. Nohematochezia.  Genitourinary: No dysuria, frequency, hematuria or incontinence  Neurological: As per HPI    MEDICATIONS  (STANDING):  dextrose 50% Injectable 25 milliLiter(s) IV Push Once    MEDICATIONS  (PRN):    Allergies    No Known Allergies    Intolerances      Vital Signs Last 24 Hrs  T(C): --  T(F): --  HR: 59 (29 May 2022 17:30) (59 - 59)  BP: 163/83 (29 May 2022 17:30) (163/83 - 163/83)  BP(mean): --  RR: 18 (29 May 2022 17:30) (18 - 18)  SpO2: 98% (29 May 2022 17:30) (98% - 98%)    Physical exam:  Constitutional: No acute distress, conversant  Eyes: Anicteric sclerae, moist conjunctivae, see below for CNs  Neck: trachea midline, FROM, supple, no thyromegaly or lymphadenopathy  Cardiovascular: Some PVCs on tele, no afib.   Pulmonary: No increased work of breathing. No use of accessory muscles  GI: Abdomen soft, non-distended, non-tender  Extremities: no edema    Neurologic: on immediate presentation to the ED  -Mental status: Awake, alert, oriented to person, place, and time. Speech is fluent with intact naming, repetition, and comprehension, no dysarthria. Recent and remote memory intact. Follows commands. Attention/concentration intact. Fund of knowledge appropriate.  -Cranial nerves:   II: Visual fields are full to confrontation.  III, IV, VI: Extraocular movements are intact without nystagmus. RIght gaze preference but can cross and bury to the left. Can attend to the left easily when attention brought to that side. Pupils equally round and reactive to light  V:  Facial sensation V1-V3 equal and intact   VII: Mild left facial droop   VIII: Hearing is grossly intact.   Motor: Normal bulk and tone. LUE drift, does not hit bed. All other extremities antigravity without drift.   Sensation: Intact to light touch bilaterally. Extinguishes on the left with DST  Coordination: No dysmetria on finger-to-nose bilaterally  NIHSS: 4      Neurologic: after CT scans, in resus room  -Mental status: Awake, alert, oriented to person, place, and time. Speech is fluent with intact naming, repetition, and comprehension, no dysarthria. Recent and remote memory intact. Follows commands. Attention/concentration intact. Fund of knowledge appropriate.  -Cranial nerves:   II: Visual fields are full to confrontation.  III, IV, VI: Extraocular movements are intact without nystagmus. Pupils equally round and reactive to light  V:  Facial sensation V1-V3 equal and intact   VII: Face is symmetric with normal eye closure and smile  VIII: Hearing is grossly intact bilaterally  XII: Tongue protrudes midline  Motor: Normal bulk and tone. No pronator drift. Strength bilateral upper extremity 5/5, bilateral lower extremities 5/5.  Sensation: Intact to light touch bilaterally. No neglect or extinction on double simultaneous testing.  Coordination: No dysmetria on finger-to-nose bilaterally  NIHSS: 0        Fingerstick Blood Glucose: CAPILLARY BLOOD GLUCOSE  78 (29 May 2022 18:13)      POCT Blood Glucose.: 78 mg/dL (29 May 2022 17:31)    LABS:                        14.3   6.23  )-----------( 345      ( 29 May 2022 17:36 )             44.1     05-29    142  |  105  |  17  ----------------------------<  85  4.0   |  27  |  1.17    Ca    9.8      29 May 2022 17:36    TPro  7.8  /  Alb  4.5  /  TBili  0.5  /  DBili  x   /  AST  24  /  ALT  17  /  AlkPhos  99  05-29    PT/INR - ( 29 May 2022 17:36 )   PT: 13.1 sec;   INR: 1.10          PTT - ( 29 May 2022 17:36 )  PTT:29.1 sec  CARDIAC MARKERS ( 29 May 2022 17:36 )  x     / 0.01 ng/mL / x     / x     / x              RADIOLOGY & ADDITIONAL STUDIES:    < from: CT Brain Stroke Protocol (05.29.22 @ 17:43) >  IMPRESSION:  1. No acute intracranial abnormality.  2. ASPECTS (Alberta Stroke Program Early CT Score) is 10.    < end of copied text >  < from: CT Perfusion w/ Maps w/ IV Cont (05.29.22 @ 17:43) >  IMPRESSION:  1. Moderate size area of right MCA distribution acute ischemia in the  distribution of the M2 branch occlusions.  2. No CT perfusion evidence of acute infarction.    < end of copied text >  < from: CT Angio Head w/ IV Cont (05.29.22 @ 17:43) >  IMPRESSION:  1. Proximal occlusion of two posterior right M2 branches consistent with  thrombosis. Paucity of flow in the posterior right MCA distal to this   level.  2. Otherwise patent intracranial arterial system.    < end of copied text >  < from: CT Angio Neck w/ IV Cont (05.29.22 @ 17:43) >  IMPRESSION:  No stenosis or occlusion.    < end of copied text >    -----------------------------------------------------------------------------------------------------------------  IV-tPA (Y/N):    N                              Bolus time:    Alteplase Dose Verification w/ RN:  Reason IV-tPA not given: rapidly improving exam, back to baseline (29 May 2022 22:54)                            13.9   5.66  )-----------( 280      ( 02 Jun 2022 05:30 )             43.2       06-02    137  |  106  |  18  ----------------------------<  110<H>  3.8   |  20<L>  |  1.11    Ca    9.8      02 Jun 2022 05:30  Phos  4.3     06-02  Mg     2.4     06-02    TPro  7.5  /  Alb  4.1  /  TBili  0.7  /  DBili  x   /  AST  26  /  ALT  21  /  AlkPhos  95  06-02    Vital Signs Last 24 Hrs  T(C): 36.6 (02 Jun 2022 09:46), Max: 36.7 (02 Jun 2022 01:08)  T(F): 97.9 (02 Jun 2022 09:46), Max: 98 (02 Jun 2022 01:08)  HR: 52 (02 Jun 2022 12:02) (46 - 58)  BP: 147/65 (02 Jun 2022 12:02) (95/51 - 147/67)  BP(mean): 93 (02 Jun 2022 12:02) (71 - 100)  RR: 18 (02 Jun 2022 11:39) (12 - 33)  SpO2: 96% (02 Jun 2022 11:46) (95% - 100%)    MEDICATIONS  (STANDING):  atorvastatin 80 milliGRAM(s) Oral daily  heparin  Infusion 750 Unit(s)/Hr (7.5 mL/Hr) IV Continuous <Continuous>  midodrine 10 milliGRAM(s) Oral every 8 hours  polyethylene glycol 3350 17 Gram(s) Oral daily    MEDICATIONS  (PRN):    Currently Undergoing Physical/ Occupational Therapy at bedside.    PT/OT Functional Status Assessment:      Therapeutic Interventions      Bed Mobility  Bed Mobility Training Scooting: supervision  Bed Mobility Training Sit-to-Supine: supervision;  supervision  Bed Mobility Training Supine-to-Sit: supervision;  supervision  Bed Mobility Training Limitations: decreased ability to use arms for pushing/pulling;  decreased ability to use legs for bridging/pushing;  impaired ability to control trunk for mobility;  impaired balance    Sit-Stand Transfer Training  Transfer Training Sit-to-Stand Transfer: supervision;  supervision;  full weight-bearing  Transfer Training Stand-to-Sit Transfer: supervision;  supervision;  full weight-bearing  Sit-to-Stand Transfer Training Transfer Safety Analysis: decreased balance;  impaired balance    Toilet Transfer Training  Transfer Training Toilet Transfer: supervision;  supervision;  full weight-bearing   grab bars  Toilet Transfer Training Transfer Safety Analysis: decreased balance;  impaired balance;  grab bars    Therapeutic Exercise  Therapeutic Exercise Detail: Pt ambulated ~60 ft with CGA and HHA. Pt with decreased balance and 1 LOB in bathroom, requiring CGA/Reanna to recover. Pt using IV pole for support.     Grooming Training  Grooming Training Assistance: supervision;  supervision;  supervision for balance. Pt able to perform task with independence.;  impaired balance    Toilet Training  Toilet Training Assistance: independent            PM&R Impression: as above    Current Disposition Plan Recommendations:   d/c home, home PT/OT , family assist

## 2022-06-02 NOTE — PROGRESS NOTE ADULT - SUBJECTIVE AND OBJECTIVE BOX
Neurology Stroke Progress Note    INTERVAL HPI/OVERNIGHT EVENTS:  Patient seen and examined. Midodrine increased from BID to TID to achieve goal blood pressure >140. Pending LALO, MR BENITEZ. Hyper coag panel sent. No complaints.     MEDICATIONS  (STANDING):  atorvastatin 80 milliGRAM(s) Oral daily  heparin  Infusion 750 Unit(s)/Hr (7.5 mL/Hr) IV Continuous <Continuous>  midodrine 10 milliGRAM(s) Oral every 8 hours  polyethylene glycol 3350 17 Gram(s) Oral daily    MEDICATIONS  (PRN):      Allergies    No Known Allergies    Intolerances        Vital Signs Last 24 Hrs  T(C): 36.6 (02 Jun 2022 09:46), Max: 36.7 (02 Jun 2022 01:08)  T(F): 97.9 (02 Jun 2022 09:46), Max: 98 (02 Jun 2022 01:08)  HR: 47 (02 Jun 2022 11:39) (46 - 58)  BP: 133/60 (02 Jun 2022 11:39) (95/51 - 149/71)  BP(mean): 86 (02 Jun 2022 11:39) (71 - 102)  RR: 18 (02 Jun 2022 11:39) (12 - 33)  SpO2: 96% (02 Jun 2022 11:39) (95% - 100%)    Physical exam:  General: No acute distress, awake and alert  Eyes: Anicteric sclerae, moist conjunctivae, see below for CNs  Neck: trachea midline, FROM, supple, no thyromegaly or lymphadenopathy  Cardiovascular: Regular rate and rhythm, no murmurs, rubs, or gallops. No carotid bruits.   Pulmonary: Anterior breath sounds clear bilaterally, no crackles or wheezing. No use of accessory muscles  GI: Abdomen soft, non-distended, non-tender  Extremities: Radial and DP pulses +2, no edema    Neurologic:  -Mental status: Awake, alert, oriented to person, place, and time. Speech is fluent with intact naming, repetition, and comprehension, no dysarthria. Recent and remote memory intact. Follows commands. Attention/concentration intact. Fund of knowledge appropriate.  -Cranial nerves:   II: Visual fields are full to confrontation.  III, IV, VI: Extraocular movements are intact without nystagmus. Pupils equally round and reactive to light  V:  Facial sensation V1-V3 equal and intact   VII: Face is symmetric with normal eye closure and smile  VIII: Hearing is bilaterally intact to finger rub  IX, X: Uvula is midline and soft palate rises symmetrically  XI: Head turning and shoulder shrug are intact.  XII: Tongue protrudes midline  Motor: Normal bulk and tone. No pronator drift. Strength bilateral upper extremity 5/5, bilateral lower extremities 5/5.  Rapid alternating movements intact and symmetric  Sensation: Intact to light touch bilaterally. No neglect or extinction on double simultaneous testing.  Coordination: No dysmetria on finger-to-nose and heel-to-shin bilaterally  Reflexes: Downgoing toes bilaterally   Gait: Narrow gait and steady    LABS:                        13.9   5.66  )-----------( 280      ( 02 Jun 2022 05:30 )             43.2     06-02    137  |  106  |  18  ----------------------------<  110<H>  3.8   |  20<L>  |  1.11    Ca    9.8      02 Jun 2022 05:30  Phos  4.3     06-02  Mg     2.4     06-02    TPro  7.5  /  Alb  4.1  /  TBili  0.7  /  DBili  x   /  AST  26  /  ALT  21  /  AlkPhos  95  06-02    PT/INR - ( 02 Jun 2022 05:30 )   PT: 12.6 sec;   INR: 1.06          PTT - ( 02 Jun 2022 05:30 )  PTT:51.3 sec      RADIOLOGY & ADDITIONAL TESTS:     Neurology Stroke Progress Note    INTERVAL HPI/OVERNIGHT EVENTS:  Patient seen and examined. Midodrine increased from BID to TID to achieve goal blood pressure >140. Pending LALO, MR BENITEZ. Hyper coag panel sent. No complaints.     MEDICATIONS  (STANDING):  atorvastatin 80 milliGRAM(s) Oral daily  heparin  Infusion 750 Unit(s)/Hr (7.5 mL/Hr) IV Continuous <Continuous>  midodrine 10 milliGRAM(s) Oral every 8 hours  polyethylene glycol 3350 17 Gram(s) Oral daily    MEDICATIONS  (PRN):      Allergies    No Known Allergies    Intolerances        Vital Signs Last 24 Hrs  T(C): 36.6 (02 Jun 2022 09:46), Max: 36.7 (02 Jun 2022 01:08)  T(F): 97.9 (02 Jun 2022 09:46), Max: 98 (02 Jun 2022 01:08)  HR: 47 (02 Jun 2022 11:39) (46 - 58)  BP: 133/60 (02 Jun 2022 11:39) (95/51 - 149/71)  BP(mean): 86 (02 Jun 2022 11:39) (71 - 102)  RR: 18 (02 Jun 2022 11:39) (12 - 33)  SpO2: 96% (02 Jun 2022 11:39) (95% - 100%)    Physical exam:  General: No acute distress, awake and alert  Eyes: Anicteric sclerae, moist conjunctivae, see below for CNs  Neck: trachea midline, FROM, supple, no thyromegaly or lymphadenopathy  Cardiovascular: Regular rate and rhythm on tele  Pulmonary: No increased work of breathing. No use of accessory muscles  GI: Abdomen soft, non-distended, non-tender  Extremities: no edema    Neurologic:  -Mental status: Awake, alert, oriented to person, place, and time. Speech is fluent with intact naming, repetition, and comprehension, no dysarthria. Recent and remote memory intact. Follows commands. Attention/concentration intact. Fund of knowledge appropriate.  -Cranial nerves:   II: Visual fields are full to confrontation.  III, IV, VI: Extraocular movements are intact without nystagmus. Pupils equally round and reactive to light  V:  Facial sensation V1-V3 equal and intact   VII: Face is symmetric with normal eye closure and smile  VIII: Hearing is grossly intact bilaterally   Motor: Normal bulk and tone. No pronator drift. Strength bilateral upper extremity 5/5, bilateral lower extremities 5/5.  Sensation: Intact to light touch bilaterally. No neglect or extinction on double simultaneous testing.  Coordination: No dysmetria on finger-to-nose and heel-to-shin bilaterally      LABS:                        13.9   5.66  )-----------( 280      ( 02 Jun 2022 05:30 )             43.2     06-02    137  |  106  |  18  ----------------------------<  110<H>  3.8   |  20<L>  |  1.11    Ca    9.8      02 Jun 2022 05:30  Phos  4.3     06-02  Mg     2.4     06-02    TPro  7.5  /  Alb  4.1  /  TBili  0.7  /  DBili  x   /  AST  26  /  ALT  21  /  AlkPhos  95  06-02    PT/INR - ( 02 Jun 2022 05:30 )   PT: 12.6 sec;   INR: 1.06          PTT - ( 02 Jun 2022 05:30 )  PTT:51.3 sec      RADIOLOGY & ADDITIONAL TESTS:

## 2022-06-02 NOTE — PROGRESS NOTE ADULT - ASSESSMENT
72 y/o F with no pmh and recent long flight from [place] ([facility]) on Wednesday presented to [facility] on 5/29 with L hemiparesis, L facial droop and R gaze deviation. Initial NIHSS 4. Initial CTH without acute abnormality. CTP with elevated Tmax in the R M2 region. CTA head and neck with proximal occlusion of two posterior right M2. Patient improved to NIHSS 0 so no TPA was given. Repeat CTH on 5/29 with small hypodensity in right insula suspicious for acute infarct. Patient not a thrombectomy candidate.     Neuro  #CVA workup  - q4hr stroke neuro checks and vitals  - f/u MRI, MRA NOVA  - Spect: reversible defect in RM2. Slight decreased activity in L cerebral hemisphere due to relative steal.   - hept gtt PTT goal 50-70  - Stroke Code HCT Results: no acute abnormality --> Interval development of a small focal hypodensity in the right insula suspicious for acute infarction. No mass effect or hemorrhagic transformation.  - Stroke Code CTA Results: Proximal occlusion of two posterior right M2 branches consistent with thrombosis. Paucity of flow in the posterior right MCA distal to this level.  - Stroke education    Cards  - Goal SBP >120  - c/w midodrine 10mg TID  - TTE: no PFO, EF 65%  - TTE with bubble:   - LDL results: 65  - c/w atorvastatin 80mg  - LE doppler neg  - hypercoag panel sent, f/u results    Pulm  - call provider if SPO2 < 94%    GI  #Nutrition/Fluids/Electrolytes   - replete K<4 and Mg <2  - Diet: DASH  - IVF: n/a    Renal  - 13/0.91    Endocrine  - A1C results: 5.1  - TSH results: 1.04    DVT Prophylaxis  - SCDs, hep gtt being monitored by aPTT daily    Dispo: AR     Discussed daily hospital plans and goals with patient and family at bedside.     Discussed with Neurology Attending Dr. Christianson     70 y/o F with no pmh and recent long flight from [place] ([facility]) on Wednesday presented to [facility] on 5/29 with L hemiparesis, L facial droop and R gaze deviation. Initial NIHSS 4. Initial CTH without acute abnormality. CTP with elevated Tmax in the R M2 region. CTA head and neck with proximal occlusion of two posterior right M2. Patient improved to NIHSS 0 so no TPA was given. Repeat CTH on 5/29 with small hypodensity in right insula suspicious for acute infarct. Patient not a thrombectomy candidate.     Neuro  #CVA workup  - q4hr stroke neuro checks and vitals  - f/u MRI, MRA NOVA  - Spect: reversible defect in RM2. Slight decreased activity in L cerebral hemisphere due to relative steal.   - hept gtt PTT goal 50-70  - Stroke Code HCT Results: no acute abnormality --> Interval development of a small focal hypodensity in the right insula suspicious for acute infarction. No mass effect or hemorrhagic transformation.  - Stroke Code CTA Results: Proximal occlusion of two posterior right M2 branches consistent with thrombosis. Paucity of flow in the posterior right MCA distal to this level.  - Stroke education    Cards  - Goal SBP >120  - c/w midodrine 10mg TID  - f/u orthostatics  - TTE: no PFO, EF 65%  - TTE with bubble:   - LDL results: 65  - c/w atorvastatin 80mg  - LE doppler neg  - hypercoag panel sent, f/u results    Pulm  - call provider if SPO2 < 94%    GI  #Nutrition/Fluids/Electrolytes   - replete K<4 and Mg <2  - Diet: DASH  - IVF: n/a    Renal  - 13/0.91    Endocrine  - A1C results: 5.1  - TSH results: 1.04    DVT Prophylaxis  - SCDs, hep gtt being monitored by aPTT daily    Dispo: AR     Discussed daily hospital plans and goals with patient and family at bedside.     Discussed with Neurology Attending Dr. Christianson     70 y/o F with no pmh and recent long flight from [place] ([facility]) on Wednesday presented to [facility] on 5/29 with L hemiparesis, L facial droop and R gaze deviation. Initial NIHSS 4. Initial CTH without acute abnormality. CTP with elevated Tmax in the R M2 region. CTA head and neck with proximal occlusion of two posterior right M2. Patient improved to NIHSS 0 so no TPA was given. Repeat CTH on 5/29 with small hypodensity in right insula suspicious for acute infarct. Patient not a thrombectomy candidate.     Neuro  #CVA workup  - q4hr stroke neuro checks and vitals  - f/u MRI, MRA NOVA  - Spect: reversible defect in RM2. Slight decreased activity in L cerebral hemisphere due to relative steal.   - hept gtt PTT goal 50-70  - Stroke Code HCT Results: no acute abnormality --> Interval development of a small focal hypodensity in the right insula suspicious for acute infarction. No mass effect or hemorrhagic transformation.  - Stroke Code CTA Results: Proximal occlusion of two posterior right M2 branches consistent with thrombosis. Paucity of flow in the posterior right MCA distal to this level.  - Stroke education    Cards  - Goal SBP >120  - c/w midodrine 10mg TID  - orthostatics +: 126/56 sitting, 90/70 standing, will need to increase midodrine tomorrow   - TTE: no PFO, EF 65%  - LALO: no LA thrombus  - LDL results: 65  - c/w atorvastatin 80mg  - LE doppler neg  - hypercoag panel sent, f/u results    Pulm  - call provider if SPO2 < 94%    GI  #Nutrition/Fluids/Electrolytes   - replete K<4 and Mg <2  - Diet: DASH  - IVF: n/a    Renal  - 13/0.91    Endocrine  - A1C results: 5.1  - TSH results: 1.04    DVT Prophylaxis  - SCDs, hep gtt being monitored by aPTT daily    Dispo: AR     Discussed daily hospital plans and goals with patient and family at bedside.     Discussed with Neurology Attending Dr. Christianson

## 2022-06-02 NOTE — PROGRESS NOTE ADULT - SUBJECTIVE AND OBJECTIVE BOX
OVERNIGHT EVENTS:    SUBJECTIVE:  Patient seen and examined at bedside.    Vital Signs Last 12 Hrs  T(F): 98 (06-02-22 @ 01:08), Max: 98 (06-02-22 @ 01:08)  HR: 46 (06-02-22 @ 02:00) (46 - 52)  BP: 117/56 (06-02-22 @ 06:00) (95/51 - 123/57)  BP(mean): 81 (06-02-22 @ 06:00) (71 - 82)  RR: 15 (06-02-22 @ 02:00) (15 - 22)  SpO2: 96% (06-02-22 @ 02:00) (96% - 99%)  I&O's Summary    01 Jun 2022 07:01  -  02 Jun 2022 07:00  --------------------------------------------------------  IN: 182.2 mL / OUT: 1200 mL / NET: -1017.8 mL        PHYSICAL EXAM:  Constitutional: NAD, comfortable in bed.  HEENT: NC/AT, PERRLA, EOMI, no conjunctival pallor or scleral icterus, MMM  Neck: Supple, no JVD  Respiratory: CTA B/L. No w/r/r.   Cardiovascular: RRR, normal S1 and S2, no m/r/g.   Gastrointestinal: +BS, soft NTND, no guarding or rebound tenderness, no palpable masses   Extremities: wwp; no cyanosis, clubbing or edema.   Vascular: Pulses equal and strong throughout.   Neurological: AAOx3, no CN deficits, strength and sensation intact throughout.   Skin: No gross skin abnormalities or rashes        LABS:                        13.9   5.66  )-----------( 280      ( 02 Jun 2022 05:30 )             43.2     06-02    137  |  106  |  18  ----------------------------<  110<H>  3.8   |  20<L>  |  1.11    Ca    9.8      02 Jun 2022 05:30  Phos  4.3     06-02  Mg     2.4     06-02    TPro  7.5  /  Alb  4.1  /  TBili  0.7  /  DBili  x   /  AST  26  /  ALT  21  /  AlkPhos  95  06-02    PT/INR - ( 02 Jun 2022 05:30 )   PT: 12.6 sec;   INR: 1.06          PTT - ( 02 Jun 2022 05:30 )  PTT:51.3 sec        RADIOLOGY & ADDITIONAL TESTS:    MEDICATIONS  (STANDING):  heparin  Infusion 750 Unit(s)/Hr (7.5 mL/Hr) IV Continuous <Continuous>  midodrine 10 milliGRAM(s) Oral every 8 hours  polyethylene glycol 3350 17 Gram(s) Oral daily    MEDICATIONS  (PRN):   OVERNIGHT EVENTS: SBP 95s, gave additoinal 5mg midodrine and changed regimen to midodrine 10mg TID - plan to transfer to stroke unit today    SUBJECTIVE:  Patient seen and examined at bedside. Resting comfortably, had BM.    Vital Signs Last 12 Hrs  T(F): 98 (06-02-22 @ 01:08), Max: 98 (06-02-22 @ 01:08)  HR: 46 (06-02-22 @ 02:00) (46 - 52)  BP: 117/56 (06-02-22 @ 06:00) (95/51 - 123/57)  BP(mean): 81 (06-02-22 @ 06:00) (71 - 82)  RR: 15 (06-02-22 @ 02:00) (15 - 22)  SpO2: 96% (06-02-22 @ 02:00) (96% - 99%)  I&O's Summary    01 Jun 2022 07:01  -  02 Jun 2022 07:00  --------------------------------------------------------  IN: 182.2 mL / OUT: 1200 mL / NET: -1017.8 mL      PHYSICAL EXAM:  Constitutional: NAD, comfortable in bed.  HEENT: NC/AT, PERRLA, EOMI, no conjunctival pallor or scleral icterus, MMM  Neck: Supple, no JVD  Respiratory: CTA B/L. No w/r/r.   Cardiovascular: RRR, normal S1 and S2, no m/r/g.   Gastrointestinal: +BS, soft NTND, no guarding or rebound tenderness, no palpable masses   Extremities: wwp; no cyanosis, clubbing or edema.   Vascular: Pulses equal and strong throughout.   Neurological: AAOx3, no CN deficits, strength and sensation intact throughout.   Skin: No gross skin abnormalities or rashes      LABS:                        13.9   5.66  )-----------( 280      ( 02 Jun 2022 05:30 )             43.2     06-02    137  |  106  |  18  ----------------------------<  110<H>  3.8   |  20<L>  |  1.11    Ca    9.8      02 Jun 2022 05:30  Phos  4.3     06-02  Mg     2.4     06-02    TPro  7.5  /  Alb  4.1  /  TBili  0.7  /  DBili  x   /  AST  26  /  ALT  21  /  AlkPhos  95  06-02    PT/INR - ( 02 Jun 2022 05:30 )   PT: 12.6 sec;   INR: 1.06          PTT - ( 02 Jun 2022 05:30 )  PTT:51.3 sec        RADIOLOGY & ADDITIONAL TESTS:    MEDICATIONS  (STANDING):  heparin  Infusion 750 Unit(s)/Hr (7.5 mL/Hr) IV Continuous <Continuous>  midodrine 10 milliGRAM(s) Oral every 8 hours  polyethylene glycol 3350 17 Gram(s) Oral daily    MEDICATIONS  (PRN):

## 2022-06-03 LAB
ANION GAP SERPL CALC-SCNC: 11 MMOL/L — SIGNIFICANT CHANGE UP (ref 5–17)
APCR PPP: 2.96 RATIO — SIGNIFICANT CHANGE UP
APTT BLD: 59.9 SEC — HIGH (ref 27.5–35.5)
BUN SERPL-MCNC: 18 MG/DL — SIGNIFICANT CHANGE UP (ref 7–23)
CALCIUM SERPL-MCNC: 9.7 MG/DL — SIGNIFICANT CHANGE UP (ref 8.4–10.5)
CHLORIDE SERPL-SCNC: 108 MMOL/L — SIGNIFICANT CHANGE UP (ref 96–108)
CO2 SERPL-SCNC: 19 MMOL/L — LOW (ref 22–31)
CREAT SERPL-MCNC: 1.06 MG/DL — SIGNIFICANT CHANGE UP (ref 0.5–1.3)
EGFR: 56 ML/MIN/1.73M2 — LOW
GLUCOSE SERPL-MCNC: 81 MG/DL — SIGNIFICANT CHANGE UP (ref 70–99)
HCT VFR BLD CALC: 44.9 % — SIGNIFICANT CHANGE UP (ref 34.5–45)
HGB BLD-MCNC: 14.7 G/DL — SIGNIFICANT CHANGE UP (ref 11.5–15.5)
INR BLD: 1.17 — HIGH (ref 0.88–1.16)
MAGNESIUM SERPL-MCNC: 2.3 MG/DL — SIGNIFICANT CHANGE UP (ref 1.6–2.6)
MCHC RBC-ENTMCNC: 30.8 PG — SIGNIFICANT CHANGE UP (ref 27–34)
MCHC RBC-ENTMCNC: 32.7 GM/DL — SIGNIFICANT CHANGE UP (ref 32–36)
MCV RBC AUTO: 94.1 FL — SIGNIFICANT CHANGE UP (ref 80–100)
NRBC # BLD: 0 /100 WBCS — SIGNIFICANT CHANGE UP (ref 0–0)
PHOSPHATE SERPL-MCNC: 4.5 MG/DL — SIGNIFICANT CHANGE UP (ref 2.5–4.5)
PLATELET # BLD AUTO: 260 K/UL — SIGNIFICANT CHANGE UP (ref 150–400)
POTASSIUM SERPL-MCNC: 3.9 MMOL/L — SIGNIFICANT CHANGE UP (ref 3.5–5.3)
POTASSIUM SERPL-SCNC: 3.9 MMOL/L — SIGNIFICANT CHANGE UP (ref 3.5–5.3)
PROT S FREE PPP-ACNC: 104 % — SIGNIFICANT CHANGE UP (ref 63–140)
PROTHROM AB SERPL-ACNC: 14 SEC — HIGH (ref 10.5–13.4)
RBC # BLD: 4.77 M/UL — SIGNIFICANT CHANGE UP (ref 3.8–5.2)
RBC # FLD: 14 % — SIGNIFICANT CHANGE UP (ref 10.3–14.5)
SODIUM SERPL-SCNC: 138 MMOL/L — SIGNIFICANT CHANGE UP (ref 135–145)
WBC # BLD: 4.87 K/UL — SIGNIFICANT CHANGE UP (ref 3.8–10.5)
WBC # FLD AUTO: 4.87 K/UL — SIGNIFICANT CHANGE UP (ref 3.8–10.5)

## 2022-06-03 PROCEDURE — 99254 IP/OBS CNSLTJ NEW/EST MOD 60: CPT

## 2022-06-03 PROCEDURE — 99253 IP/OBS CNSLTJ NEW/EST LOW 45: CPT

## 2022-06-03 RX ORDER — ENOXAPARIN SODIUM 100 MG/ML
80 INJECTION SUBCUTANEOUS EVERY 12 HOURS
Refills: 0 | Status: DISCONTINUED | OUTPATIENT
Start: 2022-06-03 | End: 2022-06-08

## 2022-06-03 RX ORDER — ENOXAPARIN SODIUM 100 MG/ML
80 INJECTION SUBCUTANEOUS EVERY 12 HOURS
Refills: 0 | Status: DISCONTINUED | OUTPATIENT
Start: 2022-06-03 | End: 2022-06-03

## 2022-06-03 RX ADMIN — MIDODRINE HYDROCHLORIDE 10 MILLIGRAM(S): 2.5 TABLET ORAL at 12:08

## 2022-06-03 RX ADMIN — MIDODRINE HYDROCHLORIDE 10 MILLIGRAM(S): 2.5 TABLET ORAL at 21:26

## 2022-06-03 RX ADMIN — ENOXAPARIN SODIUM 80 MILLIGRAM(S): 100 INJECTION SUBCUTANEOUS at 14:09

## 2022-06-03 RX ADMIN — ATORVASTATIN CALCIUM 80 MILLIGRAM(S): 80 TABLET, FILM COATED ORAL at 21:26

## 2022-06-03 RX ADMIN — HEPARIN SODIUM 7.5 UNIT(S)/HR: 5000 INJECTION INTRAVENOUS; SUBCUTANEOUS at 08:23

## 2022-06-03 RX ADMIN — MIDODRINE HYDROCHLORIDE 10 MILLIGRAM(S): 2.5 TABLET ORAL at 05:43

## 2022-06-03 NOTE — PROGRESS NOTE ADULT - ASSESSMENT
Assessment and Plan  71y Female    1)Secondary stroke prevention  -Atorvastatin 80    2) Anticoagulation  Therapeutic Lovenox 80 mg q12    2) Stroke risk factors  -

## 2022-06-03 NOTE — CONSULT NOTE ADULT - ASSESSMENT
70yo F from [facility] presenting with R MCA infarct with improvement in symptoms without intervention.   she has no ischemic or congestive symptoms and her echo revealed mild LVH and normal function without valvular disease.    #abnormal ECG, asymptomatic sinus bradycardia  - sinus bradycardia with non specifc T wave abnormalities  - no further workup   - agree with long term cardiac monitoring to assess for Afib

## 2022-06-03 NOTE — PROGRESS NOTE ADULT - SUBJECTIVE AND OBJECTIVE BOX
Neurology Stroke Progress Note    INTERVAL HPI/OVERNIGHT EVENTS:  Patient seen and examined. Patient feeling well this morning, eldest daughter at bedside. Still pending MR NOVA, LALO was completed with no thrombus, EF 60-65%. Cardiology was consulted for asymptomatic bradycardia and EKG changes from admission.     MEDICATIONS  (STANDING):  atorvastatin 80 milliGRAM(s) Oral daily  enoxaparin Injectable 80 milliGRAM(s) SubCutaneous every 12 hours  midodrine 10 milliGRAM(s) Oral every 8 hours  polyethylene glycol 3350 17 Gram(s) Oral daily    MEDICATIONS  (PRN):      Allergies    No Known Allergies    Intolerances        Vital Signs Last 24 Hrs  T(C): 36.8 (03 Jun 2022 13:00), Max: 36.8 (03 Jun 2022 13:00)  T(F): 98.2 (03 Jun 2022 13:00), Max: 98.2 (03 Jun 2022 13:00)  HR: 59 (03 Jun 2022 12:11) (41 - 59)  BP: 122/55 (03 Jun 2022 12:11) (117/59 - 148/65)  BP(mean): 79 (03 Jun 2022 12:11) (79 - 97)  RR: 17 (03 Jun 2022 12:11) (17 - 19)  SpO2: 100% (03 Jun 2022 12:11) (95% - 100%)    Physical exam:  General: No acute distress, awake and alert  Eyes: Anicteric sclerae, moist conjunctivae, see below for CNs  Neck: trachea midline  Cardiovascular: Bradycardic on monitor  Pulmonary: No use of accessory muscles  GI: Abdomen soft, non-distended, non-tender  Extremities: Radial and DP pulses +2, no edema    Neurologic:  -Mental status: Awake, alert, oriented to person, place, and time. Speech is fluent with intact naming, repetition, and comprehension, no dysarthria. Recent and remote memory intact. Follows commands. Attention/concentration intact. Fund of knowledge appropriate.  -Cranial nerves:   II: Visual fields are full to confrontation.  III, IV, VI: Extraocular movements are intact without nystagmus. Pupils equally round and reactive to light  V:  Facial sensation V1-V3 equal and intact   VII: Face is symmetric with normal eye closure and smile  VIII: Hearing is bilaterally intact to finger rub  IX, X: Uvula is midline and soft palate rises symmetrically  XI: Head turning and shoulder shrug are intact.  XII: Tongue protrudes midline  Motor: Normal bulk and tone. No pronator drift. Strength bilateral upper extremity 5/5, bilateral lower extremities 5/5.  Rapid alternating movements intact and symmetric  Sensation: Intact to light touch bilaterally. No neglect or extinction on double simultaneous testing.  Coordination: No dysmetria on finger-to-nose and heel-to-shin bilaterally  Reflexes: Downgoing toes bilaterally   Gait: Narrow gait and steady    LABS:                        14.7   4.87  )-----------( 260      ( 03 Jun 2022 05:21 )             44.9     06-03    138  |  108  |  18  ----------------------------<  81  3.9   |  19<L>  |  1.06    Ca    9.7      03 Jun 2022 05:21  Phos  4.5     06-03  Mg     2.3     06-03    TPro  7.5  /  Alb  4.1  /  TBili  0.7  /  DBili  x   /  AST  26  /  ALT  21  /  AlkPhos  95  06-02    PT/INR - ( 03 Jun 2022 05:21 )   PT: 14.0 sec;   INR: 1.17          PTT - ( 03 Jun 2022 05:21 )  PTT:59.9 sec      RADIOLOGY & ADDITIONAL TESTS:     Neurology Stroke Progress Note    INTERVAL HPI/OVERNIGHT EVENTS:  Patient seen and examined. Patient feeling well this morning, eldest daughter at bedside. Still pending MR NOVA, LALO was completed with no thrombus, EF 60-65%. Cardiology was consulted for asymptomatic bradycardia and EKG changes from admission.     MEDICATIONS  (STANDING):  atorvastatin 80 milliGRAM(s) Oral daily  enoxaparin Injectable 80 milliGRAM(s) SubCutaneous every 12 hours  midodrine 10 milliGRAM(s) Oral every 8 hours  polyethylene glycol 3350 17 Gram(s) Oral daily    MEDICATIONS  (PRN):      Allergies    No Known Allergies    Intolerances      Vital Signs Last 24 Hrs  T(C): 36.8 (03 Jun 2022 13:00), Max: 36.8 (03 Jun 2022 13:00)  T(F): 98.2 (03 Jun 2022 13:00), Max: 98.2 (03 Jun 2022 13:00)  HR: 59 (03 Jun 2022 12:11) (41 - 59)  BP: 122/55 (03 Jun 2022 12:11) (117/59 - 148/65)  BP(mean): 79 (03 Jun 2022 12:11) (79 - 97)  RR: 17 (03 Jun 2022 12:11) (17 - 19)  SpO2: 100% (03 Jun 2022 12:11) (95% - 100%)    Physical exam:  General: No acute distress, awake and alert  Eyes: Anicteric sclerae, moist conjunctivae, see below for CNs  Neck: trachea midline  Cardiovascular: Bradycardic on monitor  Pulmonary: No use of accessory muscles  GI: Abdomen soft, non-distended, non-tender  Extremities: Radial and DP pulses +2, no edema    Neurologic:  -Mental status: Awake, alert, oriented to person, place, and time. Speech is fluent with intact naming, repetition, and comprehension, no dysarthria. Recent and remote memory intact. Follows commands. Attention/concentration intact. Fund of knowledge appropriate.  -Cranial nerves:   II: Visual fields are full to confrontation.  III, IV, VI: Extraocular movements are intact without nystagmus. Pupils equally round and reactive to light  VII: Face is symmetric with normal eye closure and smile  Motor: Normal bulk and tone. No pronator drift. Strength bilateral upper extremity 5/5, bilateral lower extremities 5/5.  Sensation: Intact to light touch bilaterally. No neglect or extinction on double simultaneous testing.  Coordination: No dysmetria on finger-to-nose and heel-to-shin bilaterally    LABS:                        14.7   4.87  )-----------( 260      ( 03 Jun 2022 05:21 )             44.9     06-03    138  |  108  |  18  ----------------------------<  81  3.9   |  19<L>  |  1.06    Ca    9.7      03 Jun 2022 05:21  Phos  4.5     06-03  Mg     2.3     06-03    TPro  7.5  /  Alb  4.1  /  TBili  0.7  /  DBili  x   /  AST  26  /  ALT  21  /  AlkPhos  95  06-02    PT/INR - ( 03 Jun 2022 05:21 )   PT: 14.0 sec;   INR: 1.17          PTT - ( 03 Jun 2022 05:21 )  PTT:59.9 sec      RADIOLOGY & ADDITIONAL TESTS:     Neurology Stroke Progress Note    INTERVAL HPI/OVERNIGHT EVENTS:  Patient seen and examined. Patient feeling well this morning, eldest daughter at bedside. Still pending MR NOVA, LALO was completed with no thrombus, EF 60-65%. Cardiology was consulted for asymptomatic bradycardia and EKG changes from admission.     MEDICATIONS  (STANDING):  atorvastatin 80 milliGRAM(s) Oral daily  enoxaparin Injectable 80 milliGRAM(s) SubCutaneous every 12 hours  midodrine 10 milliGRAM(s) Oral every 8 hours  polyethylene glycol 3350 17 Gram(s) Oral daily    MEDICATIONS  (PRN):      Allergies    No Known Allergies    Intolerances      Vital Signs Last 24 Hrs  T(C): 36.8 (03 Jun 2022 13:00), Max: 36.8 (03 Jun 2022 13:00)  T(F): 98.2 (03 Jun 2022 13:00), Max: 98.2 (03 Jun 2022 13:00)  HR: 59 (03 Jun 2022 12:11) (41 - 59)  BP: 122/55 (03 Jun 2022 12:11) (117/59 - 148/65)  BP(mean): 79 (03 Jun 2022 12:11) (79 - 97)  RR: 17 (03 Jun 2022 12:11) (17 - 19)  SpO2: 100% (03 Jun 2022 12:11) (95% - 100%)    Physical exam:  General: No acute distress, awake and alert  Eyes: Anicteric sclerae, moist conjunctivae, see below for CNs  Neck: trachea midline  Cardiovascular: Bradycardic on monitor  Pulmonary: No use of accessory muscles  GI: Abdomen soft, non-distended, non-tender  Extremities: Radial and DP pulses +2, no edema    Neurologic:  -Mental status: Awake, alert, oriented to person, place, and time. Speech is fluent with intact naming, repetition, and comprehension, no dysarthria. Recent and remote memory intact. Follows commands. Attention/concentration intact. Fund of knowledge appropriate.  -Cranial nerves:   II: Visual fields are full to confrontation.  III, IV, VI: Extraocular movements are intact without nystagmus. Pupils equally round and reactive to light  VII: Face is symmetric with normal eye closure and smile  Motor: Normal bulk and tone. No pronator drift. Strength bilateral upper extremity 5/5, bilateral lower extremities 5/5.  Sensation: Intact to light touch bilaterally. No neglect or extinction on double simultaneous testing.  Coordination: No dysmetria on finger-to-nose and heel-to-shin bilaterally    LABS:                        14.7   4.87  )-----------( 260      ( 03 Jun 2022 05:21 )             44.9     06-03    138  |  108  |  18  ----------------------------<  81  3.9   |  19<L>  |  1.06    Ca    9.7      03 Jun 2022 05:21  Phos  4.5     06-03  Mg     2.3     06-03    TPro  7.5  /  Alb  4.1  /  TBili  0.7  /  DBili  x   /  AST  26  /  ALT  21  /  AlkPhos  95  06-02    PT/INR - ( 03 Jun 2022 05:21 )   PT: 14.0 sec;   INR: 1.17          PTT - ( 03 Jun 2022 05:21 )  PTT:59.9 sec      RADIOLOGY & ADDITIONAL TESTS:    < from: NM SPECT/CT Brain, Single Area Multi Day (06.01.22 @ 12:10) >  Impression:  Reversible defect in the distribution of the right M2   segment corresponding to an abnormality on recent CT angiographyand   perfusion study.  Slightly decreased activity in a portion of the left   cerebral hemisphere may be due to a relative steal, that is, increased   activity in the uninvolved portion of the right cerebral hemisphere   compared to the normal left.    < end of copied text >

## 2022-06-03 NOTE — PROGRESS NOTE ADULT - REASON FOR ADMISSION
R MCA infarct
L sided hemiparesis, thrombectomy watch
L sided hemiparesis, thrombectomy watch
Stroke

## 2022-06-03 NOTE — PROGRESS NOTE ADULT - SUBJECTIVE AND OBJECTIVE BOX
Neurology Stroke Progress Note    INTERVAL HPI/OVERNIGHT EVENTS:  Patient seen and examined.  Overnight she had a few episodes of sinus bradycardia (HR 40-50s), but pressures were maintained in the 140s - cardiology consulted and gave a clearance. He TTE showed no PFO. Her neuro exam was normal. She was able to sit up in the bed, and  place without any dizziness or syncope. Loop recorder requested. NOVA scan pending.     MEDICATIONS  (STANDING):  atorvastatin 80 milliGRAM(s) Oral daily  enoxaparin Injectable 84 milliGRAM(s) SubCutaneous every 12 hours  midodrine 10 milliGRAM(s) Oral every 8 hours  polyethylene glycol 3350 17 Gram(s) Oral daily    MEDICATIONS  (PRN):      Allergies    No Known Allergies    Intolerances        ROS: As per HPI, otherwise negative    Vital Signs Last 24 Hrs  T(C): 36.8 (03 Jun 2022 13:00), Max: 36.8 (03 Jun 2022 13:00)  T(F): 98.2 (03 Jun 2022 13:00), Max: 98.2 (03 Jun 2022 13:00)  HR: 59 (03 Jun 2022 12:11) (41 - 59)  BP: 122/55 (03 Jun 2022 12:11) (117/59 - 148/65)  BP(mean): 79 (03 Jun 2022 12:11) (79 - 97)  RR: 17 (03 Jun 2022 12:11) (17 - 19)  SpO2: 100% (03 Jun 2022 12:11) (95% - 100%)    Physical exam:  General: awake and alert, sitting comfortably, no acute distress    Neurologic:  Mental status: awake, alert, oriented to person, place, and time. Speech is fluent, able to name objects. Follows commands. Attention/concentration intact. No dysarthria, no aphasia.  Cranial nerves:   II: visual fields are full to confrontation. pupils equally round and reactive to light,   III, IV, VI: EOMI without nystagmus Slight pain in right eye gaze.   V:  V1-V3 sensation intact,   VII: no facial droop, facie is symmetric with normal eye closure and smile  VIII: hearing is intact to finger rub  IX, X: Uvula is midline and soft palate rises symmetrically  XI: head turning and shoulder shrug are intact.  XII: tongue midline  Motor: Normal bulk and tone, strength 5/5 in b/l UE and LE,  strength 5/5. No pronator drift  Sensation: intact to light touch. No neglect.  Coordination: No dysmetria on finger-to-nose and heel-to-shin  Reflexes: downgoing toes bilaterally  Gait: unable to assess        LABS:                        14.7   4.87  )-----------( 260      ( 03 Jun 2022 05:21 )             44.9     06-03    138  |  108  |  18  ----------------------------<  81  3.9   |  19<L>  |  1.06    Ca    9.7      03 Jun 2022 05:21  Phos  4.5     06-03  Mg     2.3     06-03    TPro  7.5  /  Alb  4.1  /  TBili  0.7  /  DBili  x   /  AST  26  /  ALT  21  /  AlkPhos  95  06-02    PT/INR - ( 03 Jun 2022 05:21 )   PT: 14.0 sec;   INR: 1.17          PTT - ( 03 Jun 2022 05:21 )  PTT:59.9 sec      RADIOLOGY & ADDITIONAL TESTS:    SPECT scan pending

## 2022-06-03 NOTE — CONSULT NOTE ADULT - ASSESSMENT
This is a 70yo woman with no PMH who presented after a flight from [facility] w/acute onset L-sided hemiparesis, found to have  proximal occlusion of two posterior right M2 branches consistent with thrombosis (not a candidate for thrombectomy).  Waiting for additional imaging and managing her hypotension.     #R-MCA Thrombosis   -- MR-NOVA pending  -- Heparin gtt stopped   -- Midodrine per primary team; on 10mg q8h but could theoretically increase to 20mg q8h if necessary   -- c/w statin     #Asymptomatic Hypotension  -- Midodrine as per above     #Asymptomatic Sinus Bradycardia  -- HR 40-60's throughout admission  -- LALO unremarkable other than LVH  -- ECG appears consistent w/admission ECG but would review w/Cardiology    #Diet - Regular  #DVT PPx - Lovenox   #Dispo - TBD    Vee Quintanilal  Attending Hospitalist  [phone] This is a 70yo woman with no PMH who presented after a flight from [facility] w/acute onset L-sided hemiparesis, found to have  proximal occlusion of two posterior right M2 branches consistent with thrombosis (not a candidate for thrombectomy).  Waiting for additional imaging and managing her hypotension.     #R-MCA Thrombosis   -- MR-NOVA pending  -- Heparin gtt stopped   -- Midodrine per primary team; on 10mg q8h but could theoretically increase to 20mg q8h if necessary   -- c/w statin   -- on therapeutic Lovenox     #Asymptomatic Hypotension  -- Midodrine as per above     #Asymptomatic Sinus Bradycardia  -- HR 40-60's throughout admission  -- LALO unremarkable other than LVH  -- ECG appears consistent w/admission ECG but would review w/Cardiology    #Diet - Regular  #DVT PPx - Lovenox as per above  #Dispo - TBD    Vee Quintanilla  Attending Hospitalist  [phone]

## 2022-06-03 NOTE — CONSULT NOTE ADULT - ATTENDING COMMENTS
Initial attending contact date  6/3/22    . See fellow note written above for details. I reviewed the fellow documentation. I have personally seen and examined this patient. I reviewed vitals, labs, medications, cardiac studies, and additional imaging. I agree with the above fellow's findings and plans as written above with the following additions/statements.    72yo F from [facility] presenting with R MCA infarct with improvement in symptoms without intervention.   she has no ischemic or congestive symptoms and her echo revealed mild LVH and normal function without valvular disease.  Cardiology consulted for bradycardia    -EKG and tele with sinus gio 45-60 with nonspecific ST changes, no acute ischemic changes   -bradycardia unrelated to acute CVA  -ECHO with normal LVEF, mild LVH  -agree with long term monitoring to check for A fib  -Meds per primary team   -Pls reconsult as needed

## 2022-06-03 NOTE — CONSULT NOTE ADULT - CONSULT REASON
L hemiparesis
abnormal ECG
thrombectomy watch
Co-Management of Co-Morbidities
PM&R evaluation
small hypodensity in right insula suspicious for acute infarct.

## 2022-06-03 NOTE — PROGRESS NOTE ADULT - PROBLEM SELECTOR PLAN 1
secondary stroke prevention  MRI SPECT and NOVA
Physical therapy - want patient to sit up in a chair for at least 1 hour every day over the weekend    NOVA scan pending    Loop recorder ordered to assess the bradycardia/A-fib

## 2022-06-03 NOTE — CONSULT NOTE ADULT - SUBJECTIVE AND OBJECTIVE BOX
Stroke Co-Management Initial Consult Note    HPI:  This is a 70yo woman with no PMH who presented after a flight from [facility] w/acute onset L-sided hemiparesis.  Our 4PM on day of presentation her family found her with profound left sided weakness.  EMS was called. BP with /100.  Per EMS, no movement in the left arm nor leg, left facial droop, and right gaze preference and could not overcome.  On presentation to the ED her BP was 163/83 with improving exam.  She was AOx3, answering questions and following commands.  She had R-gaze preference and could easily overcome it, when attention was brought to the left side.  LUE with drift but did not hit her bed.  Minor left facial droop (although son says patient's face at baseline), extinguishes to DST on the left.  CTA head and neck with proximal occlusion of two posterior right M2.  She was a candidate for tPA.  While mixing the tPA, her exam improved to an NIHSS 0.  The decision was made to not give tPA due to a return to her baseline.  Endovascular neurosurgery was consulted regarding her R-M2 occlusion.  She was admitted to the MICU.  She's been noted to by hypotensive and with orthostasis, as well as asymptomatic bradycardia  TTE unremarkable.  She was transferred to SDU overnight on heparin gtt, while she waits for a MR-NOVA.    On ROS this AM she denies lightheadedness, changes in vision, near syncope, palpitations, CP and SOB.  She reports her L-sided strength and sensation is unchanged.  At the time she was told her HR was low (overnight), she denies any associated symptoms.  She's been feeding herself without difficulty, +BM and voiding.  Remainder of 12-point ROS otherwise negative.     PAST MEDICAL & SURGICAL HISTORY: No PMH.      FAMILY HISTORY:  No family history of strokes.       SOCIAL HISTORY:   Patient currently visiting from [place], staying with son.   Smoking status: denies  Drinking: denies  Drug Use: denies   Independent with ADL's.    MEDICATIONS  (STANDING):  dextrose 50% Injectable 25 milliLiter(s) IV Push Once    MEDICATIONS  (PRN):    Allergies  No Known Allergies    CURRENT MEDICATIONS:   atorvastatin 80 milliGRAM(s) Oral daily  enoxaparin Injectable 84 milliGRAM(s) SubCutaneous every 12 hours  midodrine 10 milliGRAM(s) Oral every 8 hours  polyethylene glycol 3350 17 Gram(s) Oral daily    VITAL SIGNS, INS/OUTS (last 24 hours):  Vital Signs Last 24 Hrs  T(C): 36.6 (03 Jun 2022 09:13), Max: 36.8 (02 Jun 2022 13:28)  T(F): 97.9 (03 Jun 2022 09:13), Max: 98.3 (02 Jun 2022 13:28)  HR: 55 (03 Jun 2022 08:16) (41 - 55)  BP: 148/65 (03 Jun 2022 08:16) (117/59 - 148/65)  BP(mean): 93 (03 Jun 2022 08:16) (82 - 97)  RR: 18 (03 Jun 2022 08:16) (18 - 19)  SpO2: 100% (03 Jun 2022 08:16) (95% - 100%)  I&O's Summary    02 Jun 2022 07:01  -  03 Jun 2022 07:00  --------------------------------------------------------  IN: 357 mL / OUT: 0 mL / NET: 357 mL    03 Jun 2022 07:01  -  03 Jun 2022 11:38  --------------------------------------------------------  IN: 180 mL / OUT: 400 mL / NET: -220 mL    EXAM:  Gen: NAD, sitting upright in bed  HEENT: NCAT, MMM, clear OP  Neck: supple, trachea at midline  CV: bradycardic but regular, no m/g/r appreciated  Pulm: CTA B, no w/r/r; no increase in WOB  Abd: normoactive BS, soft, NTND  Ext: WWP, 2+ pulses x4; no c/c/e  Neuro: AOx3, CN II-XII grossly intact; nonfocal  Psych: pleasant, conversant and appropriate    BASIC LABS:                        14.7   4.87  )-----------( 260      ( 03 Jun 2022 05:21 )             44.9     06-03    138  |  108  |  18  ----------------------------<  81  3.9   |  19<L>  |  1.06    Ca    9.7      03 Jun 2022 05:21  Phos  4.5     06-03  Mg     2.3     06-03    TPro  7.5  /  Alb  4.1  /  TBili  0.7  /  DBili  x   /  AST  26  /  ALT  21  /  AlkPhos  95  06-02    PT/INR - ( 03 Jun 2022 05:21 )   PT: 14.0 sec;   INR: 1.17     PTT - ( 03 Jun 2022 05:21 )  PTT:59.9 sec    MICRODATA:  -- No new microdata.    IMAGING:  -- LALO (6/2)    1. Mild to moderate symmetric left ventricular hypertrophy.   2. Normal left ventricular systolic function.   3. Normal right ventricular size and systolic function.   4. No LA/WENDIE thrombus seen.   5. No evidence of an intracardiac shunt.   6. No significant valvular disease.   7. No pericardial effusion.   8. The aortic root is normal in size. There is no significant plaque   seen in the visualized portion of the descending aorta. There is no   significant plaque seen in the visualized portion of the aortic arch.    EKG: (my read today) bradycardic, TWI inferior leads and lateral leads difficult to appreciate but suspect TWI in ECG from 5/29)

## 2022-06-03 NOTE — PROGRESS NOTE ADULT - ASSESSMENT
70 y/o F with no pmh and recent long flight from [place] ([facility]) on Wednesday presented to [facility] on 5/29 with L hemiparesis, L facial droop and R gaze deviation. Initial NIHSS 4. Initial CTH without acute abnormality. CTP with elevated Tmax in the R M2 region. CTA head and neck with proximal occlusion of two posterior right M2. Patient improved to NIHSS 0 so no TPA was given. Repeat CTH on 5/29 with small hypodensity in right insula suspicious for acute infarct. Patient not a thrombectomy candidate. Currently awaiting MR NOVA and ILR placement    Neuro  #CVA workup  - q4hr stroke neuro checks and vitals  - pending MR NOVA  - Spect: reversible defect in RM2. Slight decreased activity in L cerebral hemisphere due to relative steal.   - heparin gtt discontinued on 6/3. initiated 80mg Lovenox BID  - Stroke Code HCT Results: no acute abnormality --> Interval development of a small focal hypodensity in the right insula suspicious for acute infarction. No mass effect or hemorrhagic transformation.  - Stroke Code CTA Results: Proximal occlusion of two posterior right M2 branches consistent with thrombosis. Paucity of flow in the posterior right MCA distal to this level.  - Stroke education    Cards  - Goal -180  - spoke with EP about ILR - they are concerned about financial burden given patient has no insurance and going to [place] after 6 months. Family is looking into travelers insurance and SW says patient qualifies for emergency Medicaid  - orthostatics +: 126/56 sitting, 90/70 standing - on midodrine 10mg q8h  - added compression stockings  - Please check blood pressures after 1 hour of sitting at 45 degrees, then after 1 hour of sitting at 90 degrees. Assess for any changes in neurological exam  - TTE: no PFO, EF 65%  - LALO: no LA thrombus  - LDL results: 65  - c/w atorvastatin 80mg  - LE doppler neg  - hypercoag panel sent, f/u results    Pulm  - call provider if SPO2 < 94%    GI  #Nutrition/Fluids/Electrolytes   - replete K<4 and Mg <2  - Diet: DASH  - IVF: n/a    Renal  - daily BMP    Endocrine  - A1C results: 5.1  - TSH results: 1.04    DVT Prophylaxis  - SCDs, hep gtt being monitored by aPTT daily    Dispo: AR     Discussed daily hospital plans and goals with patient and family at bedside.     Discussed with Neurology Attending Dr. Christianson   70 y/o F with no pmh and recent long flight from [place] ([facility]) on Wednesday presented to [facility] on 5/29 with L hemiparesis, L facial droop and R gaze deviation. Initial NIHSS 4. Initial CTH without acute abnormality. CTP with elevated Tmax in the R M2 region. CTA head and neck with proximal occlusion of two posterior right M2. Patient improved to NIHSS 0 so no TPA was given. Repeat CTH on 5/29 with small hypodensity in right insula suspicious for acute infarct. Patient not a thrombectomy candidate. Currently awaiting MR NOVA and ILR placement    Neuro  #CVA workup  - q4hr stroke neuro checks and vitals  - pending MR NOVA  - Spect: reversible defect in RM2. Slight decreased activity in L cerebral hemisphere due to relative steal.   - heparin gtt discontinued on 6/3. initiated 80mg Lovenox BID  - Stroke Code HCT Results: no acute abnormality --> Interval development of a small focal hypodensity in the right insula suspicious for acute infarction. No mass effect or hemorrhagic transformation.  - Stroke Code CTA Results: Proximal occlusion of two posterior right M2 branches consistent with thrombosis. Paucity of flow in the posterior right MCA distal to this level.  - Stroke education    Cards  - Goal -180  - spoke with EP about ILR - they are concerned about financial burden given patient has no insurance and going to [place] after 6 months here. Family is looking into travelers insurance and SW says patient qualifies for emergency Medicaid, however, that will only cover inpatient hospital visit. Other option is for Zio Patch; this will also not be covered by emergency Medicaid - need to have discussion with family about cost and ability to cover.   - orthostatics +: 126/56 sitting, 90/70 standing - on midodrine 10mg q8h  - added compression stockings  - Please check blood pressures after 1 hour of sitting at 45 degrees, then after 1 hour of sitting at 90 degrees. Assess for any changes in neurological exam, maintain SBP >120  - TTE: no PFO, EF 65%  - LALO: no LA thrombus  - LDL results: 65  - c/w atorvastatin 80mg  - LE doppler neg  - hypercoag panel sent, f/u results    Pulm  - call provider if SPO2 < 94%    GI  #Nutrition/Fluids/Electrolytes   - replete K<4 and Mg <2  - Diet: DASH  - IVF: n/a    Renal  - daily BMP    Endocrine  - A1C results: 5.1  - TSH results: 1.04    DVT Prophylaxis  - SCDs, hep gtt being monitored by aPTT daily    Dispo: AR     Discussed daily hospital plans and goals with patient and family at bedside.     Discussed with Neurology Attending Dr. Christianson

## 2022-06-03 NOTE — CONSULT NOTE ADULT - SUBJECTIVE AND OBJECTIVE BOX
HPI: 70 y/o F with no pmh and recent long flight from [place] ([facility]) on Wednesday presented to [facility] on 5/29 with L hemiparesis, L facial droop and R gaze deviation. Initial NIHSS 4. Initial CTH without acute abnormality. CTP with elevated Tmax in the R M2 region. CTA head and neck with proximal occlusion of two posterior right M2. Patient improved to NIHSS 0 so no TPA was given. Repeat CTH on 5/29 with small hypodensity in right insula suspicious for acute infarct, not a candidate for thrombectomy.     Patient denies CP, SOB, prior episodes of weakness and numbness. She has b/l LE flushing and "heat" occasionally, but never weakness, numbness nor paresthesia.     In [facility] she is very active and denies any exercise limiting symptoms, no chest pain or pressure in the past, no h/o MI or cardiac stent.   She underwent an echo as part of her stroke w.u which was normal with mild LVH.   Her ECG was abnormal , cardiology consulted to evaluate.       ROS: A 10-point review of systems was otherwise negative.  SOCIAL HISTORY:   Patient currently visiting from [place], staying with son.   Smoking status:  Drinking:  Drug Use:     ALLERGIES: 	  No Known Allergies    MEDICATIONS:  atorvastatin 80 milliGRAM(s) Oral daily  enoxaparin Injectable 84 milliGRAM(s) SubCutaneous every 12 hours  midodrine 10 milliGRAM(s) Oral every 8 hours  polyethylene glycol 3350 17 Gram(s) Oral daily      PHYSICAL EXAM:  T(C): 36.8 (06-03-22 @ 13:00), Max: 36.8 (06-02-22 @ 13:28)  HR: 59 (06-03-22 @ 12:11) (41 - 59)  BP: 122/55 (06-03-22 @ 12:11) (117/59 - 148/65)  RR: 17 (06-03-22 @ 12:11) (17 - 19)  SpO2: 100% (06-03-22 @ 12:11) (95% - 100%)  Wt(kg): --    GEN: Awake, comfortable. NAD.   HEENT: NCAT, PERRL, EOMI. Mucosa moist. No JVD.   RESP: CTA b/l  CV: gio, normal s1/s2. No m/r/g.  ABD: Soft, NTND. BS+  EXT: Warm. No edema, clubbing, or cyanosis.   NEURO: AAOx3. No focal deficits.    I&O's Summary    02 Jun 2022 07:01  -  03 Jun 2022 07:00  --------------------------------------------------------  IN: 357 mL / OUT: 0 mL / NET: 357 mL    03 Jun 2022 07:01  -  03 Jun 2022 13:02  --------------------------------------------------------  IN: 455 mL / OUT: 400 mL / NET: 55 mL        LABS:	 	                        14.7   4.87  )-----------( 260      ( 03 Jun 2022 05:21 )             44.9     06-03    138  |  108  |  18  ----------------------------<  81  3.9   |  19<L>  |  1.06    Ca    9.7      03 Jun 2022 05:21  Phos  4.5     06-03  Mg     2.3     06-03    TPro  7.5  /  Alb  4.1  /  TBili  0.7  /  DBili  x   /  AST  26  /  ALT  21  /  AlkPhos  95  06-02        ECG - sinus bradycardia with Non specific T wave abnormalities    < from: LALO w/Doppler (06.02.22 @ 18:52) >   1. Mild to moderate symmetric left ventricular hypertrophy.   2. Normal left ventricular systolic function.   3. Normal right ventricular size and systolic function.   4. No LA/WENDIE thrombus seen.   5. No evidence of an intracardiac shunt.   6. No significant valvular disease.   7. No pericardial effusion.   8. The aortic root is normal in size. There is no significant plaque   seen in the visualized portion of the descending aorta. There is no   significant plaque seen in the visualized portion of the aortic arch.    < end of copied text >  < from: TTE Echo Complete w/o Contrast w/ Doppler (05.31.22 @ 11:21) >     1. Normal left ventricular size and systolic function.   2. Left ventricular endocardium is not well visualizaed, unable to   definitively evaluate left ventricular wall motion.   3. Mild symmetric left ventricular hypertrophy.   4. Normal right ventricular size and systolic function.   5. Normal atria.   6. No significant valvular disease.   7. No pericardial effusion.   8. No prior echo is available for comparison.   9. Consider repeat TTE with imaging solution such as Definity to better   enhance the endocardium and evaluate the left ventricular function, if   clinically indicated.    < end of copied text >

## 2022-06-03 NOTE — CONSULT NOTE ADULT - CONSULT REQUESTED DATE/TIME
29-May-2022 17:17
29-May-2022 23:06
31-May-2022 05:05
03-Jun-2022 11:37
03-Jun-2022 13:02
31-May-2022 12:59

## 2022-06-04 LAB
ANION GAP SERPL CALC-SCNC: 11 MMOL/L — SIGNIFICANT CHANGE UP (ref 5–17)
BUN SERPL-MCNC: 16 MG/DL — SIGNIFICANT CHANGE UP (ref 7–23)
CALCIUM SERPL-MCNC: 9.6 MG/DL — SIGNIFICANT CHANGE UP (ref 8.4–10.5)
CHLORIDE SERPL-SCNC: 109 MMOL/L — HIGH (ref 96–108)
CO2 SERPL-SCNC: 20 MMOL/L — LOW (ref 22–31)
CREAT SERPL-MCNC: 0.99 MG/DL — SIGNIFICANT CHANGE UP (ref 0.5–1.3)
EGFR: 61 ML/MIN/1.73M2 — SIGNIFICANT CHANGE UP
GLUCOSE SERPL-MCNC: 97 MG/DL — SIGNIFICANT CHANGE UP (ref 70–99)
HCT VFR BLD CALC: 42.7 % — SIGNIFICANT CHANGE UP (ref 34.5–45)
HGB BLD-MCNC: 14 G/DL — SIGNIFICANT CHANGE UP (ref 11.5–15.5)
MAGNESIUM SERPL-MCNC: 2.3 MG/DL — SIGNIFICANT CHANGE UP (ref 1.6–2.6)
MCHC RBC-ENTMCNC: 30.3 PG — SIGNIFICANT CHANGE UP (ref 27–34)
MCHC RBC-ENTMCNC: 32.8 GM/DL — SIGNIFICANT CHANGE UP (ref 32–36)
MCV RBC AUTO: 92.4 FL — SIGNIFICANT CHANGE UP (ref 80–100)
NRBC # BLD: 0 /100 WBCS — SIGNIFICANT CHANGE UP (ref 0–0)
PHOSPHATE SERPL-MCNC: 4.3 MG/DL — SIGNIFICANT CHANGE UP (ref 2.5–4.5)
PLATELET # BLD AUTO: 285 K/UL — SIGNIFICANT CHANGE UP (ref 150–400)
POTASSIUM SERPL-MCNC: 3.8 MMOL/L — SIGNIFICANT CHANGE UP (ref 3.5–5.3)
POTASSIUM SERPL-SCNC: 3.8 MMOL/L — SIGNIFICANT CHANGE UP (ref 3.5–5.3)
RBC # BLD: 4.62 M/UL — SIGNIFICANT CHANGE UP (ref 3.8–5.2)
RBC # FLD: 13.8 % — SIGNIFICANT CHANGE UP (ref 10.3–14.5)
SODIUM SERPL-SCNC: 140 MMOL/L — SIGNIFICANT CHANGE UP (ref 135–145)
WBC # BLD: 5.67 K/UL — SIGNIFICANT CHANGE UP (ref 3.8–10.5)
WBC # FLD AUTO: 5.67 K/UL — SIGNIFICANT CHANGE UP (ref 3.8–10.5)

## 2022-06-04 PROCEDURE — 99233 SBSQ HOSP IP/OBS HIGH 50: CPT

## 2022-06-04 PROCEDURE — 99232 SBSQ HOSP IP/OBS MODERATE 35: CPT

## 2022-06-04 RX ORDER — ACETAMINOPHEN 500 MG
650 TABLET ORAL EVERY 6 HOURS
Refills: 0 | Status: DISCONTINUED | OUTPATIENT
Start: 2022-06-04 | End: 2022-06-09

## 2022-06-04 RX ADMIN — MIDODRINE HYDROCHLORIDE 10 MILLIGRAM(S): 2.5 TABLET ORAL at 05:31

## 2022-06-04 RX ADMIN — MIDODRINE HYDROCHLORIDE 10 MILLIGRAM(S): 2.5 TABLET ORAL at 13:53

## 2022-06-04 RX ADMIN — ATORVASTATIN CALCIUM 80 MILLIGRAM(S): 80 TABLET, FILM COATED ORAL at 21:27

## 2022-06-04 RX ADMIN — Medication 650 MILLIGRAM(S): at 14:30

## 2022-06-04 RX ADMIN — ENOXAPARIN SODIUM 80 MILLIGRAM(S): 100 INJECTION SUBCUTANEOUS at 05:31

## 2022-06-04 RX ADMIN — MIDODRINE HYDROCHLORIDE 10 MILLIGRAM(S): 2.5 TABLET ORAL at 21:28

## 2022-06-04 RX ADMIN — ENOXAPARIN SODIUM 80 MILLIGRAM(S): 100 INJECTION SUBCUTANEOUS at 17:54

## 2022-06-04 RX ADMIN — Medication 650 MILLIGRAM(S): at 15:30

## 2022-06-04 NOTE — PROGRESS NOTE ADULT - ASSESSMENT
70 y/o F with no pmh and recent long flight from [place] ([facility]) on Wednesday presented to [facility] on 5/29 with L hemiparesis, L facial droop and R gaze deviation. Initial NIHSS 4. Initial CTH without acute abnormality. CTP with elevated Tmax in the R M2 region. CTA head and neck with proximal occlusion of two posterior right M2. Patient improved to NIHSS 0 so no TPA was given. Repeat CTH on 5/29 with small hypodensity in right insula suspicious for acute infarct. Patient not a thrombectomy candidate. Currently awaiting MR NOVA     Neuro  #CVA workup  - q4hr stroke neuro checks and vitals  - pending MR NOVA  - Spect: reversible defect in RM2. Slight decreased activity in L cerebral hemisphere due to relative steal.   - continue Lovenox 80mg BID  - Stroke Code HCT Results: no acute abnormality --> Interval development of a small focal hypodensity in the right insula suspicious for acute infarction. No mass effect or hemorrhagic transformation.  - Stroke Code CTA Results: Proximal occlusion of two posterior right M2 branches consistent with thrombosis. Paucity of flow in the posterior right MCA distal to this level.  - Stroke education    Cards  - Goal -180  - spoke with EP about ILR - they are concerned about financial burden given patient has no insurance and going to [place] after 6 months here. Family is looking into travelers insurance and SW says patient qualifies for emergency Medicaid, however, that will only cover inpatient hospital visit. Other option is for Zio Patch; this will also not be covered by emergency Medicaid - need to have discussion with family about cost and ability to cover.   - orthostatics +: 126/56 sitting, 90/70 standing - on midodrine 10mg q8h  - added compression stockings  - maintain blood pressures >120  - TTE: no PFO, EF 65%  - LALO: no LA thrombus  - LDL results: 65  - c/w atorvastatin 80mg  - LE doppler neg  - hypercoag panel sent, f/u results    Pulm  - call provider if SPO2 < 94%    GI  #Nutrition/Fluids/Electrolytes   - replete K<4 and Mg <2  - Diet: DASH  - IVF: n/a    Renal  - daily BMP    Endocrine  - A1C results: 5.1  - TSH results: 1.04    DVT Prophylaxis  - SCDs, hep gtt being monitored by aPTT daily    Dispo: AR     Discussed daily hospital plans and goals with patient and family at bedside.     Discussed with Neurology Attending Dr. Christianson

## 2022-06-04 NOTE — PROGRESS NOTE ADULT - ASSESSMENT
This is a 72yo woman with no PMH who presented after a flight from [facility] w/acute onset L-sided hemiparesis, found to have  proximal occlusion of two posterior right M2 branches consistent with thrombosis (not a candidate for thrombectomy).  Hospital course notable for asymptomatic bradycardia and asymptomatic hypotension.  Waiting for MR-NOVA.     #R-MCA Thrombosis   -- MR-NOVA pending  -- Heparin gtt stopped; now on therapeutic Lovenox BID    -- Midodrine per primary team; on 10mg q8h  -- c/w statin   -- ILR considered but given cost burden her primary team is investigating alternative monitoring      #Asymptomatic Hypotension  -- Midodrine as per above     #Asymptomatic Sinus Bradycardia  -- HR 40-60's throughout admission  -- LALO unremarkable other than LVH  -- ECG appears consistent w/admission ECG; seen by Cardiology as well    #Uninsured  #Foreign traveler  -- visiting from [facility] and has no insurance  -- SW, RE: [employer] meds  -- will f/u via [facility]ario or at a [facility]   -- plans to be here until October     #Diet - Regular  #DVT PPx - Lovenox as per above  #Dispo - TBD    Vee Quintanilla  Attending Hospitalist  [phone]

## 2022-06-04 NOTE — PROGRESS NOTE ADULT - SUBJECTIVE AND OBJECTIVE BOX
Neurology Stroke Progress Note    INTERVAL HPI/OVERNIGHT EVENTS:  Patient seen and examined. Family at bedside. Patient with no complaints this morning.     MEDICATIONS  (STANDING):  atorvastatin 80 milliGRAM(s) Oral daily  enoxaparin Injectable 80 milliGRAM(s) SubCutaneous every 12 hours  midodrine 10 milliGRAM(s) Oral every 8 hours  polyethylene glycol 3350 17 Gram(s) Oral daily    MEDICATIONS  (PRN):  acetaminophen Tablet .. 650 milliGRAM(s) Oral every 6 hours PRN Moderate Pain (4 - 6)      Allergies    No Known Allergies    Intolerances        ROS: As per HPI, otherwise negative    Vital Signs Last 24 Hrs  T(C): 36.8 (04 Jun 2022 09:30), Max: 36.8 (03 Jun 2022 13:00)  T(F): 98.2 (04 Jun 2022 09:30), Max: 98.2 (03 Jun 2022 13:00)  HR: 43 (04 Jun 2022 08:34) (43 - 64)  BP: 157/68 (04 Jun 2022 08:34) (122/55 - 169/73)  BP(mean): 98 (04 Jun 2022 08:34) (79 - 105)  RR: 18 (04 Jun 2022 08:34) (16 - 18)  SpO2: 100% (04 Jun 2022 08:34) (95% - 100%)    Physical exam:  General: No acute distress, awake and alert  Eyes: Anicteric sclerae, moist conjunctivae, see below for CNs  Neck: trachea midline  Cardiovascular: Bradycardic on monitor  Pulmonary: No use of accessory muscles  GI: Abdomen soft, non-distended, non-tender  Extremities: Radial and DP pulses +2, no edema    Neurologic:  -Mental status: Awake, alert, oriented to person, place, and time. Speech is fluent with intact naming, repetition, and comprehension, no dysarthria. Recent and remote memory intact. Follows commands. Attention/concentration intact. Fund of knowledge appropriate.  -Cranial nerves:   II: Visual fields are full to confrontation.  III, IV, VI: Extraocular movements are intact without nystagmus. Pupils equally round and reactive to light  VII: Face is symmetric with normal eye closure and smile  Motor: Normal bulk and tone. No pronator drift. Strength bilateral upper extremity 5/5, bilateral lower extremities 5/5.  Sensation: Intact to light touch bilaterally. No neglect or extinction on double simultaneous testing.  Coordination: No dysmetria on finger-to-nose and heel-to-shin bilaterally      LABS:                        14.0   5.67  )-----------( 285      ( 04 Jun 2022 05:33 )             42.7     06-04    140  |  109<H>  |  16  ----------------------------<  97  3.8   |  20<L>  |  0.99    Ca    9.6      04 Jun 2022 05:33  Phos  4.3     06-04  Mg     2.3     06-04      PT/INR - ( 03 Jun 2022 05:21 )   PT: 14.0 sec;   INR: 1.17          PTT - ( 03 Jun 2022 05:21 )  PTT:59.9 sec      RADIOLOGY & ADDITIONAL TESTS:  CT Head No Cont (05.30.22 @ 15:07) >  Impression:    Interval development of a small focal hypodensity in the right insula   suspicious for acute infarction. No mass effect or hemorrhagic   transformation.    NM SPECT/CT Brain, Single Area Multi Day (06.01.22 @ 12:10) >    Impression:  Reversible defect in the distribution of the right M2   segment corresponding to an abnormality on recent CT angiographyand   perfusion study.  Slightly decreased activity in a portion of the left   cerebral hemisphere may be due to a relative steal, that is, increased   activity in the uninvolved portion of the right cerebral hemisphere   compared to the normal left.

## 2022-06-04 NOTE — PROGRESS NOTE ADULT - ASSESSMENT
per Neurology    72 y/o F with no pmh and recent long flight from [place] ([facility]) on Wednesday presented to [facility] on 5/29 with L hemiparesis, L facial droop and R gaze deviation. Initial NIHSS 4. Initial CTH without acute abnormality. CTP with elevated Tmax in the R M2 region. CTA head and neck with proximal occlusion of two posterior right M2. Patient improved to NIHSS 0 so no TPA was given. Repeat CTH on 5/29 with small hypodensity in right insula suspicious for acute infarct. Patient not a thrombectomy candidate. Currently awaiting MR NOVA     Neuro  #CVA workup  - q4hr stroke neuro checks and vitals  - pending MR NOVA  - Spect: reversible defect in RM2. Slight decreased activity in L cerebral hemisphere due to relative steal.   - continue Lovenox 80mg BID  - Stroke Code HCT Results: no acute abnormality --> Interval development of a small focal hypodensity in the right insula suspicious for acute infarction. No mass effect or hemorrhagic transformation.  - Stroke Code CTA Results: Proximal occlusion of two posterior right M2 branches consistent with thrombosis. Paucity of flow in the posterior right MCA distal to this level.  - Stroke education    Cards  - Goal -180  - spoke with EP about ILR - they are concerned about financial burden given patient has no insurance and going to [place] after 6 months here. Family is looking into travelers insurance and SW says patient qualifies for emergency Medicaid, however, that will only cover inpatient hospital visit. Other option is for [facility]; this will also not be covered by emergency Medicaid - need to have discussion with family about cost and ability to cover.   - orthostatics +: 126/56 sitting, 90/70 standing - on midodrine 10mg q8h  - added compression stockings  - maintain blood pressures >120  - TTE: no PFO, EF 65%  - LALO: no LA thrombus  - LDL results: 65  - c/w atorvastatin 80mg  - LE doppler neg  - hypercoag panel sent, f/u results    Pulm  - call provider if SPO2 < 94%    GI  #Nutrition/Fluids/Electrolytes   - replete K<4 and Mg <2  - Diet: DASH  - IVF: n/a    Renal  - daily BMP    Endocrine  - A1C results: 5.1  - TSH results: 1.04    DVT Prophylaxis  - SCDs, hep gtt being monitored by aPTT daily

## 2022-06-04 NOTE — PROGRESS NOTE ADULT - SUBJECTIVE AND OBJECTIVE BOX
Physical Medicine and Rehabilitation Progress Note:    Patient is a 71y old  Female who presents with a chief complaint of R MCA infarct (03 Jun 2022 14:53)      HPI:  HPI: 71y Female with no past medical history, not on any meds, just got off a long flight from [place] this past Wednesday presents with left sided hemiparesis. LKW 2:30 PM today when she was with son (Axel [phone], at bedside). Then around 4pm, family found patient with profound left sided weakness. EMS was called. BP with /100. Per EMS, no movement in the left arm nor leg, left facial droop, and right gaze preference and could not overcome.     On presentation to ED, 163/83 with improving exam. AOx3, answering questions and following commands. Right gaze preference and can easily overcome when attention brought to the left side. LUE with drift, does not hit bed. Minor left facial droop (although son says patient's face at baseline), extinguishes to DST on the left. NIHSS 4. CTH with no hemorrhage. CTP with elevated Tmax in the R M2 region. CTA head and neck with proximal occlusion of two posterior right M2. Patient is a tpa candidate as CTH is negative for hemorrhage, within window and although improving exam, with disabling deficits. While mixing tpa, patient exam improved to an NIHSS 0. Drift no longer present, gaze midline and attending bilaterally without preference, sensation intact b/l without extinguishing on DST. Decision made to not give tpa due to return to baseline. Endovascular neurosurgery consulted regarding RM2 occlusion. Patient currently not a thrombectomy candidate due to NIHSS 0 with no focal deficits.     Patient denies CP, SOB, prior episodes of weakness and numbness. She has b/l LE flushing and "heat" occasionally, but never weakness, numbness nor paresthesia.     T(C): --  HR: 59 (05-29-22 @ 17:30) (59 - 59)  BP: 163/83 (05-29-22 @ 17:30) (163/83 - 163/83)  RR: 18 (05-29-22 @ 17:30) (18 - 18)  SpO2: 98% (05-29-22 @ 17:30) (98% - 98%)    PAST MEDICAL & SURGICAL HISTORY:      FAMILY HISTORY:      SOCIAL HISTORY:   Patient currently visiting from [place], staying with son.   Smoking status:  Drinking:  Drug Use:     ROS:   Constitutional: No fever, weight loss or fatigue  Eyes: No eye pain, visual disturbances, or discharge  ENMT:  No difficulty hearing, tinnitus; No sinus or throat pain  Neck: No pain or stiffness  Respiratory: No cough, wheezing, chills or hemoptysis  Cardiovascular: No chest pain, palpitations, shortness of breath, or leg swelling  Gastrointestinal: No abdominal pain. No nausea, vomiting or hematemesis; No diarrhea or constipation. Nohematochezia.  Genitourinary: No dysuria, frequency, hematuria or incontinence  Neurological: As per HPI    MEDICATIONS  (STANDING):  dextrose 50% Injectable 25 milliLiter(s) IV Push Once    MEDICATIONS  (PRN):    Allergies    No Known Allergies    Intolerances      Vital Signs Last 24 Hrs  T(C): --  T(F): --  HR: 59 (29 May 2022 17:30) (59 - 59)  BP: 163/83 (29 May 2022 17:30) (163/83 - 163/83)  BP(mean): --  RR: 18 (29 May 2022 17:30) (18 - 18)  SpO2: 98% (29 May 2022 17:30) (98% - 98%)    Physical exam:  Constitutional: No acute distress, conversant  Eyes: Anicteric sclerae, moist conjunctivae, see below for CNs  Neck: trachea midline, FROM, supple, no thyromegaly or lymphadenopathy  Cardiovascular: Some PVCs on tele, no afib.   Pulmonary: No increased work of breathing. No use of accessory muscles  GI: Abdomen soft, non-distended, non-tender  Extremities: no edema    Neurologic: on immediate presentation to the ED  -Mental status: Awake, alert, oriented to person, place, and time. Speech is fluent with intact naming, repetition, and comprehension, no dysarthria. Recent and remote memory intact. Follows commands. Attention/concentration intact. Fund of knowledge appropriate.  -Cranial nerves:   II: Visual fields are full to confrontation.  III, IV, VI: Extraocular movements are intact without nystagmus. RIght gaze preference but can cross and bury to the left. Can attend to the left easily when attention brought to that side. Pupils equally round and reactive to light  V:  Facial sensation V1-V3 equal and intact   VII: Mild left facial droop   VIII: Hearing is grossly intact.   Motor: Normal bulk and tone. LUE drift, does not hit bed. All other extremities antigravity without drift.   Sensation: Intact to light touch bilaterally. Extinguishes on the left with DST  Coordination: No dysmetria on finger-to-nose bilaterally  NIHSS: 4      Neurologic: after CT scans, in resus room  -Mental status: Awake, alert, oriented to person, place, and time. Speech is fluent with intact naming, repetition, and comprehension, no dysarthria. Recent and remote memory intact. Follows commands. Attention/concentration intact. Fund of knowledge appropriate.  -Cranial nerves:   II: Visual fields are full to confrontation.  III, IV, VI: Extraocular movements are intact without nystagmus. Pupils equally round and reactive to light  V:  Facial sensation V1-V3 equal and intact   VII: Face is symmetric with normal eye closure and smile  VIII: Hearing is grossly intact bilaterally  XII: Tongue protrudes midline  Motor: Normal bulk and tone. No pronator drift. Strength bilateral upper extremity 5/5, bilateral lower extremities 5/5.  Sensation: Intact to light touch bilaterally. No neglect or extinction on double simultaneous testing.  Coordination: No dysmetria on finger-to-nose bilaterally  NIHSS: 0        Fingerstick Blood Glucose: CAPILLARY BLOOD GLUCOSE  78 (29 May 2022 18:13)      POCT Blood Glucose.: 78 mg/dL (29 May 2022 17:31)    LABS:                        14.3   6.23  )-----------( 345      ( 29 May 2022 17:36 )             44.1     05-29    142  |  105  |  17  ----------------------------<  85  4.0   |  27  |  1.17    Ca    9.8      29 May 2022 17:36    TPro  7.8  /  Alb  4.5  /  TBili  0.5  /  DBili  x   /  AST  24  /  ALT  17  /  AlkPhos  99  05-29    PT/INR - ( 29 May 2022 17:36 )   PT: 13.1 sec;   INR: 1.10          PTT - ( 29 May 2022 17:36 )  PTT:29.1 sec  CARDIAC MARKERS ( 29 May 2022 17:36 )  x     / 0.01 ng/mL / x     / x     / x              RADIOLOGY & ADDITIONAL STUDIES:    < from: CT Brain Stroke Protocol (05.29.22 @ 17:43) >  IMPRESSION:  1. No acute intracranial abnormality.  2. ASPECTS (Alberta Stroke Program Early CT Score) is 10.    < end of copied text >  < from: CT Perfusion w/ Maps w/ IV Cont (05.29.22 @ 17:43) >  IMPRESSION:  1. Moderate size area of right MCA distribution acute ischemia in the  distribution of the M2 branch occlusions.  2. No CT perfusion evidence of acute infarction.    < end of copied text >  < from: CT Angio Head w/ IV Cont (05.29.22 @ 17:43) >  IMPRESSION:  1. Proximal occlusion of two posterior right M2 branches consistent with  thrombosis. Paucity of flow in the posterior right MCA distal to this   level.  2. Otherwise patent intracranial arterial system.    < end of copied text >  < from: CT Angio Neck w/ IV Cont (05.29.22 @ 17:43) >  IMPRESSION:  No stenosis or occlusion.    < end of copied text >    -----------------------------------------------------------------------------------------------------------------  IV-tPA (Y/N):    N                              Bolus time:    Alteplase Dose Verification w/ RN:  Reason IV-tPA not given: rapidly improving exam, back to baseline (29 May 2022 22:54)                            14.0   5.67  )-----------( 285      ( 04 Jun 2022 05:33 )             42.7       06-04    140  |  109<H>  |  16  ----------------------------<  97  3.8   |  20<L>  |  0.99    Ca    9.6      04 Jun 2022 05:33  Phos  4.3     06-04  Mg     2.3     06-04      Vital Signs Last 24 Hrs  T(C): 36.8 (04 Jun 2022 09:30), Max: 36.8 (04 Jun 2022 09:30)  T(F): 98.2 (04 Jun 2022 09:30), Max: 98.2 (04 Jun 2022 09:30)  HR: 56 (04 Jun 2022 12:25) (43 - 64)  BP: 137/69 (04 Jun 2022 12:25) (122/93 - 169/73)  BP(mean): 96 (04 Jun 2022 12:25) (82 - 105)  RR: 17 (04 Jun 2022 12:25) (16 - 18)  SpO2: 100% (04 Jun 2022 12:25) (95% - 100%)    MEDICATIONS  (STANDING):  atorvastatin 80 milliGRAM(s) Oral daily  enoxaparin Injectable 80 milliGRAM(s) SubCutaneous every 12 hours  midodrine 10 milliGRAM(s) Oral every 8 hours  polyethylene glycol 3350 17 Gram(s) Oral daily    MEDICATIONS  (PRN):  acetaminophen     Tablet .. 650 milliGRAM(s) Oral every 6 hours PRN Moderate Pain (4 - 6)    Currently Undergoing Physical/ Occupational Therapy at bedside.    PT/OT Functional Status Assessment:   6/3/2022      Cognitive/Neuro/Behavioral  Cognitive/Neuro/Behavioral [WDL Definition: Alert; opens eyes spontaneously; arouses to voice or touch; oriented x 4; follows commands; speech spontaneous, logical; purposeful motor response; behavior appropriate to situation]: WDL  Level of Consciousness: alert  Arousal Level: opens eyes spontaneously  Orientation: oriented x 4  Speech: clear;  spontaneous;  well paced;  logical  Mood/Behavior: calm;  cooperative;  behavior appropriate to situation    Language Assistance  Preferred Language to Address Healthcare Preferred Language to Address Healthcare: English    Therapeutic Interventions      Bed Mobility  Bed Mobility Training Rolling/Turning: supervision;  verbal cues  Bed Mobility Training Sit-to-Supine: supervision;  verbal cues  Bed Mobility Training Supine-to-Sit: supervision;  verbal cues  Bed Mobility Training Limitations: decreased strength;  impaired balance    Sit-Stand Transfer Training  Transfer Training Sit-to-Stand Transfer: minimum assist (75% patient effort);  1 person assist;  hand held assist   Transfer Training Stand-to-Sit Transfer: minimum assist (75% patient effort);  verbal cues;  hand held assist   Sit-to-Stand Transfer Training Transfer Safety Analysis: decreased balance;  decreased weight-shifting ability;  decreased strength;  impaired balance    Gait Training  Gait Training: minimum assist (75% patient effort);  verbal cues;  hand held assist ;  200 feet  Gait Analysis: 2-point gait   patient with unsteady gait, several instance of LOB when distracted;  decreased hardeep;  decreased step length;  decreased strength;  impaired balance    Therapeutic Exercise  Therapeutic Exercise Detail: Patient completed ther-ex in standing: mini squat 10x with BUE support, standing abduction 10x each LE, 1/2 romberg balance 10 seconds each LE forward (10 sec eyes open, 10 sec eyes closed), romberg eyes open, eyes closed 10 sec, SLS 10 sec each LE. Seated ther-ex: LAQ 10X each LE, eaching with LUE 10x             PM&R Impression: as above    Current Disposition Plan Recommendations:   d/c home, home PT, family assist

## 2022-06-04 NOTE — PROGRESS NOTE ADULT - SUBJECTIVE AND OBJECTIVE BOX
INTERVAL EVENTS:  -- NAEO    SUBJECTIVE:  -- reports sleeping well; walked to the bathroom this AM w/o difficulty and then got into the bedside chair and did her PT exercises; did feel a bit tired afterwards so got into bed and had a slight HA that resolved w/water  -- she denies any focal deficits or changes in sensation; does have some RUE pain from the phlebotomy   -- Review of Systems: 12 point review of systems otherwise negative    MEDICATIONS:  MEDICATIONS  (STANDING):  atorvastatin 80 milliGRAM(s) Oral daily  enoxaparin Injectable 80 milliGRAM(s) SubCutaneous every 12 hours  midodrine 10 milliGRAM(s) Oral every 8 hours  polyethylene glycol 3350 17 Gram(s) Oral daily    MEDICATIONS  (PRN):  acetaminophen     Tablet .. 650 milliGRAM(s) Oral every 6 hours PRN Moderate Pain (4 - 6)    Allergies  No Known Allergies    OBJECTIVE:  Vital Signs Last 24 Hrs  T(C): 36.8 (04 Jun 2022 09:30), Max: 36.8 (03 Jun 2022 13:00)  T(F): 98.2 (04 Jun 2022 09:30), Max: 98.2 (03 Jun 2022 13:00)  HR: 43 (04 Jun 2022 08:34) (43 - 64)  BP: 157/68 (04 Jun 2022 08:34) (122/55 - 169/73)  BP(mean): 98 (04 Jun 2022 08:34) (79 - 105)  RR: 18 (04 Jun 2022 08:34) (16 - 18)  SpO2: 100% (04 Jun 2022 08:34) (95% - 100%)  I&O's Summary    03 Jun 2022 07:01  -  04 Jun 2022 07:00  --------------------------------------------------------  IN: 455 mL / OUT: 400 mL / NET: 55 mL    PHYSICAL EXAM:  Gen: NAD, sitting upright in bed  HEENT: NCAT, MMM, clear OP  Neck: supple, trachea at midline  CV: bradycardic but regular, no m/g/r appreciated  Pulm: CTA B, no w/r/r; no increase in WOB  Abd: normoactive BS, soft, NTND  Ext: WWP, 2+ pulses x4; no c/c/e  Neuro: AOx3, CN II-XII grossly intact; nonfocal  Psych: pleasant, conversant and appropriate    LABS:                        14.0   5.67  )-----------( 285      ( 04 Jun 2022 05:33 )             42.7     06-04    140  |  109<H>  |  16  ----------------------------<  97  3.8   |  20<L>  |  0.99    Ca    9.6      04 Jun 2022 05:33  Phos  4.3     06-04  Mg     2.3     06-04    MICRODATA:  No new microdata.    RADIOLOGY/OTHER STUDIES:  No new imaging.

## 2022-06-05 LAB
ANION GAP SERPL CALC-SCNC: 12 MMOL/L — SIGNIFICANT CHANGE UP (ref 5–17)
BUN SERPL-MCNC: 19 MG/DL — SIGNIFICANT CHANGE UP (ref 7–23)
CALCIUM SERPL-MCNC: 9.7 MG/DL — SIGNIFICANT CHANGE UP (ref 8.4–10.5)
CHLORIDE SERPL-SCNC: 106 MMOL/L — SIGNIFICANT CHANGE UP (ref 96–108)
CO2 SERPL-SCNC: 21 MMOL/L — LOW (ref 22–31)
CREAT SERPL-MCNC: 1 MG/DL — SIGNIFICANT CHANGE UP (ref 0.5–1.3)
EGFR: 60 ML/MIN/1.73M2 — SIGNIFICANT CHANGE UP
GLUCOSE SERPL-MCNC: 94 MG/DL — SIGNIFICANT CHANGE UP (ref 70–99)
HCT VFR BLD CALC: 41.7 % — SIGNIFICANT CHANGE UP (ref 34.5–45)
HGB BLD-MCNC: 13.5 G/DL — SIGNIFICANT CHANGE UP (ref 11.5–15.5)
MAGNESIUM SERPL-MCNC: 2.3 MG/DL — SIGNIFICANT CHANGE UP (ref 1.6–2.6)
MCHC RBC-ENTMCNC: 29.9 PG — SIGNIFICANT CHANGE UP (ref 27–34)
MCHC RBC-ENTMCNC: 32.4 GM/DL — SIGNIFICANT CHANGE UP (ref 32–36)
MCV RBC AUTO: 92.3 FL — SIGNIFICANT CHANGE UP (ref 80–100)
NRBC # BLD: 0 /100 WBCS — SIGNIFICANT CHANGE UP (ref 0–0)
PHOSPHATE SERPL-MCNC: 3.5 MG/DL — SIGNIFICANT CHANGE UP (ref 2.5–4.5)
PLATELET # BLD AUTO: 288 K/UL — SIGNIFICANT CHANGE UP (ref 150–400)
POTASSIUM SERPL-MCNC: 3.9 MMOL/L — SIGNIFICANT CHANGE UP (ref 3.5–5.3)
POTASSIUM SERPL-SCNC: 3.9 MMOL/L — SIGNIFICANT CHANGE UP (ref 3.5–5.3)
RBC # BLD: 4.52 M/UL — SIGNIFICANT CHANGE UP (ref 3.8–5.2)
RBC # FLD: 13.9 % — SIGNIFICANT CHANGE UP (ref 10.3–14.5)
SARS-COV-2 RNA SPEC QL NAA+PROBE: SIGNIFICANT CHANGE UP
SODIUM SERPL-SCNC: 139 MMOL/L — SIGNIFICANT CHANGE UP (ref 135–145)
WBC # BLD: 4.81 K/UL — SIGNIFICANT CHANGE UP (ref 3.8–10.5)
WBC # FLD AUTO: 4.81 K/UL — SIGNIFICANT CHANGE UP (ref 3.8–10.5)

## 2022-06-05 PROCEDURE — 99233 SBSQ HOSP IP/OBS HIGH 50: CPT

## 2022-06-05 RX ADMIN — MIDODRINE HYDROCHLORIDE 10 MILLIGRAM(S): 2.5 TABLET ORAL at 05:44

## 2022-06-05 RX ADMIN — ATORVASTATIN CALCIUM 80 MILLIGRAM(S): 80 TABLET, FILM COATED ORAL at 21:05

## 2022-06-05 RX ADMIN — ENOXAPARIN SODIUM 80 MILLIGRAM(S): 100 INJECTION SUBCUTANEOUS at 18:24

## 2022-06-05 RX ADMIN — MIDODRINE HYDROCHLORIDE 10 MILLIGRAM(S): 2.5 TABLET ORAL at 21:05

## 2022-06-05 RX ADMIN — ENOXAPARIN SODIUM 80 MILLIGRAM(S): 100 INJECTION SUBCUTANEOUS at 05:44

## 2022-06-05 RX ADMIN — MIDODRINE HYDROCHLORIDE 10 MILLIGRAM(S): 2.5 TABLET ORAL at 13:02

## 2022-06-05 NOTE — PROGRESS NOTE ADULT - SUBJECTIVE AND OBJECTIVE BOX
Neurology Stroke Progress Note    INTERVAL HPI/OVERNIGHT EVENTS:  Patient seen and examined this morning. Patient denies any complaints. Pa    MEDICATIONS  (STANDING):  atorvastatin 80 milliGRAM(s) Oral daily  enoxaparin Injectable 80 milliGRAM(s) SubCutaneous every 12 hours  midodrine 10 milliGRAM(s) Oral every 8 hours  polyethylene glycol 3350 17 Gram(s) Oral daily    MEDICATIONS  (PRN):  acetaminophen     Tablet .. 650 milliGRAM(s) Oral every 6 hours PRN Moderate Pain (4 - 6)      Allergies    No Known Allergies    Intolerances        ROS: As per HPI, otherwise negative    Vital Signs Last 24 Hrs  T(C): 37.1 (05 Jun 2022 09:10), Max: 37.1 (05 Jun 2022 09:10)  T(F): 98.7 (05 Jun 2022 09:10), Max: 98.7 (05 Jun 2022 09:10)  HR: 57 (05 Jun 2022 08:17) (50 - 57)  BP: 136/69 (05 Jun 2022 08:17) (133/60 - 149/67)  BP(mean): 96 (05 Jun 2022 08:17) (86 - 97)  RR: 17 (05 Jun 2022 08:17) (16 - 18)  SpO2: 99% (05 Jun 2022 08:17) (98% - 100%)    Physical exam:  General: awake and alert, sitting comfortably, no acute distress    Neurologic:  Mental status: awake, alert, oriented to person, place, and time. Speech is fluent, able to name objects. Follows commands. Attention/concentration intact. No dysarthria, no aphasia.  Cranial nerves:   II: visual fields are full to confrontation. pupils equally round and reactive to light,   III, IV, VI: EOMI without nystagmus  V:  V1-V3 sensation intact,   VII: no facial droop, facie is symmetric with normal eye closure and smile  VIII: hearing is intact to finger rub  IX, X: Uvula is midline and soft palate rises symmetrically  XI: head turning and shoulder shrug are intact.  XII: tongue midline  Motor: Normal bulk and tone, strength 5/5 in b/l UE and LE,  strength 5/5. No pronator drift  Sensation: intact to light touch. No neglect.  Coordination: No dysmetria on finger-to-nose and heel-to-shin  Reflexes: downgoing toes bilaterally  Gait: narrow and steady, no ataxia. Romberg negative        LABS:                        13.5   4.81  )-----------( 288      ( 05 Jun 2022 07:35 )             41.7     06-05    139  |  106  |  19  ----------------------------<  94  3.9   |  21<L>  |  1.00    Ca    9.7      05 Jun 2022 07:35  Phos  3.5     06-05  Mg     2.3     06-05            RADIOLOGY & ADDITIONAL TESTS:      Assessment and Plan  71y Female    1)Secondary stroke prevention  -ASA 81 and Plavix 75  -Atorvastatin 80    2) Stroke risk factors  -    3) Further workup     DVT prophylaxis   -Heparin SQ TID and SCDs Neurology Stroke Progress Note    INTERVAL HPI/OVERNIGHT EVENTS:  Patient seen and examined this morning while sitting up in the chair. Patient denies any complaints. Patient's family at bedside. Denies headache this morning    MEDICATIONS  (STANDING):  atorvastatin 80 milliGRAM(s) Oral daily  enoxaparin Injectable 80 milliGRAM(s) SubCutaneous every 12 hours  midodrine 10 milliGRAM(s) Oral every 8 hours  polyethylene glycol 3350 17 Gram(s) Oral daily    MEDICATIONS  (PRN):  acetaminophen     Tablet .. 650 milliGRAM(s) Oral every 6 hours PRN Moderate Pain (4 - 6)      Allergies    No Known Allergies    Intolerances        ROS: As per HPI, otherwise negative    Vital Signs Last 24 Hrs  T(C): 37.1 (05 Jun 2022 09:10), Max: 37.1 (05 Jun 2022 09:10)  T(F): 98.7 (05 Jun 2022 09:10), Max: 98.7 (05 Jun 2022 09:10)  HR: 57 (05 Jun 2022 08:17) (50 - 57)  BP: 136/69 (05 Jun 2022 08:17) (133/60 - 149/67)  BP(mean): 96 (05 Jun 2022 08:17) (86 - 97)  RR: 17 (05 Jun 2022 08:17) (16 - 18)  SpO2: 99% (05 Jun 2022 08:17) (98% - 100%)    Physical exam:  General: awake and alert, sitting comfortably, no acute distress    Neurologic:  Mental status: awake, alert, oriented to person, place, and time. Speech is fluent, able to name objects. Follows commands. Attention/concentration intact. No dysarthria, no aphasia.  Cranial nerves:   II: visual fields are full to confrontation. pupils equally round and reactive to light,   III, IV, VI: EOMI without nystagmus  V:  V1-V3 sensation intact,   VII: no facial droop, face is symmetric with normal eye closure and smile  Motor: Normal bulk and tone, strength 5/5 in b/l UE and LE,  strength 5/5. No pronator drift  Sensation: intact to light touch. No neglect.  Coordination: No dysmetria on finger-to-nose and heel-to-shin    LABS:                        13.5   4.81  )-----------( 288      ( 05 Jun 2022 07:35 )             41.7     06-05    139  |  106  |  19  ----------------------------<  94  3.9   |  21<L>  |  1.00    Ca    9.7      05 Jun 2022 07:35  Phos  3.5     06-05  Mg     2.3     06-05            RADIOLOGY & ADDITIONAL TESTS:  CT Head No Cont (05.30.22 @ 15:07) >  Impression:    Interval development of a small focal hypodensity in the right insula   suspicious for acute infarction. No mass effect or hemorrhagic   transformation.    NM SPECT/CT Brain, Single Area Multi Day (06.01.22 @ 12:10) >    Impression:  Reversible defect in the distribution of the right M2   segment corresponding to an abnormality on recent CT angiographyand   perfusion study.  Slightly decreased activity in a portion of the left   cerebral hemisphere may be due to a relative steal, that is, increased   activity in the uninvolved portion of the right cerebral hemisphere   compared to the normal left.

## 2022-06-05 NOTE — PROGRESS NOTE ADULT - ASSESSMENT
70 y/o F with no pmh and recent long flight from [place] ([facility]) on Wednesday presented to [facility] on 5/29 with L hemiparesis, L facial droop and R gaze deviation. Initial NIHSS 4. Initial CTH without acute abnormality. CTP with elevated Tmax in the R M2 region. CTA head and neck with proximal occlusion of two posterior right M2. Patient improved to NIHSS 0 so no TPA was given. Repeat CTH on 5/29 with small hypodensity in right insula suspicious for acute infarct. Patient not a thrombectomy candidate. Currently awaiting MR NOVA     Neuro  #CVA workup  - q4hr stroke neuro checks and vitals  - pending MR NOVA   - Spect: reversible defect in RM2. Slight decreased activity in L cerebral hemisphere due to relative steal.   - continue Lovenox 80mg BID  - Stroke Code HCT Results: no acute abnormality --> Interval development of a small focal hypodensity in the right insula suspicious for acute infarction. No mass effect or hemorrhagic transformation.  - Stroke Code CTA Results: Proximal occlusion of two posterior right M2 branches consistent with thrombosis. Paucity of flow in the posterior right MCA distal to this level.  - Stroke education    Cards  - Goal -180  - spoke with EP about ILR - they are concerned about financial burden given patient has no insurance and going to [place] after 6 months here. Family is looking into travelers insurance and SW says patient qualifies for emergency Medicaid, however, that will only cover inpatient hospital visit. Other option is for Zio Patch; this will also not be covered by emergency Medicaid - need to have discussion with family about cost and ability to cover.   - continue midodrine 10mg q8h for orthostatic hypotension  - continue compression stockings  - maintain blood pressures >120  - TTE: no PFO, EF 65%  - LALO: no LA thrombus  - LDL results: 65  - c/w atorvastatin 80mg  - LE doppler neg  - hypercoag panel wnl    Pulm  - call provider if SPO2 < 94%    GI  #Nutrition/Fluids/Electrolytes   - replete K<4 and Mg <2  - Diet: DASH  - IVF: n/a    Renal  - daily BMP    Endocrine  - A1C results: 5.1  - TSH results: 1.04    DVT Prophylaxis  - SCDs & therapeutic lovenox    Dispo: AR     Discussed daily hospital plans and goals with patient and family at bedside.     Discussed with Neurology Attending Dr. Molina   72 y/o F with no pmh and recent long flight from [place] ([facility]) on Wednesday presented to [facility] on 5/29 with L hemiparesis, L facial droop and R gaze deviation. Initial NIHSS 4. Initial CTH without acute abnormality. CTP with elevated Tmax in the R M2 region. CTA head and neck with proximal occlusion of two posterior right M2. Patient improved to NIHSS 0 so no TPA was given. Repeat CTH on 5/29 with small hypodensity in right insula suspicious for acute infarct. Patient not a thrombectomy candidate. Currently awaiting MR NOVA     Neuro  #CVA workup  - q4hr stroke neuro checks and vitals  - pending MR NOVA   - Spect: reversible defect in RM2. Slight decreased activity in L cerebral hemisphere due to relative steal.   - continue Lovenox 80mg BID  - Stroke Code HCT Results: no acute abnormality --> Interval development of a small focal hypodensity in the right insula suspicious for acute infarction. No mass effect or hemorrhagic transformation.  - Stroke Code CTA Results: Proximal occlusion of two posterior right M2 branches consistent with thrombosis. Paucity of flow in the posterior right MCA distal to this level.  - Stroke education    Cards  - Goal -180  - spoke with EP about ILR - they are concerned about financial burden given patient has no insurance and going to [place] after 6 months here. Family is looking into travelers insurance and SW says patient qualifies for emergency Medicaid, however, that will only cover inpatient hospital visit. Other option is for Zio Patch; this will also not be covered by emergency Medicaid - need to have discussion with family about cost and ability to cover.   - continue midodrine 10mg q8h for orthostatic hypotension  - continue compression stockings  - maintain blood pressures >120  - TTE: no PFO, EF 65%  - LALO: no LA thrombus  - LDL results: 65  - c/w atorvastatin 80mg  - LE doppler neg  - hypercoag panel wnl    Pulm  - call provider if SPO2 < 94%    GI  #Nutrition/Fluids/Electrolytes   - replete K<4 and Mg <2  - Diet: DASH  - IVF: n/a    Renal  - daily BMP    Endocrine  - A1C results: 5.1  - TSH results: 1.04    DVT Prophylaxis  - SCDs & therapeutic lovenox    Dispo: AR     Discussed daily hospital plans and goals with patient and family at bedside.     Discussed with Neurology Attending Dr. Molina

## 2022-06-06 PROCEDURE — 70544 MR ANGIOGRAPHY HEAD W/O DYE: CPT | Mod: 26,59

## 2022-06-06 PROCEDURE — 70551 MRI BRAIN STEM W/O DYE: CPT | Mod: 26

## 2022-06-06 PROCEDURE — 99233 SBSQ HOSP IP/OBS HIGH 50: CPT

## 2022-06-06 RX ADMIN — Medication 650 MILLIGRAM(S): at 22:49

## 2022-06-06 RX ADMIN — MIDODRINE HYDROCHLORIDE 10 MILLIGRAM(S): 2.5 TABLET ORAL at 12:19

## 2022-06-06 RX ADMIN — ATORVASTATIN CALCIUM 80 MILLIGRAM(S): 80 TABLET, FILM COATED ORAL at 21:59

## 2022-06-06 RX ADMIN — ENOXAPARIN SODIUM 80 MILLIGRAM(S): 100 INJECTION SUBCUTANEOUS at 17:42

## 2022-06-06 RX ADMIN — MIDODRINE HYDROCHLORIDE 10 MILLIGRAM(S): 2.5 TABLET ORAL at 05:21

## 2022-06-06 RX ADMIN — MIDODRINE HYDROCHLORIDE 10 MILLIGRAM(S): 2.5 TABLET ORAL at 21:59

## 2022-06-06 RX ADMIN — ENOXAPARIN SODIUM 80 MILLIGRAM(S): 100 INJECTION SUBCUTANEOUS at 05:21

## 2022-06-06 RX ADMIN — Medication 650 MILLIGRAM(S): at 21:59

## 2022-06-06 NOTE — PROGRESS NOTE ADULT - SUBJECTIVE AND OBJECTIVE BOX
SUBJECTIVE:  Denies blurry vision, headaches, nausea, vomiting    PHYSICAL EXAM:  Gen: NAD, sitting upright in bed  HEENT: NCAT, MMM, clear OP  CV: bradycardic but regular, no m/g/r appreciated  Pulm: CTA B, no w/r/r; no increase in WOB  Neuro: AOx3, CN II-XII grossly intact; nonfocal

## 2022-06-06 NOTE — PROGRESS NOTE ADULT - SUBJECTIVE AND OBJECTIVE BOX
Neurology Stroke Progress Note    INTERVAL HPI/OVERNIGHT EVENTS:  Patient seen and examined this morning. No overnight events. States that she is feeling well. Pending MR Karyn.    MEDICATIONS  (STANDING):  atorvastatin 80 milliGRAM(s) Oral daily  enoxaparin Injectable 80 milliGRAM(s) SubCutaneous every 12 hours  midodrine 10 milliGRAM(s) Oral every 8 hours  polyethylene glycol 3350 17 Gram(s) Oral daily    MEDICATIONS  (PRN):  acetaminophen     Tablet .. 650 milliGRAM(s) Oral every 6 hours PRN Moderate Pain (4 - 6)      Allergies    No Known Allergies    Intolerances        ROS: As per HPI, otherwise negative    Vital Signs Last 24 Hrs  T(C): 36.8 (06 Jun 2022 10:14), Max: 37.2 (06 Jun 2022 05:11)  T(F): 98.3 (06 Jun 2022 10:14), Max: 98.9 (06 Jun 2022 05:11)  HR: 57 (06 Jun 2022 12:18) (51 - 63)  BP: 127/57 (06 Jun 2022 12:18) (110/54 - 136/66)  BP(mean): 82 (06 Jun 2022 12:18) (78 - 93)  RR: 18 (06 Jun 2022 12:18) (17 - 18)  SpO2: 100% (06 Jun 2022 12:18) (96% - 100%)    Physical exam:  General: awake and alert, sitting comfortably, no acute distress    Neurologic:  Mental status: awake, alert, oriented to person, place, and time. Speech is fluent, able to name objects. Follows commands. Attention/concentration intact. No dysarthria, no aphasia.  Cranial nerves:   II: visual fields are full to confrontation. pupils equally round and reactive to light,   III, IV, VI: EOMI without nystagmus  V:  V1-V3 sensation intact,   VII: no facial droop, facie is symmetric with normal eye closure and smile  VIII: hearing is intact to finger rub  IX, X: Uvula is midline and soft palate rises symmetrically  XI: head turning and shoulder shrug are intact.  XII: tongue midline  Motor: Normal bulk and tone, strength 5/5 in b/l UE and LE,  strength 5/5. No pronator drift  Sensation: intact to light touch. No neglect.  Coordination: No dysmetria on finger-to-nose and heel-to-shin      LABS:                        13.5   4.81  )-----------( 288      ( 05 Jun 2022 07:35 )             41.7     06-05    139  |  106  |  19  ----------------------------<  94  3.9   |  21<L>  |  1.00    Ca    9.7      05 Jun 2022 07:35  Phos  3.5     06-05  Mg     2.3     06-05      RADIOLOGY & ADDITIONAL TESTS:  CT Head No Cont (05.30.22 @ 15:07) >  Impression:    Interval development of a small focal hypodensity in the right insula   suspicious for acute infarction. No mass effect or hemorrhagic   transformation.    NM SPECT/CT Brain, Single Area Multi Day (06.01.22 @ 12:10) >    Impression:  Reversible defect in the distribution of the right M2   segment corresponding to an abnormality on recent CT angiographyand   perfusion study.  Slightly decreased activity in a portion of the left   cerebral hemisphere may be due to a relative steal, that is, increased   activity in the uninvolved portion of the right cerebral hemisphere   compared to the normal left.

## 2022-06-06 NOTE — PROGRESS NOTE ADULT - ASSESSMENT
70 y/o F with no pmh and recent long flight from [place] ([facility]) on Wednesday presented to [facility] on 5/29 with L hemiparesis, L facial droop and R gaze deviation. Initial NIHSS 4. Initial CTH without acute abnormality. CTP with elevated Tmax in the R M2 region. CTA head and neck with proximal occlusion of two posterior right M2. Patient improved to NIHSS 0 so no TPA was given. Repeat CTH on 5/29 with small hypodensity in right insula suspicious for acute infarct. Patient not a thrombectomy candidate. Currently awaiting MR NOVA     Neuro  #CVA workup  - q4hr stroke neuro checks and vitals  - pending MR NOVA   - Spect: reversible defect in RM2. Slight decreased activity in L cerebral hemisphere due to relative steal.   - continue Lovenox 80mg BID for now, dependent on the MR NOVA results may consider switching to DAPT  - Stroke Code HCT Results: no acute abnormality --> Interval development of a small focal hypodensity in the right insula suspicious for acute infarction. No mass effect or hemorrhagic transformation.  - Stroke Code CTA Results: Proximal occlusion of two posterior right M2 branches consistent with thrombosis. Paucity of flow in the posterior right MCA distal to this level.  - Stroke education    Cards  - Goal -180  - spoke with EP about ILR - they are concerned about financial burden given patient has no insurance and going to [place] after 6 months here. Family is looking into travelers insurance and SW says patient qualifies for emergency Medicaid, however, that will only cover inpatient hospital visit. Other option is for Zio Patch; this will also not be covered by emergency Medicaid - need to have discussion with family about cost and ability to cover.   - continue midodrine 10mg q8h for orthostatic hypotension  - continue compression stockings  - maintain blood pressures >120  - TTE: no PFO, EF 65%  - LALO: no LA thrombus  - LDL results: 65  - c/w atorvastatin 80mg  - LE doppler neg  - hypercoag panel wnl    Pulm  - call provider if SPO2 < 94%    GI  #Nutrition/Fluids/Electrolytes   - replete K<4 and Mg <2  - Diet: DASH  - IVF: n/a    Renal  - daily BMP    Endocrine  - A1C results: 5.1  - TSH results: 1.04    DVT Prophylaxis  - SCDs & therapeutic lovenox    Dispo: [place] vs. home      Discussed daily hospital plans and goals with patient and family at bedside.     Discussed with Neurology Attending Dr. Schwab

## 2022-06-06 NOTE — PROGRESS NOTE ADULT - ASSESSMENT
This is a 72yo woman with no PMH who presented after a flight from [facility] w/acute onset L-sided hemiparesis, found to have proximal occlusion of two posterior right M2 branches consistent with thrombosis (not a candidate for thrombectomy).  Hospital course notable for asymptomatic bradycardia and asymptomatic hypotension.  Waiting for MR-NOVA.     #R-MCA Thrombosis   -- MR-NOVA pending  -- continue therapeutic Lovenox BID    -- Midodrine per primary team; on 10mg q8h  -- c/w statin   -- ILR considered but given cost burden her primary team is investigating alternative monitoring      #Asymptomatic Hypotension  -- BP stable, Midodrine as per above     #Asymptomatic Sinus Bradycardia, resolved  -- LALO unremarkable other than LVH  -- ECGs reviewed, baseline    #Uninsured  #Foreign traveler  -- visiting from [facility] and has no insurance  -- SW, RE: demario meds  -- plans to be here until October     #Diet - Regular  #DVT PPx - Lovenox as per above  #Dispo - TBD    Vee Quintanilla  Attending Hospitalist  [phone] This is a 72yo woman with no PMH who presented after a flight from [facility] w/acute onset L-sided hemiparesis, found to have proximal occlusion of two posterior right M2 branches consistent with thrombosis (not a candidate for thrombectomy).  Hospital course notable for asymptomatic bradycardia and asymptomatic hypotension.  Waiting for MR-NOVA.     #R-MCA Thrombosis   -- MR-NOVA pending  -- continue therapeutic Lovenox BID    -- Midodrine per primary team; on 10mg q8h  -- c/w statin   -- ILR considered but given cost burden her primary team is investigating alternative monitoring      #Asymptomatic Hypotension  -- BP stable, Midodrine as per above     #Asymptomatic Sinus Bradycardia, resolved  -- LALO unremarkable other than LVH  -- ECGs reviewed, baseline    #Uninsured  #Foreign traveler  -- visiting from [facility] and has no insurance  -- TREVON, RE: demario meds  -- plans to be here until October     #Diet - Regular  #DVT PPx - Lovenox as per above  #Dispo - TBD

## 2022-06-07 LAB
ANION GAP SERPL CALC-SCNC: 11 MMOL/L — SIGNIFICANT CHANGE UP (ref 5–17)
BUN SERPL-MCNC: 17 MG/DL — SIGNIFICANT CHANGE UP (ref 7–23)
CALCIUM SERPL-MCNC: 9.6 MG/DL — SIGNIFICANT CHANGE UP (ref 8.4–10.5)
CHLORIDE SERPL-SCNC: 108 MMOL/L — SIGNIFICANT CHANGE UP (ref 96–108)
CO2 SERPL-SCNC: 23 MMOL/L — SIGNIFICANT CHANGE UP (ref 22–31)
CREAT SERPL-MCNC: 0.89 MG/DL — SIGNIFICANT CHANGE UP (ref 0.5–1.3)
EGFR: 69 ML/MIN/1.73M2 — SIGNIFICANT CHANGE UP
GLUCOSE SERPL-MCNC: 91 MG/DL — SIGNIFICANT CHANGE UP (ref 70–99)
HCT VFR BLD CALC: 41.4 % — SIGNIFICANT CHANGE UP (ref 34.5–45)
HGB BLD-MCNC: 13.4 G/DL — SIGNIFICANT CHANGE UP (ref 11.5–15.5)
MAGNESIUM SERPL-MCNC: 2.2 MG/DL — SIGNIFICANT CHANGE UP (ref 1.6–2.6)
MCHC RBC-ENTMCNC: 29.9 PG — SIGNIFICANT CHANGE UP (ref 27–34)
MCHC RBC-ENTMCNC: 32.4 GM/DL — SIGNIFICANT CHANGE UP (ref 32–36)
MCV RBC AUTO: 92.4 FL — SIGNIFICANT CHANGE UP (ref 80–100)
NRBC # BLD: 0 /100 WBCS — SIGNIFICANT CHANGE UP (ref 0–0)
PHOSPHATE SERPL-MCNC: 4 MG/DL — SIGNIFICANT CHANGE UP (ref 2.5–4.5)
PLATELET # BLD AUTO: 223 K/UL — SIGNIFICANT CHANGE UP (ref 150–400)
POTASSIUM SERPL-MCNC: 4.1 MMOL/L — SIGNIFICANT CHANGE UP (ref 3.5–5.3)
POTASSIUM SERPL-SCNC: 4.1 MMOL/L — SIGNIFICANT CHANGE UP (ref 3.5–5.3)
RBC # BLD: 4.48 M/UL — SIGNIFICANT CHANGE UP (ref 3.8–5.2)
RBC # FLD: 14 % — SIGNIFICANT CHANGE UP (ref 10.3–14.5)
SODIUM SERPL-SCNC: 142 MMOL/L — SIGNIFICANT CHANGE UP (ref 135–145)
WBC # BLD: 4.48 K/UL — SIGNIFICANT CHANGE UP (ref 3.8–10.5)
WBC # FLD AUTO: 4.48 K/UL — SIGNIFICANT CHANGE UP (ref 3.8–10.5)

## 2022-06-07 PROCEDURE — 99233 SBSQ HOSP IP/OBS HIGH 50: CPT

## 2022-06-07 RX ORDER — MIDODRINE HYDROCHLORIDE 2.5 MG/1
5 TABLET ORAL EVERY 8 HOURS
Refills: 0 | Status: DISCONTINUED | OUTPATIENT
Start: 2022-06-07 | End: 2022-06-08

## 2022-06-07 RX ADMIN — MIDODRINE HYDROCHLORIDE 10 MILLIGRAM(S): 2.5 TABLET ORAL at 06:29

## 2022-06-07 RX ADMIN — POLYETHYLENE GLYCOL 3350 17 GRAM(S): 17 POWDER, FOR SOLUTION ORAL at 13:03

## 2022-06-07 RX ADMIN — ENOXAPARIN SODIUM 80 MILLIGRAM(S): 100 INJECTION SUBCUTANEOUS at 06:30

## 2022-06-07 RX ADMIN — ATORVASTATIN CALCIUM 80 MILLIGRAM(S): 80 TABLET, FILM COATED ORAL at 21:16

## 2022-06-07 RX ADMIN — MIDODRINE HYDROCHLORIDE 5 MILLIGRAM(S): 2.5 TABLET ORAL at 21:16

## 2022-06-07 RX ADMIN — MIDODRINE HYDROCHLORIDE 10 MILLIGRAM(S): 2.5 TABLET ORAL at 13:04

## 2022-06-07 RX ADMIN — ENOXAPARIN SODIUM 80 MILLIGRAM(S): 100 INJECTION SUBCUTANEOUS at 17:06

## 2022-06-07 NOTE — PROGRESS NOTE ADULT - SUBJECTIVE AND OBJECTIVE BOX
Neurology Stroke Progress Note    INTERVAL HPI/OVERNIGHT EVENTS:  Patient seen and examined.     MEDICATIONS  (STANDING):  atorvastatin 80 milliGRAM(s) Oral daily  enoxaparin Injectable 80 milliGRAM(s) SubCutaneous every 12 hours  midodrine 10 milliGRAM(s) Oral every 8 hours  polyethylene glycol 3350 17 Gram(s) Oral daily    MEDICATIONS  (PRN):  acetaminophen     Tablet .. 650 milliGRAM(s) Oral every 6 hours PRN Moderate Pain (4 - 6)      Allergies    No Known Allergies    Intolerances        ROS: As per HPI, otherwise negative    Vital Signs Last 24 Hrs  T(C): 36.8 (07 Jun 2022 04:06), Max: 37.1 (06 Jun 2022 14:11)  T(F): 98.2 (07 Jun 2022 04:06), Max: 98.7 (06 Jun 2022 14:11)  HR: 62 (07 Jun 2022 08:37) (52 - 92)  BP: 150/78 (07 Jun 2022 08:37) (123/65 - 160/68)  BP(mean): 99 (07 Jun 2022 08:37) (82 - 100)  RR: 17 (07 Jun 2022 08:37) (15 - 18)  SpO2: 94% (07 Jun 2022 08:37) (94% - 100%)    Physical exam:      LABS:                        13.4   4.48  )-----------( 223      ( 07 Jun 2022 05:30 )             41.4     06-07    142  |  108  |  17  ----------------------------<  91  4.1   |  23  |  0.89    Ca    9.6      07 Jun 2022 05:30  Phos  4.0     06-07  Mg     2.2     06-07            RADIOLOGY & ADDITIONAL TESTS:      Assessment and Plan  71y Female      Stroke etiology: likely Neurology Stroke Progress Note    INTERVAL HPI/OVERNIGHT EVENTS:  Patient seen and examined.     MEDICATIONS  (STANDING):  atorvastatin 80 milliGRAM(s) Oral daily  enoxaparin Injectable 80 milliGRAM(s) SubCutaneous every 12 hours  midodrine 10 milliGRAM(s) Oral every 8 hours  polyethylene glycol 3350 17 Gram(s) Oral daily    MEDICATIONS  (PRN):  acetaminophen     Tablet .. 650 milliGRAM(s) Oral every 6 hours PRN Moderate Pain (4 - 6)      Allergies    No Known Allergies    Intolerances        ROS: As per HPI, otherwise negative    Vital Signs Last 24 Hrs  T(C): 36.8 (07 Jun 2022 04:06), Max: 37.1 (06 Jun 2022 14:11)  T(F): 98.2 (07 Jun 2022 04:06), Max: 98.7 (06 Jun 2022 14:11)  HR: 62 (07 Jun 2022 08:37) (52 - 92)  BP: 150/78 (07 Jun 2022 08:37) (123/65 - 160/68)  BP(mean): 99 (07 Jun 2022 08:37) (82 - 100)  RR: 17 (07 Jun 2022 08:37) (15 - 18)  SpO2: 94% (07 Jun 2022 08:37) (94% - 100%)    Physical exam:  Physical exam:  General: No acute distress, awake and alert  Eyes: Anicteric sclerae, moist conjunctivae, see below for CNs  Neck: trachea midline  Cardiovascular: Bradycardic on monitor  Pulmonary: No use of accessory muscles  GI: Abdomen soft, non-distended, non-tender  Extremities: Radial and DP pulses +2, no edema    Neurologic:  -Mental status: Awake, alert, oriented to person, place, and time. Speech is fluent with intact naming, repetition, and comprehension, no dysarthria. Recent and remote memory intact. Follows commands. Attention/concentration intact. Fund of knowledge appropriate.  -Cranial nerves:   II: Visual fields are full to confrontation.  III, IV, VI: Extraocular movements are intact without nystagmus. Pupils equally round and reactive to light  VII: Face is symmetric with normal eye closure and smile  Motor: Normal bulk and tone. No pronator drift. Strength bilateral upper extremity 5/5, bilateral lower extremities 5/5.  Sensation: Intact to light touch bilaterally. No neglect or extinction on double simultaneous testing.  Coordination: No dysmetria on finger-to-nose and heel-to-shin bilaterally    LABS:                        13.4   4.48  )-----------( 223      ( 07 Jun 2022 05:30 )             41.4     06-07    142  |  108  |  17  ----------------------------<  91  4.1   |  23  |  0.89    Ca    9.6      07 Jun 2022 05:30  Phos  4.0     06-07  Mg     2.2     06-07            RADIOLOGY & ADDITIONAL TESTS:      Assessment and Plan  71y Female      Stroke etiology: likely Neurology Stroke Progress Note    INTERVAL HPI/OVERNIGHT EVENTS:  Patient seen and examined. She is pending results of the MR NOVA. BRIAN overnight.    MEDICATIONS  (STANDING):  atorvastatin 80 milliGRAM(s) Oral daily  enoxaparin Injectable 80 milliGRAM(s) SubCutaneous every 12 hours  midodrine 10 milliGRAM(s) Oral every 8 hours  polyethylene glycol 3350 17 Gram(s) Oral daily    MEDICATIONS  (PRN):  acetaminophen     Tablet .. 650 milliGRAM(s) Oral every 6 hours PRN Moderate Pain (4 - 6)      Allergies    No Known Allergies    Intolerances        ROS: As per HPI, otherwise negative    Vital Signs Last 24 Hrs  T(C): 36.8 (07 Jun 2022 04:06), Max: 37.1 (06 Jun 2022 14:11)  T(F): 98.2 (07 Jun 2022 04:06), Max: 98.7 (06 Jun 2022 14:11)  HR: 62 (07 Jun 2022 08:37) (52 - 92)  BP: 150/78 (07 Jun 2022 08:37) (123/65 - 160/68)  BP(mean): 99 (07 Jun 2022 08:37) (82 - 100)  RR: 17 (07 Jun 2022 08:37) (15 - 18)  SpO2: 94% (07 Jun 2022 08:37) (94% - 100%)    Physical exam:  General: No acute distress, awake and alert  Eyes: Anicteric sclerae, moist conjunctivae, see below for CNs  Neck: trachea midline  Cardiovascular: Bradycardic on monitor  Pulmonary: No use of accessory muscles  GI: Abdomen soft, non-distended, non-tender  Extremities: Radial and DP pulses +2, no edema    Neurologic:  -Mental status: Awake, alert, oriented to person, place, and time. Speech is fluent with intact naming, repetition, and comprehension, no dysarthria. Recent and remote memory intact. Follows commands. Attention/concentration intact. Fund of knowledge appropriate.  -Cranial nerves:   II: Visual fields are full to confrontation.  III, IV, VI: Extraocular movements are intact without nystagmus. Pupils equally round and reactive to light  VII: Face is symmetric with normal eye closure and smile  Motor: Normal bulk and tone. No pronator drift. Strength bilateral upper extremity 5/5, bilateral lower extremities 5/5.  Sensation: Intact to light touch bilaterally. No neglect or extinction on double simultaneous testing.  Coordination: No dysmetria on finger-to-nose and heel-to-shin bilaterally    LABS:                        13.4   4.48  )-----------( 223      ( 07 Jun 2022 05:30 )             41.4     06-07    142  |  108  |  17  ----------------------------<  91  4.1   |  23  |  0.89    Ca    9.6      07 Jun 2022 05:30  Phos  4.0     06-07  Mg     2.2     06-07            RADIOLOGY & ADDITIONAL TESTS:      Assessment and Plan  71y Female      Stroke etiology: likely

## 2022-06-07 NOTE — PROGRESS NOTE ADULT - ASSESSMENT
72 y/o F with no pmh and recent long flight from [place] ([facility]) on Wednesday presented to [facility] on 5/29 with L hemiparesis, L facial droop and R gaze deviation. Initial NIHSS 4. Initial CTH without acute abnormality. CTP with elevated Tmax in the R M2 region. CTA head and neck with proximal occlusion of two posterior right M2. Patient improved to NIHSS 0 so no TPA was given. Repeat CTH on 5/29 with small hypodensity in right insula suspicious for acute infarct. Patient not a thrombectomy candidate. Currently awaiting MR NOVA     Neuro  #CVA workup  - q4hr stroke neuro checks and vitals  - pending MR NOVA   - Spect: reversible defect in RM2. Slight decreased activity in L cerebral hemisphere due to relative steal.   - continue Lovenox 80mg BID for now, dependent on the MR NOVA results may consider switching to DAPT  - Stroke Code HCT Results: no acute abnormality --> Interval development of a small focal hypodensity in the right insula suspicious for acute infarction. No mass effect or hemorrhagic transformation.  - Stroke Code CTA Results: Proximal occlusion of two posterior right M2 branches consistent with thrombosis. Paucity of flow in the posterior right MCA distal to this level.  - Stroke education    Cards  - Goal -180  - spoke with EP about ILR - they are concerned about financial burden given patient has no insurance and going to [place] after 6 months here. Family is looking into travelers insurance and SW says patient qualifies for emergency Medicaid, however, that will only cover inpatient hospital visit. Other option is for Zio Patch; this will also not be covered by emergency Medicaid - need to have discussion with family about cost and ability to cover.   - continue midodrine 10mg q8h for orthostatic hypotension  - continue compression stockings  - maintain blood pressures >120  - TTE: no PFO, EF 65%  - LALO: no LA thrombus  - LDL results: 65  - c/w atorvastatin 80mg  - LE doppler neg  - hypercoag panel wnl    Pulm  - call provider if SPO2 < 94%    GI  #Nutrition/Fluids/Electrolytes   - replete K<4 and Mg <2  - Diet: DASH  - IVF: n/a    Renal  - daily BMP    Endocrine  - A1C results: 5.1  - TSH results: 1.04    DVT Prophylaxis  - SCDs & therapeutic lovenox    Dispo: [place] vs. home      Discussed daily hospital plans and goals with patient and family at bedside.     Discussed with Neurology Attending Dr. Schwab

## 2022-06-07 NOTE — PROGRESS NOTE ADULT - ASSESSMENT
per Neurology    72 y/o F with no pmh and recent long flight from [place] ([facility]) on Wednesday presented to [facility] on 5/29 with L hemiparesis, L facial droop and R gaze deviation. Initial NIHSS 4. Initial CTH without acute abnormality. CTP with elevated Tmax in the R M2 region. CTA head and neck with proximal occlusion of two posterior right M2. Patient improved to NIHSS 0 so no TPA was given. Repeat CTH on 5/29 with small hypodensity in right insula suspicious for acute infarct. Patient not a thrombectomy candidate. Currently awaiting MR NOVA     Neuro  #CVA workup  - q4hr stroke neuro checks and vitals  - pending MR NOVA   - Spect: reversible defect in RM2. Slight decreased activity in L cerebral hemisphere due to relative steal.   - continue Lovenox 80mg BID for now, dependent on the MR NOVA results may consider switching to DAPT  - Stroke Code HCT Results: no acute abnormality --> Interval development of a small focal hypodensity in the right insula suspicious for acute infarction. No mass effect or hemorrhagic transformation.  - Stroke Code CTA Results: Proximal occlusion of two posterior right M2 branches consistent with thrombosis. Paucity of flow in the posterior right MCA distal to this level.  - Stroke education    Cards  - Goal -180  - spoke with EP about ILR - they are concerned about financial burden given patient has no insurance and going to [place] after 6 months here. Family is looking into travelers insurance and SW says patient qualifies for emergency Medicaid, however, that will only cover inpatient hospital visit. Other option is for Zio Patch; this will also not be covered by emergency Medicaid - need to have discussion with family about cost and ability to cover.   - continue midodrine 10mg q8h for orthostatic hypotension  - continue compression stockings  - maintain blood pressures >120  - TTE: no PFO, EF 65%  - LALO: no LA thrombus  - LDL results: 65  - c/w atorvastatin 80mg  - LE doppler neg  - hypercoag panel wnl    Pulm  - call provider if SPO2 < 94%    GI  #Nutrition/Fluids/Electrolytes   - replete K<4 and Mg <2  - Diet: DASH  - IVF: n/a    Renal  - daily BMP    Endocrine  - A1C results: 5.1  - TSH results: 1.04    DVT Prophylaxis  - SCDs & therapeutic lovenox

## 2022-06-07 NOTE — PROGRESS NOTE ADULT - SUBJECTIVE AND OBJECTIVE BOX
Physical Medicine and Rehabilitation Progress Note:    Patient is a 71y old  Female who presents with a chief complaint of R MCA infarct (03 Jun 2022 14:53)      HPI:  HPI: 71y Female with no past medical history, not on any meds, just got off a long flight from [place] this past Wednesday presents with left sided hemiparesis. LKW 2:30 PM today when she was with son (Axel [phone], at bedside). Then around 4pm, family found patient with profound left sided weakness. EMS was called. BP with /100. Per EMS, no movement in the left arm nor leg, left facial droop, and right gaze preference and could not overcome.     On presentation to ED, 163/83 with improving exam. AOx3, answering questions and following commands. Right gaze preference and can easily overcome when attention brought to the left side. LUE with drift, does not hit bed. Minor left facial droop (although son says patient's face at baseline), extinguishes to DST on the left. NIHSS 4. CTH with no hemorrhage. CTP with elevated Tmax in the R M2 region. CTA head and neck with proximal occlusion of two posterior right M2. Patient is a tpa candidate as CTH is negative for hemorrhage, within window and although improving exam, with disabling deficits. While mixing tpa, patient exam improved to an NIHSS 0. Drift no longer present, gaze midline and attending bilaterally without preference, sensation intact b/l without extinguishing on DST. Decision made to not give tpa due to return to baseline. Endovascular neurosurgery consulted regarding RM2 occlusion. Patient currently not a thrombectomy candidate due to NIHSS 0 with no focal deficits.     Patient denies CP, SOB, prior episodes of weakness and numbness. She has b/l LE flushing and "heat" occasionally, but never weakness, numbness nor paresthesia.     T(C): --  HR: 59 (05-29-22 @ 17:30) (59 - 59)  BP: 163/83 (05-29-22 @ 17:30) (163/83 - 163/83)  RR: 18 (05-29-22 @ 17:30) (18 - 18)  SpO2: 98% (05-29-22 @ 17:30) (98% - 98%)    PAST MEDICAL & SURGICAL HISTORY:      FAMILY HISTORY:      SOCIAL HISTORY:   Patient currently visiting from [place], staying with son.   Smoking status:  Drinking:  Drug Use:     ROS:   Constitutional: No fever, weight loss or fatigue  Eyes: No eye pain, visual disturbances, or discharge  ENMT:  No difficulty hearing, tinnitus; No sinus or throat pain  Neck: No pain or stiffness  Respiratory: No cough, wheezing, chills or hemoptysis  Cardiovascular: No chest pain, palpitations, shortness of breath, or leg swelling  Gastrointestinal: No abdominal pain. No nausea, vomiting or hematemesis; No diarrhea or constipation. Nohematochezia.  Genitourinary: No dysuria, frequency, hematuria or incontinence  Neurological: As per HPI    MEDICATIONS  (STANDING):  dextrose 50% Injectable 25 milliLiter(s) IV Push Once    MEDICATIONS  (PRN):    Allergies    No Known Allergies    Intolerances      Vital Signs Last 24 Hrs  T(C): --  T(F): --  HR: 59 (29 May 2022 17:30) (59 - 59)  BP: 163/83 (29 May 2022 17:30) (163/83 - 163/83)  BP(mean): --  RR: 18 (29 May 2022 17:30) (18 - 18)  SpO2: 98% (29 May 2022 17:30) (98% - 98%)    Physical exam:  Constitutional: No acute distress, conversant  Eyes: Anicteric sclerae, moist conjunctivae, see below for CNs  Neck: trachea midline, FROM, supple, no thyromegaly or lymphadenopathy  Cardiovascular: Some PVCs on tele, no afib.   Pulmonary: No increased work of breathing. No use of accessory muscles  GI: Abdomen soft, non-distended, non-tender  Extremities: no edema    Neurologic: on immediate presentation to the ED  -Mental status: Awake, alert, oriented to person, place, and time. Speech is fluent with intact naming, repetition, and comprehension, no dysarthria. Recent and remote memory intact. Follows commands. Attention/concentration intact. Fund of knowledge appropriate.  -Cranial nerves:   II: Visual fields are full to confrontation.  III, IV, VI: Extraocular movements are intact without nystagmus. RIght gaze preference but can cross and bury to the left. Can attend to the left easily when attention brought to that side. Pupils equally round and reactive to light  V:  Facial sensation V1-V3 equal and intact   VII: Mild left facial droop   VIII: Hearing is grossly intact.   Motor: Normal bulk and tone. LUE drift, does not hit bed. All other extremities antigravity without drift.   Sensation: Intact to light touch bilaterally. Extinguishes on the left with DST  Coordination: No dysmetria on finger-to-nose bilaterally  NIHSS: 4      Neurologic: after CT scans, in resus room  -Mental status: Awake, alert, oriented to person, place, and time. Speech is fluent with intact naming, repetition, and comprehension, no dysarthria. Recent and remote memory intact. Follows commands. Attention/concentration intact. Fund of knowledge appropriate.  -Cranial nerves:   II: Visual fields are full to confrontation.  III, IV, VI: Extraocular movements are intact without nystagmus. Pupils equally round and reactive to light  V:  Facial sensation V1-V3 equal and intact   VII: Face is symmetric with normal eye closure and smile  VIII: Hearing is grossly intact bilaterally  XII: Tongue protrudes midline  Motor: Normal bulk and tone. No pronator drift. Strength bilateral upper extremity 5/5, bilateral lower extremities 5/5.  Sensation: Intact to light touch bilaterally. No neglect or extinction on double simultaneous testing.  Coordination: No dysmetria on finger-to-nose bilaterally  NIHSS: 0        Fingerstick Blood Glucose: CAPILLARY BLOOD GLUCOSE  78 (29 May 2022 18:13)      POCT Blood Glucose.: 78 mg/dL (29 May 2022 17:31)    LABS:                        14.3   6.23  )-----------( 345      ( 29 May 2022 17:36 )             44.1     05-29    142  |  105  |  17  ----------------------------<  85  4.0   |  27  |  1.17    Ca    9.8      29 May 2022 17:36    TPro  7.8  /  Alb  4.5  /  TBili  0.5  /  DBili  x   /  AST  24  /  ALT  17  /  AlkPhos  99  05-29    PT/INR - ( 29 May 2022 17:36 )   PT: 13.1 sec;   INR: 1.10          PTT - ( 29 May 2022 17:36 )  PTT:29.1 sec  CARDIAC MARKERS ( 29 May 2022 17:36 )  x     / 0.01 ng/mL / x     / x     / x              RADIOLOGY & ADDITIONAL STUDIES:    < from: CT Brain Stroke Protocol (05.29.22 @ 17:43) >  IMPRESSION:  1. No acute intracranial abnormality.  2. ASPECTS ([place] Stroke Program Early CT Score) is 10.    < end of copied text >  < from: CT Perfusion w/ Maps w/ IV Cont (05.29.22 @ 17:43) >  IMPRESSION:  1. Moderate size area of right MCA distribution acute ischemia in the  distribution of the M2 branch occlusions.  2. No CT perfusion evidence of acute infarction.    < end of copied text >  < from: CT Angio Head w/ IV Cont (05.29.22 @ 17:43) >  IMPRESSION:  1. Proximal occlusion of two posterior right M2 branches consistent with  thrombosis. Paucity of flow in the posterior right MCA distal to this   level.  2. Otherwise patent intracranial arterial system.    < end of copied text >  < from: CT Angio Neck w/ IV Cont (05.29.22 @ 17:43) >  IMPRESSION:  No stenosis or occlusion.    < end of copied text >    -----------------------------------------------------------------------------------------------------------------  IV-tPA (Y/N):    N                              Bolus time:    Alteplase Dose Verification w/ RN:  Reason IV-tPA not given: rapidly improving exam, back to baseline (29 May 2022 22:54)                            13.4   4.48  )-----------( 223      ( 07 Jun 2022 05:30 )             41.4       06-07    142  |  108  |  17  ----------------------------<  91  4.1   |  23  |  0.89    Ca    9.6      07 Jun 2022 05:30  Phos  4.0     06-07  Mg     2.2     06-07      Vital Signs Last 24 Hrs  T(C): 36.8 (07 Jun 2022 13:25), Max: 37.1 (06 Jun 2022 14:11)  T(F): 98.2 (07 Jun 2022 13:25), Max: 98.7 (06 Jun 2022 14:11)  HR: 55 (07 Jun 2022 13:02) (52 - 92)  BP: 124/60 (07 Jun 2022 13:02) (123/65 - 157/70)  BP(mean): 85 (07 Jun 2022 13:02) (85 - 100)  RR: 17 (07 Jun 2022 13:02) (15 - 17)  SpO2: 100% (07 Jun 2022 13:02) (94% - 100%)    MEDICATIONS  (STANDING):  atorvastatin 80 milliGRAM(s) Oral daily  enoxaparin Injectable 80 milliGRAM(s) SubCutaneous every 12 hours  midodrine 10 milliGRAM(s) Oral every 8 hours  polyethylene glycol 3350 17 Gram(s) Oral daily    MEDICATIONS  (PRN):  acetaminophen     Tablet .. 650 milliGRAM(s) Oral every 6 hours PRN Moderate Pain (4 - 6)    Currently Undergoing Physical/ Occupational Therapy at bedside.    PT/OT Functional Status Assessment:       Cognitive/Neuro/Behavioral  Cognitive/Neuro/Behavioral [WDL Definition: Alert; opens eyes spontaneously; arouses to voice or touch; oriented x 4; follows commands; speech spontaneous, logical; purposeful motor response; behavior appropriate to situation]: WDL    Language Assistance  Preferred Language to Address Healthcare Preferred Language to Address Healthcare: English    Therapeutic Interventions      Bed Mobility  Bed Mobility Training Rolling/Turning: independent;  HOB flat  Bed Mobility Training Scooting: independent  Bed Mobility Training Sit-to-Supine: independent;  HOB flat  Bed Mobility Training Supine-to-Sit: independent;  HOB flat    Sit-Stand Transfer Training  Transfer Training Sit-to-Stand Transfer: independent  Transfer Training Stand-to-Sit Transfer: independent    Toilet Transfer Training  Transfer Training Toilet Transfer: independent    Therapeutic Exercise  Therapeutic Exercise Detail: Pt able to make her bed independently, no LOB. Pt ambulated 80 ft independently, without LOB.     Lower Body Dressing Training  Lower Body Dressing Training Assistance: independent    Grooming Training  Grooming Training Assistance: independent;  washing hands at sink            PM&R Impression: as above    Current Disposition Plan Recommendations:   d/c home , home PT/OT , family asssist

## 2022-06-07 NOTE — PROGRESS NOTE ADULT - SUBJECTIVE AND OBJECTIVE BOX
HISTORY OF PRESENT ILLNESS:   71y Female with no past medical history, not on any meds, just got off a long flight from [place] this past Wednesday presents with left sided hemiparesis. LKW 2:30 PM today when she was with son (Axel [phone], at bedside). Then around 4pm, family found patient with profound left sided weakness. EMS was called. BP with /100. Per EMS, no movement in the left arm nor leg, left facial droop, and right gaze preference and could not overcome.     On presentation to ED, 163/83 with improving exam. AOx3, answering questions and following commands. Right gaze preference and can easily overcome when attention brought to the left side. LUE with drift, does not hit bed. Minor left facial droop (although son says patient's face at baseline), extinguishes to DST on the left. NIHSS 4. CTH with no hemorrhage. CTP with elevated Tmax in the R M2 region. CTA head and neck with proximal occlusion of two posterior right M2. Patient is a tpa candidate as CTH is negative for hemorrhage, within window and although improving exam, with disabling deficits. While mixing tpa, patient exam improved to an NIHSS 0. Drift no longer present, gaze midline and attending bilaterally without preference, sensation intact b/l without extinguishing on DST. Decision made to not give tpa due to return to baseline. Endovascular neurosurgery consulted regarding RM2 occlusion. Patient currently not a thrombectomy candidate due to NIHSS 0 with no focal deficits.     Patient denies CP, SOB, prior episodes of weakness and numbness. She has b/l LE flushing and "heat" occasionally, but never weakness, numbness nor paresthesia.   PAST MEDICAL & SURGICAL HISTORY:  No pertinent past medical history      No significant past surgical history      Allergies    No Known Allergies    Intolerances        REVIEW OF SYSTEMS:  General:	no recent illnesses, no recent wt gain/loss, no chills  Skin/Breast:  no rash, lumps, new moles, erythema, tenderness  Ophthalmologic:  no change in vision, diplopia, pain, redness, tearing, dry eyes	  ENMT:	no hearing loss, tinnitus, ear pain, vertigo, nasal congestion, epistaxis, sore throat  Respiratory and Thorax: no coughing, wheezing, recent URI, shortness of breath	  Cardiovascular: no chest pain, JEREZ, leg swelling, irregular rhythm   Gastrointestinal:	no abd pain, nausea, vomiting, diarrhea, constipation, bloody stool, heartburn  Genitourinary: no frequency, dysuria, hematuria  Musculoskeletal:	no joint pain, no joint swelling, no tenderness  Neurological:	 see HPI  Psychiatric:	no confusion, no anxiousness, no depression   Hematology/Lymphatics:	no brusing, easy bleeding, LAD  Endocrine:  	no excess urination/thirst, heat/cold intolerance  Allergic/Immunologic:  no urticaria, sneezing, recurrent infections      MEDICATIONS:  Antibiotics:    Neuro:  acetaminophen     Tablet .. 650 milliGRAM(s) Oral every 6 hours PRN    Anticoagulation:  enoxaparin Injectable 80 milliGRAM(s) SubCutaneous every 12 hours    OTHER:  atorvastatin 80 milliGRAM(s) Oral daily  midodrine 5 milliGRAM(s) Oral every 8 hours  polyethylene glycol 3350 17 Gram(s) Oral daily    IVF:      Vital Signs Last 24 Hrs  T(C): 36.8 (07 Jun 2022 21:54), Max: 36.9 (07 Jun 2022 17:49)  T(F): 98.2 (07 Jun 2022 21:54), Max: 98.5 (07 Jun 2022 17:49)  HR: 56 (07 Jun 2022 20:24) (52 - 63)  BP: 143/58 (07 Jun 2022 20:24) (124/60 - 156/70)  BP(mean): 83 (07 Jun 2022 20:24) (83 - 100)  RR: 16 (07 Jun 2022 20:24) (15 - 18)  SpO2: 100% (07 Jun 2022 20:24) (94% - 100%)    PHYSICAL EXAM:  General: NAD, pt is comfortably sitting up in bed, on room air  HEENT: CN II-XII grossly intact, PERRL 3mm briskly reactive, EOMI b/l, face symmetric, tongue midline, neck FROM  Cardiovascular: RRR, normal S1 and S2   Respiratory: symmetric chest rise  GI: abd soft, NTND   Neuro: A&Ox3, No aphasia, speech clear, no dysmetria, no pronator drift. Follows commands.  BEY x4 spontaneously, 5/5 strength in all extremities throughout. SILT throughout   Extremities: warm & well perfused  LABS:                        13.4   4.48  )-----------( 223      ( 07 Jun 2022 05:30 )             41.4     06-07    142  |  108  |  17  ----------------------------<  91  4.1   |  23  |  0.89    Ca    9.6      07 Jun 2022 05:30  Phos  4.0     06-07  Mg     2.2     06-07          CULTURES:      RADIOLOGY & ADDITIONAL STUDIES:    Assessment:   70 y/o F with no pmh and recent long flight from [place] on Wednesday presents with L hemiparesis, L facial droop and R gaze deviation. LKW 2:30pm 5/29. NIHSS improved to 0 in ER and no TPA given.   CTP with elevated Tmax in the R M2 region. CTA head and neck with proximal occlusion of two posterior right M2.  Pressure dependent requiring pressors to maintain -180. Pending SPECT, MR, MRA NOVA for further work up.     Plan:  - Nothing to do from a neurosurgical standpoint at this time  - will sign off, re-consult if any acute neurological changes   - Remainder of care per primary team    D/w Dr. Burgos

## 2022-06-08 ENCOUNTER — TRANSCRIPTION ENCOUNTER (OUTPATIENT)
Age: 72
End: 2022-06-08

## 2022-06-08 LAB
ANION GAP SERPL CALC-SCNC: 10 MMOL/L — SIGNIFICANT CHANGE UP (ref 5–17)
BUN SERPL-MCNC: 12 MG/DL — SIGNIFICANT CHANGE UP (ref 7–23)
CALCIUM SERPL-MCNC: 9.8 MG/DL — SIGNIFICANT CHANGE UP (ref 8.4–10.5)
CHLORIDE SERPL-SCNC: 105 MMOL/L — SIGNIFICANT CHANGE UP (ref 96–108)
CO2 SERPL-SCNC: 25 MMOL/L — SIGNIFICANT CHANGE UP (ref 22–31)
CREAT SERPL-MCNC: 0.82 MG/DL — SIGNIFICANT CHANGE UP (ref 0.5–1.3)
EGFR: 76 ML/MIN/1.73M2 — SIGNIFICANT CHANGE UP
GLUCOSE SERPL-MCNC: 88 MG/DL — SIGNIFICANT CHANGE UP (ref 70–99)
HCT VFR BLD CALC: 41.6 % — SIGNIFICANT CHANGE UP (ref 34.5–45)
HGB BLD-MCNC: 13.5 G/DL — SIGNIFICANT CHANGE UP (ref 11.5–15.5)
MAGNESIUM SERPL-MCNC: 2.2 MG/DL — SIGNIFICANT CHANGE UP (ref 1.6–2.6)
MCHC RBC-ENTMCNC: 30.5 PG — SIGNIFICANT CHANGE UP (ref 27–34)
MCHC RBC-ENTMCNC: 32.5 GM/DL — SIGNIFICANT CHANGE UP (ref 32–36)
MCV RBC AUTO: 94.1 FL — SIGNIFICANT CHANGE UP (ref 80–100)
NRBC # BLD: 0 /100 WBCS — SIGNIFICANT CHANGE UP (ref 0–0)
PHOSPHATE SERPL-MCNC: 3.6 MG/DL — SIGNIFICANT CHANGE UP (ref 2.5–4.5)
PLATELET # BLD AUTO: 220 K/UL — SIGNIFICANT CHANGE UP (ref 150–400)
POTASSIUM SERPL-MCNC: 4.1 MMOL/L — SIGNIFICANT CHANGE UP (ref 3.5–5.3)
POTASSIUM SERPL-SCNC: 4.1 MMOL/L — SIGNIFICANT CHANGE UP (ref 3.5–5.3)
RBC # BLD: 4.42 M/UL — SIGNIFICANT CHANGE UP (ref 3.8–5.2)
RBC # FLD: 13.7 % — SIGNIFICANT CHANGE UP (ref 10.3–14.5)
SODIUM SERPL-SCNC: 140 MMOL/L — SIGNIFICANT CHANGE UP (ref 135–145)
WBC # BLD: 4.88 K/UL — SIGNIFICANT CHANGE UP (ref 3.8–10.5)
WBC # FLD AUTO: 4.88 K/UL — SIGNIFICANT CHANGE UP (ref 3.8–10.5)

## 2022-06-08 PROCEDURE — 99233 SBSQ HOSP IP/OBS HIGH 50: CPT

## 2022-06-08 PROCEDURE — 93308 TTE F-UP OR LMTD: CPT | Mod: 26

## 2022-06-08 RX ORDER — ASPIRIN/CALCIUM CARB/MAGNESIUM 324 MG
81 TABLET ORAL DAILY
Refills: 0 | Status: DISCONTINUED | OUTPATIENT
Start: 2022-06-09 | End: 2022-06-09

## 2022-06-08 RX ORDER — CLOPIDOGREL BISULFATE 75 MG/1
75 TABLET, FILM COATED ORAL DAILY
Refills: 0 | Status: DISCONTINUED | OUTPATIENT
Start: 2022-06-09 | End: 2022-06-09

## 2022-06-08 RX ORDER — MIDODRINE HYDROCHLORIDE 2.5 MG/1
2.5 TABLET ORAL EVERY 8 HOURS
Refills: 0 | Status: DISCONTINUED | OUTPATIENT
Start: 2022-06-08 | End: 2022-06-09

## 2022-06-08 RX ORDER — ENOXAPARIN SODIUM 100 MG/ML
40 INJECTION SUBCUTANEOUS EVERY 24 HOURS
Refills: 0 | Status: DISCONTINUED | OUTPATIENT
Start: 2022-06-09 | End: 2022-06-09

## 2022-06-08 RX ADMIN — MIDODRINE HYDROCHLORIDE 2.5 MILLIGRAM(S): 2.5 TABLET ORAL at 21:35

## 2022-06-08 RX ADMIN — POLYETHYLENE GLYCOL 3350 17 GRAM(S): 17 POWDER, FOR SOLUTION ORAL at 17:11

## 2022-06-08 RX ADMIN — ENOXAPARIN SODIUM 80 MILLIGRAM(S): 100 INJECTION SUBCUTANEOUS at 06:00

## 2022-06-08 RX ADMIN — MIDODRINE HYDROCHLORIDE 5 MILLIGRAM(S): 2.5 TABLET ORAL at 06:00

## 2022-06-08 RX ADMIN — ATORVASTATIN CALCIUM 80 MILLIGRAM(S): 80 TABLET, FILM COATED ORAL at 21:34

## 2022-06-08 RX ADMIN — MIDODRINE HYDROCHLORIDE 2.5 MILLIGRAM(S): 2.5 TABLET ORAL at 14:17

## 2022-06-08 RX ADMIN — ENOXAPARIN SODIUM 80 MILLIGRAM(S): 100 INJECTION SUBCUTANEOUS at 17:11

## 2022-06-08 NOTE — PROGRESS NOTE ADULT - NS ATTEND AMEND GEN_ALL_CORE FT
The patient is a 71-year-old female with no known past medical history who was admitted 5/29 after presenting with left-sided hemiparesis in the setting of right M2 occlusions.  Symptoms have entirely resolved. SPECT showed some reversible defect in R M2 distribution but more recent MRA shows complete recanalization of her prior M2 occlusions. Source of emboli is unclear. TTE (with bubble) was negative and LALO was otherwise unremarkable apart from LVH.  She has been treated with anticoagulation, now on therapeutic Lovenox but in the absence of true indication for a/c , we will transition to DAPT for 21 days, then enroll her in [facility].  Meanwhile, we will determine price of different cardiac monitoring devices. Continue to wean her midodrine given the absence of severe stenosis/occlusive disease.
The patient is a 71-year-old female with no known past medical history who was admitted 5/29 after presenting with left-sided hemiparesis in the setting of right M2 occlusions.  Symptoms have entirely resolved. SPECT showed some reversible flow in R M2 distribution but more recent MRA shows complete recanalization of her prior M2 occlusions. Source of emboli is unclear. TTE (without bubble) was negative and LALO was otherwise unremarkable apart from LVH.  She has been treated with anticoagulation, now on therapeutic Lovenox. We will obtain TTE with bubble to ensure she truly has no PFO given the potential for paradoxical embolism in the setting of recent extensive travel. If no PFO and no documented a fib, plan to transition to DAPT as there is no clear indication for a/c otherwise. We will start to wean her midodrine given the absence of severe stenosis/occlusive disease.  Investigating cardiac monitoring options due to lack of insurance-apple watch vs Ziopatch. Continue statin (LDL 65).
The patient is a 71-year-old female with no known past medical history who was admitted 5/29 after presenting with left-sided hemiparesis in the setting of right M2 occlusions.  Symptoms have entirely resolved. She is currently awaiting MR nova. SPECT showed some reversible flow in R M2 distribution. LALO was otherwise unremarkable apart from LVH.  She has been treated with anticoagulation, now on therapeutic Lovenox. Plan to transition to DAPT if no surgical intervention is planned. Due to intermittent, asymptomatic hypotension, she has also been started on midodrine.  She is tolerating SBP in 120's and ambulation well. Investigating cardiac monitoring options due to lack of insurance. Continue statin (LDL 65).
The patient is a 71-year-old female with no known past medical history who was admitted 5/29 after presenting with left-sided hemiparesis in the setting of right M2 occlusions.  Symptoms have entirely resolved. SPECT showed some reversible flow in R M2 distribution but more recent MRA shows complete recanalization of her prior M2 occlusions. Source of emboli is unclear. TTE (without bubble) was negative and LALO was otherwise unremarkable apart from LVH.  She has been treated with anticoagulation, now on therapeutic Lovenox. We will obtain TTE with bubble to ensure she truly has no PFO given the potential for paradoxical embolism in the setting of recent extensive travel. If no PFO and no documented a fib, plan to transition to DAPT as there is no clear indication for a/c otherwise. We will start to wean her midodrine given the absence of severe stenosis/occlusive disease.  Investigating cardiac monitoring options due to lack of insurance-apple watch vs Ziopatch. Continue statin (LDL 65).

## 2022-06-08 NOTE — PROGRESS NOTE ADULT - TIME BILLING
review of patient information including recent vital signs, labs, imaging, and notes; assessing, examining patient; updating patient/family; discussion and coordination of care with multidisciplinary team.

## 2022-06-08 NOTE — PROGRESS NOTE ADULT - ASSESSMENT
70 y/o F with no pmh and recent long flight from [place] ([facility]) on Wednesday presented to [facility] on 5/29 with L hemiparesis, L facial droop and R gaze deviation. Initial NIHSS 4. Initial CTH without acute abnormality. CTP with elevated Tmax in the R M2 region. CTA head and neck with proximal occlusion of two posterior right M2. Patient improved to NIHSS 0 so no TPA was given. Repeat CTH on 5/29 with small hypodensity in right insula suspicious for acute infarct. Patient not a thrombectomy candidate. Underwent cerebral bypass testing, MR BENITEZ demonstrated recanalized R M2 segment, no intervention per NSGY. Pending discharge home 6/9.     Neuro  #CVA workup  - switch therapeutic lovenox to aspirin 81mg and plavix 75mg on 6/9  - q4hr stroke neuro checks and vitals  - MR NOVA 6/6 - recanalized RM2  - SPECT: reversible defect in RM2. Slight decreased activity in L cerebral hemisphere due to relative steal.   - continue Lovenox 80mg BID for now, dependent on the MR NOVA results may consider switching to DAPT  - Stroke Code HCT Results: no acute abnormality --> Interval development of a small focal hypodensity in the right insula suspicious for acute infarction. No mass effect or hemorrhagic transformation.  - Stroke Code CTA Results: Proximal occlusion of two posterior right M2 branches consistent with thrombosis. Paucity of flow in the posterior right MCA distal to this level.  - Stroke education    Cards  - Goal -180  - spoke with EP about ILR - they are concerned about financial burden given patient has no insurance and going to [place] after 6 months here. Family is looking into travelers insurance and SW says patient qualifies for emergency Medicaid, however, that will only cover inpatient hospital visit. Other option is for Zio Patch; this will also not be covered by emergency Medicaid - need to have discussion with family about cost and ability to cover. Unable to facilitate outpatient heart monitoring at [facility], will provide f/u appointment with [facility] for further management.   - midodrine weaned to 2.5mg q8h for orthostatic hypotension  - continue compression stockings  - maintain blood pressures >110  - TTE: no PFO, EF 65%  - LALO: no LA thrombus  - LDL results: 65  - c/w atorvastatin 80mg  - LE doppler neg  - hypercoag panel wnl    Pulm  - call provider if SPO2 < 94%    GI  #Nutrition/Fluids/Electrolytes   - replete K<4 and Mg <2  - Diet: DASH  - IVF: n/a    Renal  - daily BMP    Endocrine  - A1C results: 5.1  - TSH results: 1.04    DVT Prophylaxis  - SCDs & therapeutic lovenox    Dispo: Home     Discussed daily hospital plans and goals with patient and family at bedside.     Discussed with Neurology Attending Dr. Schwab

## 2022-06-08 NOTE — PROGRESS NOTE ADULT - ASSESSMENT
This is a 70yo woman with no PMH who presented after a flight from [facility] w/acute onset L-sided hemiparesis, found to have proximal occlusion of two posterior right M2 branches consistent with thrombosis (not a candidate for thrombectomy).  Hospital course notable for asymptomatic bradycardia and asymptomatic hypotension.  Waiting for MR-NOVA.     #R-MCA Thrombosis   -- Reviewed MR head and MR-NOVA, subacute small infarct of R insula, evolved and punctate foci in R frontal lobe consistent w acute infarcts; interval recanaization of R prox M2   -- therapeutic Lovenox BID transition to DAPT per stroke service  - LALO w doppler 6/2 reviewed  CONCLUSIONS:   1. Mild to moderate symmetric left ventricular hypertrophy.   2. Normal left ventricular systolic function.   3. Normal right ventricular size and systolic function.   4. No LA/WENDIE thrombus seen.   5. No evidence of an intracardiac shunt.   6. No significant valvular disease.   7. No pericardial effusion.   8. The aortic root is normal in size. There is no significant plaque   seen in the visualized portion of the descending aorta. There is no   significant plaque seen in the visualized portion of the aortic arch.    -- c/w high intensity statin   -- ILR will be pursued at [facility] post-discharge    #Asymptomatic Hypotension  -- BP stable, Midodrine titrate down to 2.5mg TID today    #Asymptomatic Sinus Bradycardia, resolved    #Uninsured  #Foreign traveler  -- visiting from [facility] and has no insurance  -- SW, RE: demario meds  -- plans to be here until October     #Diet - Regular  #DVT PPx - Lovenox as per above  #Dispo - TBD

## 2022-06-08 NOTE — PROGRESS NOTE ADULT - SUBJECTIVE AND OBJECTIVE BOX
SUBJECTIVE:  Denies blurry vision, headaches, nausea, vomiting    PHYSICAL EXAM:  Gen: NAD, sitting upright in bed  HEENT: NCAT, MMM, clear OP  CV: bradycardic but regular, no m/g/r appreciated  Pulm: CTA B, no w/r/r; no increase in WOB  Neuro: AOx3, CN II-XII grossly intact; nonfocal   SUBJECTIVE:  Denies blurry vision, headaches, nausea, vomiting    PHYSICAL EXAM:  Gen: NAD, sitting upright in bed  HEENT: NCAT, MMM, clear OP  CV: bradycardic but regular, no m/g/r appreciated  Pulm: CTA B, no w/r/r; no increase in WOB  Neuro: AOx3, CN II-XII grossly intact; no focal deficits

## 2022-06-08 NOTE — PROGRESS NOTE ADULT - SUBJECTIVE AND OBJECTIVE BOX
Neurology Stroke Progress Note    INTERVAL HPI/OVERNIGHT EVENTS:  No acute overnight events. Patient seen and examined with attending at bedside. Pt is feeling well, in no acute distress. Denies any new symptoms or pain. Son at bedside, all questions addressed.     MEDICATIONS  (STANDING):  atorvastatin 80 milliGRAM(s) Oral daily  enoxaparin Injectable 80 milliGRAM(s) SubCutaneous every 12 hours  midodrine 2.5 milliGRAM(s) Oral every 8 hours  polyethylene glycol 3350 17 Gram(s) Oral daily    MEDICATIONS  (PRN):  acetaminophen     Tablet .. 650 milliGRAM(s) Oral every 6 hours PRN Moderate Pain (4 - 6)    Allergies    No Known Allergies    Intolerances    Vital Signs Last 24 Hrs  T(C): 37.1 (08 Jun 2022 14:17), Max: 37.1 (08 Jun 2022 14:17)  T(F): 98.7 (08 Jun 2022 14:17), Max: 98.7 (08 Jun 2022 14:17)  HR: 66 (08 Jun 2022 12:49) (43 - 66)  BP: 118/68 (08 Jun 2022 12:49) (116/59 - 143/58)  BP(mean): 88 (08 Jun 2022 12:49) (82 - 88)  RR: 18 (08 Jun 2022 12:49) (16 - 18)  SpO2: 99% (08 Jun 2022 12:49) (96% - 100%)    Physical exam:  General: No acute distress, awake and alert  Eyes: Anicteric sclerae, moist conjunctivae, see below for CNs  Neck: trachea midline  Cardiovascular: Regular rate and rhythm, no murmurs, rubs, or gallops.   Pulmonary: Anterior breath sounds clear bilaterally, no crackles or wheezing. No use of accessory muscles  GI: Abdomen soft, non-distended, non-tender  Extremities: Radial and DP pulses +2, no edema    Neurologic:  -Mental status: Awake, alert, oriented to person, place, and time. Speech is fluent with intact naming, repetition, and comprehension, no dysarthria. Recent and remote memory intact. Follows commands. Attention/concentration intact. Fund of knowledge appropriate.  -Cranial nerves:   II: Visual fields are full to confrontation.  III, IV, VI: Extraocular movements are intact without nystagmus. Pupils equally round and reactive to light  V:  Facial sensation V1-V3 equal and intact   VII: Face is symmetric with normal eye closure and smile  VIII: Hearing is bilaterally intact to finger rub  XII: Tongue protrudes midline  Motor: Normal bulk and tone. No pronator drift. Strength bilateral upper extremity 5/5, bilateral lower extremities 5/5.  Sensation: Intact to light touch bilaterally. No neglect or extinction on double simultaneous testing.  Coordination: No dysmetria on finger-to-nose and heel-to-shin bilaterally  Reflexes: Downgoing toes bilaterally   Gait: Narrow gait and steady    LABS:                        13.5   4.88  )-----------( 220      ( 08 Jun 2022 05:30 )             41.6     06-08    140  |  105  |  12  ----------------------------<  88  4.1   |  25  |  0.82    Ca    9.8      08 Jun 2022 05:30  Phos  3.6     06-08  Mg     2.2     06-08      RADIOLOGY & ADDITIONAL TESTS:    < from: MR Angio Head No Cont (06.06.22 @ 18:34) >  IMPRESSION:    Since prior CTA 5/29/2022, interval recanalization of the right proximal   right M2 segments with volumetric flow rate demonstrated on MRA nova   which is approximately equal to the left MCA. No large vessel occlusion.   MRA nova flow rates as above.    < end of copied text >

## 2022-06-09 ENCOUNTER — TRANSCRIPTION ENCOUNTER (OUTPATIENT)
Age: 72
End: 2022-06-09

## 2022-06-09 VITALS — TEMPERATURE: 99 F

## 2022-06-09 PROCEDURE — 85306 CLOT INHIBIT PROT S FREE: CPT

## 2022-06-09 PROCEDURE — 70498 CT ANGIOGRAPHY NECK: CPT | Mod: MA

## 2022-06-09 PROCEDURE — 93307 TTE W/O DOPPLER COMPLETE: CPT

## 2022-06-09 PROCEDURE — 93970 EXTREMITY STUDY: CPT

## 2022-06-09 PROCEDURE — 85610 PROTHROMBIN TIME: CPT

## 2022-06-09 PROCEDURE — 96374 THER/PROPH/DIAG INJ IV PUSH: CPT

## 2022-06-09 PROCEDURE — 85613 RUSSELL VIPER VENOM DILUTED: CPT

## 2022-06-09 PROCEDURE — 82962 GLUCOSE BLOOD TEST: CPT

## 2022-06-09 PROCEDURE — 86146 BETA-2 GLYCOPROTEIN ANTIBODY: CPT

## 2022-06-09 PROCEDURE — U0005: CPT

## 2022-06-09 PROCEDURE — 99291 CRITICAL CARE FIRST HOUR: CPT | Mod: 25

## 2022-06-09 PROCEDURE — 80048 BASIC METABOLIC PNL TOTAL CA: CPT

## 2022-06-09 PROCEDURE — 80053 COMPREHEN METABOLIC PANEL: CPT

## 2022-06-09 PROCEDURE — 97535 SELF CARE MNGMENT TRAINING: CPT

## 2022-06-09 PROCEDURE — 70544 MR ANGIOGRAPHY HEAD W/O DYE: CPT

## 2022-06-09 PROCEDURE — A9557: CPT

## 2022-06-09 PROCEDURE — 86901 BLOOD TYPING SEROLOGIC RH(D): CPT

## 2022-06-09 PROCEDURE — 70496 CT ANGIOGRAPHY HEAD: CPT | Mod: MA

## 2022-06-09 PROCEDURE — 97110 THERAPEUTIC EXERCISES: CPT

## 2022-06-09 PROCEDURE — 84100 ASSAY OF PHOSPHORUS: CPT

## 2022-06-09 PROCEDURE — 84443 ASSAY THYROID STIM HORMONE: CPT

## 2022-06-09 PROCEDURE — 70450 CT HEAD/BRAIN W/O DYE: CPT | Mod: MA

## 2022-06-09 PROCEDURE — 97530 THERAPEUTIC ACTIVITIES: CPT

## 2022-06-09 PROCEDURE — 86147 CARDIOLIPIN ANTIBODY EA IG: CPT

## 2022-06-09 PROCEDURE — 85307 ASSAY ACTIVATED PROTEIN C: CPT

## 2022-06-09 PROCEDURE — 83735 ASSAY OF MAGNESIUM: CPT

## 2022-06-09 PROCEDURE — 97116 GAIT TRAINING THERAPY: CPT

## 2022-06-09 PROCEDURE — 93306 TTE W/DOPPLER COMPLETE: CPT

## 2022-06-09 PROCEDURE — 99239 HOSP IP/OBS DSCHRG MGMT >30: CPT

## 2022-06-09 PROCEDURE — 86900 BLOOD TYPING SEROLOGIC ABO: CPT

## 2022-06-09 PROCEDURE — 36415 COLL VENOUS BLD VENIPUNCTURE: CPT

## 2022-06-09 PROCEDURE — 85598 HEXAGNAL PHOSPH PLTLT NEUTRL: CPT

## 2022-06-09 PROCEDURE — 85300 ANTITHROMBIN III ACTIVITY: CPT

## 2022-06-09 PROCEDURE — 97161 PT EVAL LOW COMPLEX 20 MIN: CPT

## 2022-06-09 PROCEDURE — 78832 RP LOCLZJ TUM SPECT W/CT 2: CPT

## 2022-06-09 PROCEDURE — 71045 X-RAY EXAM CHEST 1 VIEW: CPT

## 2022-06-09 PROCEDURE — 85303 CLOT INHIBIT PROT C ACTIVITY: CPT

## 2022-06-09 PROCEDURE — 85027 COMPLETE CBC AUTOMATED: CPT

## 2022-06-09 PROCEDURE — 85730 THROMBOPLASTIN TIME PARTIAL: CPT

## 2022-06-09 PROCEDURE — 83036 HEMOGLOBIN GLYCOSYLATED A1C: CPT

## 2022-06-09 PROCEDURE — 86803 HEPATITIS C AB TEST: CPT

## 2022-06-09 PROCEDURE — 70551 MRI BRAIN STEM W/O DYE: CPT

## 2022-06-09 PROCEDURE — U0003: CPT

## 2022-06-09 PROCEDURE — 84484 ASSAY OF TROPONIN QUANT: CPT

## 2022-06-09 PROCEDURE — 85025 COMPLETE CBC W/AUTO DIFF WBC: CPT

## 2022-06-09 PROCEDURE — 86850 RBC ANTIBODY SCREEN: CPT

## 2022-06-09 PROCEDURE — 87635 SARS-COV-2 COVID-19 AMP PRB: CPT

## 2022-06-09 PROCEDURE — 83090 ASSAY OF HOMOCYSTEINE: CPT

## 2022-06-09 PROCEDURE — 93312 ECHO TRANSESOPHAGEAL: CPT

## 2022-06-09 PROCEDURE — 80061 LIPID PANEL: CPT

## 2022-06-09 PROCEDURE — 0042T: CPT

## 2022-06-09 RX ORDER — CLOPIDOGREL BISULFATE 75 MG/1
1 TABLET, FILM COATED ORAL
Qty: 30 | Refills: 0
Start: 2022-06-09 | End: 2022-07-08

## 2022-06-09 RX ORDER — ASPIRIN/CALCIUM CARB/MAGNESIUM 324 MG
1 TABLET ORAL
Qty: 0 | Refills: 0 | DISCHARGE
Start: 2022-06-09

## 2022-06-09 RX ORDER — ATORVASTATIN CALCIUM 80 MG/1
1 TABLET, FILM COATED ORAL
Qty: 30 | Refills: 0
Start: 2022-06-09 | End: 2022-07-08

## 2022-06-09 RX ORDER — ASPIRIN/CALCIUM CARB/MAGNESIUM 324 MG
1 TABLET ORAL
Qty: 30 | Refills: 0
Start: 2022-06-09 | End: 2022-07-08

## 2022-06-09 RX ORDER — ATORVASTATIN CALCIUM 80 MG/1
1 TABLET, FILM COATED ORAL
Qty: 0 | Refills: 0 | DISCHARGE
Start: 2022-06-09

## 2022-06-09 RX ORDER — MIDODRINE HYDROCHLORIDE 2.5 MG/1
1 TABLET ORAL
Qty: 0 | Refills: 0 | DISCHARGE
Start: 2022-06-09

## 2022-06-09 RX ORDER — MIDODRINE HYDROCHLORIDE 2.5 MG/1
1 TABLET ORAL
Qty: 90 | Refills: 0
Start: 2022-06-09 | End: 2022-07-08

## 2022-06-09 RX ORDER — CLOPIDOGREL BISULFATE 75 MG/1
1 TABLET, FILM COATED ORAL
Qty: 0 | Refills: 0 | DISCHARGE
Start: 2022-06-09

## 2022-06-09 RX ADMIN — CLOPIDOGREL BISULFATE 75 MILLIGRAM(S): 75 TABLET, FILM COATED ORAL at 12:24

## 2022-06-09 RX ADMIN — Medication 81 MILLIGRAM(S): at 12:23

## 2022-06-09 RX ADMIN — MIDODRINE HYDROCHLORIDE 2.5 MILLIGRAM(S): 2.5 TABLET ORAL at 06:49

## 2022-06-09 RX ADMIN — MIDODRINE HYDROCHLORIDE 2.5 MILLIGRAM(S): 2.5 TABLET ORAL at 14:07

## 2022-06-09 RX ADMIN — ENOXAPARIN SODIUM 40 MILLIGRAM(S): 100 INJECTION SUBCUTANEOUS at 06:50

## 2022-06-09 RX ADMIN — POLYETHYLENE GLYCOL 3350 17 GRAM(S): 17 POWDER, FOR SOLUTION ORAL at 12:23

## 2022-06-09 NOTE — DISCHARGE NOTE PROVIDER - HOSPITAL COURSE
71y Female with no past medical history, not on any meds, just got off a long flight from [place] this past Wednesday presents with left sided hemiparesis. LKW 2:30 PM 5/29 when she was with son and was found with left sided weakness around 4 pm. On immediate presentation to the ED, NIHSS 4 which improved to NIHSS 0 after CTH scans. CTH with no hemorrhage. CTP with elevated Tmax in the R M2 region. CTA head and neck with proximal occlusion of two posterior right M2. Patient no longer a tap candidate due to return to baseline. Admitted to MICU for thrombectomy watch and started on pressors to maintain cerebral perfusion, heparin drip. Neurosurgery consulted for surgical bypass work up.  SPECT scan did show reversible defect in R M2 distribution, however MR Angio Head NOVA scan obtained later in her hospital course showed recanalization of M2 occlusion. Therefore, no neurosurgical intervention. Stroke etiology unclear as work up so far negative for stroke source. She was transitioned from heparin drip to therapeutic lovenox, however as there is no clear indication for anticoagulation, she was transitioned to DAPT x 21 days. Pressors were transitioned to midodrine with plans to downtitrate. Patient would need a ILR to complete the stroke workup, however as she is [place] with no travelers insurance, she will need to f/u at [facility] for stroke clinic follow up.     During this hospital course, patient had an ischemic infarct in right insula, right frontal seen on MRI.   The stroke etiology is likely secondary to:  [x]etiology workup still in progress    Patient had the following workup done in house:  CT Head 5/29: No acute intracranial abnormality.  MR Head Non Contrast 6/6: Subacute small infarction within the right insula which has evolved from   CT head 5/30/2022. Few additional punctate foci of restricted diffusion in the right frontal lobe consistent with recent infarctions..NM SPECT/CT Brain: Reversible defect in the distribution of the right M2 segment corresponding to an abnormality on recent CT angiographyand perfusion study.  Slightly decreased activity in a portion of the left cerebral hemisphere may be due to a relative steal, that is, increased activity in the uninvolved portion of the right cerebral hemisphere compared to the normal left.  MR Angio Head NOVA: Since prior CTA 5/29/2022, interval recanalization of the right proximal   right M2 segments with volumetric flow rate demonstrated on MRA nova   which is approximately equal to the left MCA. No large vessel occlusion.   CT Angio Head 5/29:  Occlusion of the proximal right M2 segments the   anterior and superior divisions.  CT Angio Neck 5/29: No stenosis or occlusion.  Echo:    1. Normal left ventricular size and systolic function.   2. Left ventricular endocardium is not well visualizaed, unable to   definitively evaluate left ventricular wall motion.   3. Mild symmetric left ventricular hypertrophy.   4. Normal right ventricular size and systolic function.   5. Normal atria.   6. No significant valvular disease.   7. No pericardial effusion.   8. No prior echo is available for comparison.   9. Injection of agitated saline via a peripheral vein reveals no   evidence of a right-to-left shunt.    LALO:   1. Mild to moderate symmetric left ventricular hypertrophy.   2. Normal left ventricular systolic function.   3. Normal right ventricular size and systolic function.   4. No LA/WENDIE thrombus seen.   5. No evidence of an intracardiac shunt.   6. No significant valvular disease.   7. No pericardial effusion.   8. The aortic root is normal in size. There is no significant plaque   seen in the visualized portion of the descending aorta. There is no   significant plaque seen in the visualized portion of the aortic arch.    LE doppler: No evidence of deep venous thrombosis in either lower extremity.    Labs: A1c 5.1, LDL 65, hypercoagable w/u negative     Physical exam at discharge:  NIHSS at discharge:     New medications on discharge: aspirin 81mg daily, plavix 75 mg daily, atorvastatin 80 mg daily  Labs to be followed up:  Imaging to be done as outpatient:  Further outpatient workup: ILR placement   71y Female with no past medical history, not on any meds, just got off a long flight from [place] this past Wednesday presents with left sided hemiparesis. LKW 2:30 PM 5/29 when she was with son and was found with left sided weakness around 4 pm. On immediate presentation to the ED, NIHSS 4 which improved to NIHSS 0 after CTH scans. CTH with no hemorrhage. CTP with elevated Tmax in the R M2 region. CTA head and neck with proximal occlusion of two posterior right M2. Patient no longer a tap candidate due to return to baseline. Admitted to MICU for thrombectomy watch and started on pressors to maintain cerebral perfusion, heparin drip. Neurosurgery consulted for surgical bypass work up.  SPECT scan did show reversible defect in R M2 distribution, however MR Angio Head NOVA scan obtained later in her hospital course showed recanalization of M2 occlusion. Therefore, no neurosurgical intervention. Stroke etiology unclear as work up so far negative for stroke source. She was transitioned from heparin drip to therapeutic lovenox, however as there is no clear indication for anticoagulation, she was transitioned to DAPT x 21 days. Pressors were transitioned to midodrine with plans to downtitrate. Patient would need a ILR to complete the stroke workup, however as she is visiting abroad with no travelers insurance, she will need to f/u at [facility] for stroke clinic follow up.     During this hospital course, patient had an ischemic infarct in right insula, right frontal seen on MRI.   The stroke etiology is likely secondary to:  [x]etiology workup still in progress    Patient had the following workup done in house:  CT Head 5/29: No acute intracranial abnormality.  MR Head Non Contrast 6/6: Subacute small infarction within the right insula which has evolved from   CT head 5/30/2022. Few additional punctate foci of restricted diffusion in the right frontal lobe consistent with recent infarctions..NM SPECT/CT Brain: Reversible defect in the distribution of the right M2 segment corresponding to an abnormality on recent CT angiographyand perfusion study.  Slightly decreased activity in a portion of the left cerebral hemisphere may be due to a relative steal, that is, increased activity in the uninvolved portion of the right cerebral hemisphere compared to the normal left.  MR Angio Head NOVA: Since prior CTA 5/29/2022, interval recanalization of the right proximal   right M2 segments with volumetric flow rate demonstrated on MRA nova   which is approximately equal to the left MCA. No large vessel occlusion.   CT Angio Head 5/29:  Occlusion of the proximal right M2 segments the   anterior and superior divisions.  CT Angio Neck 5/29: No stenosis or occlusion.  Echo:    1. Normal left ventricular size and systolic function.   2. Left ventricular endocardium is not well visualizaed, unable to   definitively evaluate left ventricular wall motion.   3. Mild symmetric left ventricular hypertrophy.   4. Normal right ventricular size and systolic function.   5. Normal atria.   6. No significant valvular disease.   7. No pericardial effusion.   8. No prior echo is available for comparison.   9. Injection of agitated saline via a peripheral vein reveals no   evidence of a right-to-left shunt.    LALO:   1. Mild to moderate symmetric left ventricular hypertrophy.   2. Normal left ventricular systolic function.   3. Normal right ventricular size and systolic function.   4. No LA/WENDIE thrombus seen.   5. No evidence of an intracardiac shunt.   6. No significant valvular disease.   7. No pericardial effusion.   8. The aortic root is normal in size. There is no significant plaque   seen in the visualized portion of the descending aorta. There is no   significant plaque seen in the visualized portion of the aortic arch.    LE doppler: No evidence of deep venous thrombosis in either lower extremity.    Labs: A1c 5.1, LDL 65, hypercoagable w/u negative     Physical exam at discharge:  NIHSS at discharge:     New medications on discharge: aspirin 81mg daily, plavix 75 mg daily, atorvastatin 80 mg daily, Midodrine 2.5mg Q8H  Labs to be followed up:  Imaging to be done as outpatient:  Further outpatient workup: ILR placement   71y Female with no past medical history, not on any meds, just got off a long flight from [place] this past Wednesday presents with left sided hemiparesis. LKW 2:30 PM 5/29 when she was with son and was found with left sided weakness around 4 pm. On immediate presentation to the ED, NIHSS 4 which improved to NIHSS 0 after CTH scans. CTH with no hemorrhage. CTP with elevated Tmax in the R M2 region. CTA head and neck with proximal occlusion of two posterior right M2. Patient no longer a tap candidate due to return to baseline. Admitted to MICU for thrombectomy watch and started on pressors to maintain cerebral perfusion, heparin drip. Neurosurgery consulted for surgical bypass work up.  SPECT scan did show reversible defect in R M2 distribution, however MR Angio Head NOVA scan obtained later in her hospital course showed recanalization of M2 occlusion. Therefore, no neurosurgical intervention. Stroke etiology unclear as work up so far negative for stroke source. She was transitioned from heparin drip to therapeutic lovenox, however as there is no clear indication for anticoagulation, she was transitioned to DAPT x 21 days. Pressors were transitioned to midodrine with plans to downtitrate. Patient would need a ILR to complete the stroke workup, however as she is visiting abroad with no travelers insurance, she will need to f/u at [facility] for stroke clinic follow up.     During this hospital course, patient had an ischemic infarct in right insula, right frontal seen on MRI.   The stroke etiology is likely secondary to:  [x]etiology workup still in progress    Patient had the following workup done in house:  CT Head 5/29: No acute intracranial abnormality.  MR Head Non Contrast 6/6: Subacute small infarction within the right insula which has evolved from   CT head 5/30/2022. Few additional punctate foci of restricted diffusion in the right frontal lobe consistent with recent infarctions..NM SPECT/CT Brain: Reversible defect in the distribution of the right M2 segment corresponding to an abnormality on recent CT angiographyand perfusion study.  Slightly decreased activity in a portion of the left cerebral hemisphere may be due to a relative steal, that is, increased activity in the uninvolved portion of the right cerebral hemisphere compared to the normal left.  MR Angio Head NOVA: Since prior CTA 5/29/2022, interval recanalization of the right proximal   right M2 segments with volumetric flow rate demonstrated on MRA nova   which is approximately equal to the left MCA. No large vessel occlusion.   CT Angio Head 5/29:  Occlusion of the proximal right M2 segments the   anterior and superior divisions.  CT Angio Neck 5/29: No stenosis or occlusion.  Echo:    1. Normal left ventricular size and systolic function.   2. Left ventricular endocardium is not well visualizaed, unable to   definitively evaluate left ventricular wall motion.   3. Mild symmetric left ventricular hypertrophy.   4. Normal right ventricular size and systolic function.   5. Normal atria.   6. No significant valvular disease.   7. No pericardial effusion.   8. No prior echo is available for comparison.   9. Injection of agitated saline via a peripheral vein reveals no   evidence of a right-to-left shunt.    LALO:   1. Mild to moderate symmetric left ventricular hypertrophy.   2. Normal left ventricular systolic function.   3. Normal right ventricular size and systolic function.   4. No LA/WENDIE thrombus seen.   5. No evidence of an intracardiac shunt.   6. No significant valvular disease.   7. No pericardial effusion.   8. The aortic root is normal in size. There is no significant plaque   seen in the visualized portion of the descending aorta. There is no   significant plaque seen in the visualized portion of the aortic arch.    LE doppler: No evidence of deep venous thrombosis in either lower extremity.    Labs: A1c 5.1, LDL 65, hypercoagable w/u negative     Physical exam at discharge:  Neurologic:  -Mental status: Awake, alert, oriented to person, place, and time. Speech is fluent with intact naming, repetition, and comprehension, no dysarthria. Recent and remote memory intact. Follows commands. Attention/concentration intact. Fund of knowledge appropriate.  -Cranial nerves:   II: Visual fields are full to confrontation.  III, IV, VI: Extraocular movements are intact without nystagmus. Pupils equally round and reactive to light  V:  Facial sensation V1-V3 equal and intact   VII: Face is symmetric with normal eye closure and smile  VIII: Hearing is bilaterally intact to finger rub  XII: Tongue protrudes midline  Motor: Normal bulk and tone. No pronator drift. Strength bilateral upper extremity 5/5, bilateral lower extremities 5/5.  Sensation: Intact to light touch bilaterally. No neglect or extinction on double simultaneous testing.  Coordination: No dysmetria on finger-to-nose and heel-to-shin bilaterally  Reflexes: Downgoing toes bilaterally   Gait: Narrow gait and steady  NIHSS at discharge: 0    New medications on discharge: aspirin 81mg daily, plavix 75 mg daily, atorvastatin 40 mg daily, Midodrine 2.5mg Q8H  Labs to be followed up:   Imaging to be done as outpatient:  Further outpatient workup: ILR placement   71y Female with no past medical history but recent extensive travel from [place] who presented left sided hemiparesis on 5/29.  On immediate presentation to the ED, NIHSS 4 which improved to NIHSS 0 after CTH scans. CTH with no hemorrhage. CTP with elevated Tmax in the R M2 region. CTA head and neck with right M2 occlusions.  Patient was no longer a tpa candidate due to return to baseline. Admitted to MICU for thrombectomy watch and started on pressors to maintain cerebral perfusion and heparin drip. Neurosurgery consulted for surgical bypass work up.  SPECT scan did show reversible defect in R M2 distribution, however MR Angio Head NOVA scan obtained later in her hospital course showed recanalization of M2 occlusion. Therefore, no neurosurgical intervention. Stroke etiology unclear as work up so far negative for stroke source despite thorough workup. She was transitioned from heparin drip to therapeutic lovenox, however as there is no clear indication for anticoagulation, she was transitioned to DAPT x 21 days and then will enrolled in clinical trial ([facility]). Pressors were transitioned to midodrine with plans to down-titrate as patient was no longer pressure dependent. Patient would need a ILR to complete the stroke workup, however as she is visiting abroad with no travelers insurance. She will need to f/u at [facility] for stroke clinic follow up.     During this hospital course, patient had an ischemic infarct in right insula, right frontal seen on MRI.   The stroke etiology is likely secondary to:  [x]etiology workup still in progress    Patient had the following workup done in house:  CT Head 5/29: No acute intracranial abnormality.  MR Head Non Contrast 6/6: Subacute small infarction within the right insula which has evolved from   CT head 5/30/2022. Few additional punctate foci of restricted diffusion in the right frontal lobe consistent with recent infarctions..NM SPECT/CT Brain: Reversible defect in the distribution of the right M2 segment corresponding to an abnormality on recent CT angiographyand perfusion study.  Slightly decreased activity in a portion of the left cerebral hemisphere may be due to a relative steal, that is, increased activity in the uninvolved portion of the right cerebral hemisphere compared to the normal left.  MR Angio Head NOVA: Since prior CTA 5/29/2022, interval recanalization of the right proximal   right M2 segments with volumetric flow rate demonstrated on MRA nova   which is approximately equal to the left MCA. No large vessel occlusion.   CT Angio Head 5/29:  Occlusion of the proximal right M2 segments the   anterior and superior divisions.  CT Angio Neck 5/29: No stenosis or occlusion.  Echo:    1. Normal left ventricular size and systolic function.   2. Left ventricular endocardium is not well visualizaed, unable to   definitively evaluate left ventricular wall motion.   3. Mild symmetric left ventricular hypertrophy.   4. Normal right ventricular size and systolic function.   5. Normal atria.   6. No significant valvular disease.   7. No pericardial effusion.   8. No prior echo is available for comparison.   9. Injection of agitated saline via a peripheral vein reveals no   evidence of a right-to-left shunt.    LALO:   1. Mild to moderate symmetric left ventricular hypertrophy.   2. Normal left ventricular systolic function.   3. Normal right ventricular size and systolic function.   4. No LA/WENDIE thrombus seen.   5. No evidence of an intracardiac shunt.   6. No significant valvular disease.   7. No pericardial effusion.   8. The aortic root is normal in size. There is no significant plaque   seen in the visualized portion of the descending aorta. There is no   significant plaque seen in the visualized portion of the aortic arch.    LE doppler: No evidence of deep venous thrombosis in either lower extremity.    Labs: A1c 5.1, LDL 65, hypercoagable w/u negative     Physical exam at discharge:  Neurologic:  -Mental status: Awake, alert, oriented to person, place, and time. Speech is fluent with intact naming, repetition, and comprehension, no dysarthria. Recent and remote memory intact. Follows commands. Attention/concentration intact. Fund of knowledge appropriate.  -Cranial nerves:   II: Visual fields are full to confrontation.  III, IV, VI: Extraocular movements are intact without nystagmus. Pupils equally round and reactive to light  V:  Facial sensation V1-V3 equal and intact   VII: Face is symmetric with normal eye closure and smile  VIII: Hearing is bilaterally intact to finger rub  XII: Tongue protrudes midline  Motor: Normal bulk and tone. No pronator drift. Strength bilateral upper extremity 5/5, bilateral lower extremities 5/5.  Sensation: Intact to light touch bilaterally. No neglect or extinction on double simultaneous testing.  Coordination: No dysmetria on finger-to-nose and heel-to-shin bilaterally  Reflexes: Downgoing toes bilaterally   Gait: Narrow gait and steady  NIHSS at discharge: 0    New medications on discharge: aspirin 81mg daily, plavix 75 mg daily, atorvastatin 40 mg daily, Midodrine 2.5mg Q8H  Labs to be followed up: hypercoag studies.   Imaging to be done as outpatient:  Further outpatient workup: ILR placement

## 2022-06-09 NOTE — DISCHARGE NOTE PROVIDER - NSDCFUADDAPPT_GEN_ALL_CORE_FT
You need to call and get an appointment with [facility] @ [phone] for further follow up.  You have an initial follow up with [facility] stroke Neurology NP Rita Handy on 07/14 @ 10:20am.  Please make sure you call and get an appointment with [facility] as you need ILR placement. You need to call and get an appointment with [facility] @ [phone] for further follow up.  You have an initial follow up with [facility] stroke Neurology NP Rita Handy on 07/14 @ 10:20am.  Please make sure you call and get an appointment with [facility] as you need ILR placement.    Please take DAPT(Aspirin and Plavix) X 21-30days and after that switch to aspirin alone until you follow up with Neuro @ [facility].

## 2022-06-09 NOTE — DISCHARGE NOTE NURSING/CASE MANAGEMENT/SOCIAL WORK - PATIENT PORTAL LINK FT
You can access the FollowMyHealth Patient Portal offered by [facility] by registering at the following website: [email or web address]. By joining [facility]’s FollowMyHealth portal, you will also be able to view your health information using other applications (apps) compatible with our system.

## 2022-06-09 NOTE — DISCHARGE NOTE PROVIDER - NSDCCPCAREPLAN_GEN_ALL_CORE_FT
PRINCIPAL DISCHARGE DIAGNOSIS  Diagnosis: Stroke  Assessment and Plan of Treatment: During this hospital admission, you had an ischemic stroke. During an ischemic stroke, blood stops flowing to part of your brain because of a blockage in the blood vessel. This can damage areas in the brain that control other parts of the body.  Please take your aspirin and plavix for blood thinning and Atorvastatin for cholesterol medication/blood vessel protection as prescribed to prevent further strokes. Do not skip doses and do not run low on your medication. If you run low on your medication, please contact your doctor.  You will follow up outpatient with the stroke Nurse Practitioner as scheduled below.  It is normal to feel fatigue after a stroke, while some days may be worse than others.  Call 911 right away if you have any of the following symptoms of another stroke:  B: Balance: Sudden: Dizziness, loss of balance, or a sense of falling, difficulty with coordinating movement  E: Eyes: Sudden double vision or trouble seeing in one or both eyes  F: Face: Sudden uneven face  A: Arms (Legs): Sudden weakness, tingling, or loss of feeling on one side of your face or body  S: Speech: Sudden trouble talking or slurred speech, sudden difficulty understanding others  T: Time: Please call 911 right away and go to the emergency room  •Sudden, severe headache  •Blackouts or seizures

## 2022-06-09 NOTE — DISCHARGE NOTE PROVIDER - NSDCMRMEDTOKEN_GEN_ALL_CORE_FT
aspirin 81 mg oral delayed release tablet: 1 tab(s) orally once a day  clopidogrel 75 mg oral tablet: 1 tab(s) orally once a day  midodrine 2.5 mg oral tablet: 1 tab(s) orally every 8 hours   aspirin 81 mg oral delayed release tablet: 1 tab(s) orally once a day  atorvastatin 40 mg oral tablet: 1 tab(s) orally once a day (at bedtime)   clopidogrel 75 mg oral tablet: 1 tab(s) orally once a day  midodrine 2.5 mg oral tablet: 1 tab(s) orally every 8 hours   aspirin 81 mg oral delayed release tablet: 1 tab(s) orally once a day  atorvastatin 80 mg oral tablet: 1 tab(s) orally once a day  clopidogrel 75 mg oral tablet: 1 tab(s) orally once a day  midodrine 2.5 mg oral tablet: 1 tab(s) orally every 8 hours

## 2022-06-09 NOTE — DISCHARGE NOTE PROVIDER - CARE PROVIDER_API CALL
Jennifer Schwab)  Neurology  [address] [address]  Phone: [phone]  Fax: [phone]  Scheduled Appointment: 07/14/2022 10:20 AM

## 2022-06-09 NOTE — PROGRESS NOTE ADULT - SUBJECTIVE AND OBJECTIVE BOX
Physical Medicine and Rehabilitation Progress Note:    Patient is a 71y old  Female who presents with a chief complaint of R MCA infarct (03 Jun 2022 14:53)      HPI:  HPI: 71y Female with no past medical history, not on any meds, just got off a long flight from [place] this past Wednesday presents with left sided hemiparesis. LKW 2:30 PM today when she was with son (Axel [phone], at bedside). Then around 4pm, family found patient with profound left sided weakness. EMS was called. BP with /100. Per EMS, no movement in the left arm nor leg, left facial droop, and right gaze preference and could not overcome.     On presentation to ED, 163/83 with improving exam. AOx3, answering questions and following commands. Right gaze preference and can easily overcome when attention brought to the left side. LUE with drift, does not hit bed. Minor left facial droop (although son says patient's face at baseline), extinguishes to DST on the left. NIHSS 4. CTH with no hemorrhage. CTP with elevated Tmax in the R M2 region. CTA head and neck with proximal occlusion of two posterior right M2. Patient is a tpa candidate as CTH is negative for hemorrhage, within window and although improving exam, with disabling deficits. While mixing tpa, patient exam improved to an NIHSS 0. Drift no longer present, gaze midline and attending bilaterally without preference, sensation intact b/l without extinguishing on DST. Decision made to not give tpa due to return to baseline. Endovascular neurosurgery consulted regarding RM2 occlusion. Patient currently not a thrombectomy candidate due to NIHSS 0 with no focal deficits.     Patient denies CP, SOB, prior episodes of weakness and numbness. She has b/l LE flushing and "heat" occasionally, but never weakness, numbness nor paresthesia.     T(C): --  HR: 59 (05-29-22 @ 17:30) (59 - 59)  BP: 163/83 (05-29-22 @ 17:30) (163/83 - 163/83)  RR: 18 (05-29-22 @ 17:30) (18 - 18)  SpO2: 98% (05-29-22 @ 17:30) (98% - 98%)    PAST MEDICAL & SURGICAL HISTORY:      FAMILY HISTORY:      SOCIAL HISTORY:   Patient currently visiting from [place], staying with son.   Smoking status:  Drinking:  Drug Use:     ROS:   Constitutional: No fever, weight loss or fatigue  Eyes: No eye pain, visual disturbances, or discharge  ENMT:  No difficulty hearing, tinnitus; No sinus or throat pain  Neck: No pain or stiffness  Respiratory: No cough, wheezing, chills or hemoptysis  Cardiovascular: No chest pain, palpitations, shortness of breath, or leg swelling  Gastrointestinal: No abdominal pain. No nausea, vomiting or hematemesis; No diarrhea or constipation. Nohematochezia.  Genitourinary: No dysuria, frequency, hematuria or incontinence  Neurological: As per HPI    MEDICATIONS  (STANDING):  dextrose 50% Injectable 25 milliLiter(s) IV Push Once    MEDICATIONS  (PRN):    Allergies    No Known Allergies    Intolerances      Vital Signs Last 24 Hrs  T(C): --  T(F): --  HR: 59 (29 May 2022 17:30) (59 - 59)  BP: 163/83 (29 May 2022 17:30) (163/83 - 163/83)  BP(mean): --  RR: 18 (29 May 2022 17:30) (18 - 18)  SpO2: 98% (29 May 2022 17:30) (98% - 98%)    Physical exam:  Constitutional: No acute distress, conversant  Eyes: Anicteric sclerae, moist conjunctivae, see below for CNs  Neck: trachea midline, FROM, supple, no thyromegaly or lymphadenopathy  Cardiovascular: Some PVCs on tele, no afib.   Pulmonary: No increased work of breathing. No use of accessory muscles  GI: Abdomen soft, non-distended, non-tender  Extremities: no edema    Neurologic: on immediate presentation to the ED  -Mental status: Awake, alert, oriented to person, place, and time. Speech is fluent with intact naming, repetition, and comprehension, no dysarthria. Recent and remote memory intact. Follows commands. Attention/concentration intact. Fund of knowledge appropriate.  -Cranial nerves:   II: Visual fields are full to confrontation.  III, IV, VI: Extraocular movements are intact without nystagmus. RIght gaze preference but can cross and bury to the left. Can attend to the left easily when attention brought to that side. Pupils equally round and reactive to light  V:  Facial sensation V1-V3 equal and intact   VII: Mild left facial droop   VIII: Hearing is grossly intact.   Motor: Normal bulk and tone. LUE drift, does not hit bed. All other extremities antigravity without drift.   Sensation: Intact to light touch bilaterally. Extinguishes on the left with DST  Coordination: No dysmetria on finger-to-nose bilaterally  NIHSS: 4      Neurologic: after CT scans, in resus room  -Mental status: Awake, alert, oriented to person, place, and time. Speech is fluent with intact naming, repetition, and comprehension, no dysarthria. Recent and remote memory intact. Follows commands. Attention/concentration intact. Fund of knowledge appropriate.  -Cranial nerves:   II: Visual fields are full to confrontation.  III, IV, VI: Extraocular movements are intact without nystagmus. Pupils equally round and reactive to light  V:  Facial sensation V1-V3 equal and intact   VII: Face is symmetric with normal eye closure and smile  VIII: Hearing is grossly intact bilaterally  XII: Tongue protrudes midline  Motor: Normal bulk and tone. No pronator drift. Strength bilateral upper extremity 5/5, bilateral lower extremities 5/5.  Sensation: Intact to light touch bilaterally. No neglect or extinction on double simultaneous testing.  Coordination: No dysmetria on finger-to-nose bilaterally  NIHSS: 0        Fingerstick Blood Glucose: CAPILLARY BLOOD GLUCOSE  78 (29 May 2022 18:13)      POCT Blood Glucose.: 78 mg/dL (29 May 2022 17:31)    LABS:                        14.3   6.23  )-----------( 345      ( 29 May 2022 17:36 )             44.1     05-29    142  |  105  |  17  ----------------------------<  85  4.0   |  27  |  1.17    Ca    9.8      29 May 2022 17:36    TPro  7.8  /  Alb  4.5  /  TBili  0.5  /  DBili  x   /  AST  24  /  ALT  17  /  AlkPhos  99  05-29    PT/INR - ( 29 May 2022 17:36 )   PT: 13.1 sec;   INR: 1.10          PTT - ( 29 May 2022 17:36 )  PTT:29.1 sec  CARDIAC MARKERS ( 29 May 2022 17:36 )  x     / 0.01 ng/mL / x     / x     / x              RADIOLOGY & ADDITIONAL STUDIES:    < from: CT Brain Stroke Protocol (05.29.22 @ 17:43) >  IMPRESSION:  1. No acute intracranial abnormality.  2. ASPECTS ([place]ta Stroke Program Early CT Score) is 10.    < end of copied text >  < from: CT Perfusion w/ Maps w/ IV Cont (05.29.22 @ 17:43) >  IMPRESSION:  1. Moderate size area of right MCA distribution acute ischemia in the  distribution of the M2 branch occlusions.  2. No CT perfusion evidence of acute infarction.    < end of copied text >  < from: CT Angio Head w/ IV Cont (05.29.22 @ 17:43) >  IMPRESSION:  1. Proximal occlusion of two posterior right M2 branches consistent with  thrombosis. Paucity of flow in the posterior right MCA distal to this   level.  2. Otherwise patent intracranial arterial system.    < end of copied text >  < from: CT Angio Neck w/ IV Cont (05.29.22 @ 17:43) >  IMPRESSION:  No stenosis or occlusion.    < end of copied text >    -----------------------------------------------------------------------------------------------------------------  IV-tPA (Y/N):    N                              Bolus time:    Alteplase Dose Verification w/ RN:  Reason IV-tPA not given: rapidly improving exam, back to baseline (29 May 2022 22:54)                            13.5   4.88  )-----------( 220      ( 08 Jun 2022 05:30 )             41.6       06-08    140  |  105  |  12  ----------------------------<  88  4.1   |  25  |  0.82    Ca    9.8      08 Jun 2022 05:30  Phos  3.6     06-08  Mg     2.2     06-08      Vital Signs Last 24 Hrs  T(C): 37.2 (09 Jun 2022 14:10), Max: 37.2 (09 Jun 2022 14:10)  T(F): 98.9 (09 Jun 2022 14:10), Max: 98.9 (09 Jun 2022 14:10)  HR: 64 (09 Jun 2022 13:04) (44 - 64)  BP: 125/58 (09 Jun 2022 13:04) (96/55 - 136/62)  BP(mean): 84 (09 Jun 2022 13:04) (70 - 91)  RR: 18 (09 Jun 2022 13:04) (16 - 19)  SpO2: 98% (09 Jun 2022 13:04) (95% - 100%)    MEDICATIONS  (STANDING):  aspirin enteric coated 81 milliGRAM(s) Oral daily  atorvastatin 80 milliGRAM(s) Oral daily  clopidogrel Tablet 75 milliGRAM(s) Oral daily  enoxaparin Injectable 40 milliGRAM(s) SubCutaneous every 24 hours  midodrine 2.5 milliGRAM(s) Oral every 8 hours  polyethylene glycol 3350 17 Gram(s) Oral daily    MEDICATIONS  (PRN):  acetaminophen     Tablet .. 650 milliGRAM(s) Oral every 6 hours PRN Moderate Pain (4 - 6)    Currently Undergoing Physical/ Occupational Therapy at bedside.    PT/OT Functional Status Assessment:   6/8/2022      Therapeutic Interventions      Sit-Stand Transfer Training  Sit-to-Stand Transfer Training Rehab Potential: good, to achieve stated therapy goals  Sit-to-Stand Transfer Training Symptoms Noted During/After Treatment: none  Transfer Training Sit-to-Stand Transfer: supervision  Transfer Training Stand-to-Sit Transfer: supervision  Sit-to-Stand Transfer Training Transfer Safety Analysis: decreased strength    Gait Training  Gait Training Rehab Potential: fair, will monitor progress closely  Gait Training Symptoms Noted During/After Treatment: fatigue  Gait Training: supervision;  150 feet  Gait Analysis: decreased hardeep;  increased time in double stance;  decreased toe clearance;  decreased strength          PM&R Impression: as above    Current Disposition Plan Recommendations:   d/c home, home PT

## 2022-06-09 NOTE — DISCHARGE NOTE NURSING/CASE MANAGEMENT/SOCIAL WORK - NSDCFUADDAPPT_GEN_ALL_CORE_FT
You need to call and get an appointment with [place] [facility] @ [phone] for further follow up.  You have an initial follow up with [facility] stroke Neurology NP Rita Handy on 07/14 @ 10:20am.  Please make sure you call and get an appointment with [place] [facility] as you need ILR placement.

## 2022-06-09 NOTE — PROGRESS NOTE ADULT - PROVIDER SPECIALTY LIST ADULT
MICU
Neurology
Neurosurgery
Hospitalist
MICU
MICU
Neurology
Neurosurgery
Rehab Medicine
Rehab Medicine
Neurology
Rehab Medicine
Rehab Medicine
MICU
Neurology

## 2022-06-09 NOTE — DISCHARGE NOTE NURSING/CASE MANAGEMENT/SOCIAL WORK - NSDCPEFALRISK_GEN_ALL_CORE
For information on Fall & Injury Prevention, visit: [email or web address] OR  [email or web address] OR  https://www.cdc.gov/steadi/patient.html

## 2022-06-09 NOTE — CHART NOTE - NSCHARTNOTEFT_GEN_A_CORE
Had an extensive discussion with pt and pt's son about the D/C plan. Educated about the need to call [place] stroke clinic to get an appointment ASAP(number listed in the D/C paper work) for F/U and ILR discussion. Also, pt and pt's son was informed, that her appointment with Stroke neuro NP is on 7/14 which is 5weeks from D/C, so to continue DAPT(asa + plavix) only for 21-30days and then to switch to aspirin alone until follow up. If pt is able to get an earlier appointment @ [place] advised to please follow up as it is necessary to continue to follow up post stroke to prevent further strokes or complications. Pt and pts son(Maxx William) verbalised understanding of the need for follow up as well as DAPT X 21-30days and then aspirin alone until further follow up.
Admitting Diagnosis:   Patient is a 71y old  Female who presents with a chief complaint of R MCA infarct (03 Jun 2022 14:53)    PAST MEDICAL & SURGICAL HISTORY:  No pertinent past medical history    No significant past surgical history    Current Nutrition Order: Regular diet    PO Intake: Good (%) [ x ]  Fair (50-75%) [   ] Poor (<25%) [   ]    GI Issues: Denies N/V/D/C, last BM 6/6/22 per flowsheets    Pain: Denies pain    Skin Integrity: WDL    Blade Score: 22    Labs:   06-07    142  |  108  |  17  ----------------------------<  91  4.1   |  23  |  0.89    Ca    9.6      07 Jun 2022 05:30  Phos  4.0     06-07  Mg     2.2     06-07    CAPILLARY BLOOD GLUCOSE    Medications:  MEDICATIONS  (STANDING):  atorvastatin 80 milliGRAM(s) Oral daily  enoxaparin Injectable 80 milliGRAM(s) SubCutaneous every 12 hours  midodrine 10 milliGRAM(s) Oral every 8 hours  polyethylene glycol 3350 17 Gram(s) Oral daily    MEDICATIONS  (PRN):  acetaminophen     Tablet .. 650 milliGRAM(s) Oral every 6 hours PRN Moderate Pain (4 - 6)    Weight: 83.4 kg, Height: 5'8, BMI 28kg/m2     Weight Change: No new weights to assess. Reports usual body weight 70 kg (dosing weight 183 pounds; 83 kg). Verbalizes no recent weight loss; ? accuracy of CBW.    Estimated energy needs: IBW 69.8kg (154lbs) +/-10%. Based on Standards of Care pt >% IBW thus ideal body weight used for all calculations. Needs adjusted for advanced age.  Estimated Energy Needs From (iban/kg) 20  Estimated Energy Needs To (iban/kg) 25  Estimated Energy Needs Calculated From (iban/kg) 1396  Estimated Energy Needs Calculated To (iban/kg) 1745    Estimated Protein Needs From (g/kg) 1  Estimated Protein Needs To (g/kg) 1.2  Estimated Protein Needs Calculated From (g/kg) 69.8  Estimated Protein Needs Calculated To (g/kg) 83.76    Estimated Fluid Needs From (ml/kg) 30  Estimated Fluid Needs To (ml/kg) 35  Estimated Fluid Needs Calculated From (ml/kg) 2094  Estimated Fluid Needs Calculated To (ml/kg) 2443    Subjective: 72 y/o F with no pmh and recent long flight from [place] ([place]) on Wednesday presented to [facility] on 5/29 with L hemiparesis, L facial droop and R gaze deviation. Initial NIHSS 4. Initial CTH without acute abnormality. CTP with elevated Tmax in the R M2 region. CTA head and neck with proximal occlusion of two posterior right M2. Patient improved to NIHSS 0 so no TPA was given. Repeat CTH on 5/29 with small hypodensity in right insula suspicious for acute infarct. Patient not a thrombectomy candidate. Currently awaiting MR NOVA.    Pt was previously on DASH/TLC diet, now on Regular diet which pt states she is tolerating well. Pt reports good appetite and is completing >75% of meals. Obtained pt preferences and will honor as able. Will continue to follow-up per department protocol.     Previous Nutrition Diagnosis: Nutrition Diagnositc Terminology #1  Increased Nutrient Needs (protein needs) RT physiological demand for nutrient AEB advanced age  Goal/Expected Outcome  Pt to meet at least 75% of nutritional needs during hospital stay consistently    Recommendations:  1. Continue Regular diet. Obtained pt preferences and will honor as able.  2. Encourage pt to continue meet nutritional needs   3. Monitor labs, GI distress, skin integrity. Trend weekly weights.   4. Nutrition to align with GOC at all times.    Risk Level: High [   ] Moderate [ x  ] Low [   ]

## 2022-06-09 NOTE — PROGRESS NOTE ADULT - ASSESSMENT
per Neurology    70 y/o F with no pmh and recent long flight from [place] ([facility]) on Wednesday presented to [facility] on 5/29 with L hemiparesis, L facial droop and R gaze deviation. Initial NIHSS 4. Initial CTH without acute abnormality. CTP with elevated Tmax in the R M2 region. CTA head and neck with proximal occlusion of two posterior right M2. Patient improved to NIHSS 0 so no TPA was given. Repeat CTH on 5/29 with small hypodensity in right insula suspicious for acute infarct. Patient not a thrombectomy candidate. Underwent cerebral bypass testing, MR BENITEZ demonstrated recanalized R M2 segment, no intervention per NSGY. Pending discharge home 6/9.     Neuro  #CVA workup  - switch therapeutic lovenox to aspirin 81mg and plavix 75mg on 6/9  - q4hr stroke neuro checks and vitals  - MR NOVA 6/6 - recanalized RM2  - SPECT: reversible defect in RM2. Slight decreased activity in L cerebral hemisphere due to relative steal.   - continue Lovenox 80mg BID for now, dependent on the MR NOVA results may consider switching to DAPT  - Stroke Code HCT Results: no acute abnormality --> Interval development of a small focal hypodensity in the right insula suspicious for acute infarction. No mass effect or hemorrhagic transformation.  - Stroke Code CTA Results: Proximal occlusion of two posterior right M2 branches consistent with thrombosis. Paucity of flow in the posterior right MCA distal to this level.  - Stroke education    Cards  - Goal -180  - spoke with EP about ILR - they are concerned about financial burden given patient has no insurance and going to [place] after 6 months here. Family is looking into travelers insurance and SW says patient qualifies for emergency Medicaid, however, that will only cover inpatient hospital visit. Other option is for [facility] Patch; this will also not be covered by emergency Medicaid - need to have discussion with family about cost and ability to cover. Unable to facilitate outpatient heart monitoring at [facility], will provide f/u appointment with [place] clinic for further management.   - midodrine weaned to 2.5mg q8h for orthostatic hypotension  - continue compression stockings  - maintain blood pressures >110  - TTE: no PFO, EF 65%  - LALO: no LA thrombus  - LDL results: 65  - c/w atorvastatin 80mg  - LE doppler neg  - hypercoag panel wnl    Pulm  - call provider if SPO2 < 94%    GI  #Nutrition/Fluids/Electrolytes   - replete K<4 and Mg <2  - Diet: DASH  - IVF: n/a    Renal  - daily BMP    Endocrine  - A1C results: 5.1  - TSH results: 1.04    DVT Prophylaxis  - SCDs & therapeutic lovenox

## 2022-06-10 PROBLEM — Z78.9 OTHER SPECIFIED HEALTH STATUS: Chronic | Status: ACTIVE | Noted: 2022-05-30

## 2022-06-13 DIAGNOSIS — I95.2 HYPOTENSION DUE TO DRUGS: ICD-10-CM

## 2022-06-13 DIAGNOSIS — G81.92 HEMIPLEGIA, UNSPECIFIED AFFECTING LEFT DOMINANT SIDE: ICD-10-CM

## 2022-06-13 DIAGNOSIS — I63.333 CEREBRAL INFARCTION DUE TO THROMBOSIS OF BILATERAL POSTERIOR CEREBRAL ARTERIES: ICD-10-CM

## 2022-06-13 DIAGNOSIS — R29.704 NIHSS SCORE 4: ICD-10-CM

## 2022-06-13 DIAGNOSIS — R00.1 BRADYCARDIA, UNSPECIFIED: ICD-10-CM

## 2022-06-13 DIAGNOSIS — I69.392 FACIAL WEAKNESS FOLLOWING CEREBRAL INFARCTION: ICD-10-CM

## 2022-06-16 PROBLEM — Z00.00 ENCOUNTER FOR PREVENTIVE HEALTH EXAMINATION: Status: ACTIVE | Noted: 2022-06-16

## 2022-06-24 NOTE — ED ADULT NURSE NOTE - NSFALLRSKINDICATORS_ED_ALL_ED
Spoke with Yara at [facility] about patient transfer. Gave report. Patient will be admitted to room 3160 and Nurse will be Haw[facility]. Yara will call back when patient that is presently in room 3160 has discharged.   yes

## 2022-07-14 ENCOUNTER — APPOINTMENT (OUTPATIENT)
Dept: NEUROLOGY | Facility: CLINIC | Age: 72
End: 2022-07-14

## 2022-07-14 VITALS
HEIGHT: 67 IN | DIASTOLIC BLOOD PRESSURE: 74 MMHG | TEMPERATURE: 97.9 F | SYSTOLIC BLOOD PRESSURE: 135 MMHG | BODY MASS INDEX: 27.47 KG/M2 | HEART RATE: 61 BPM | WEIGHT: 175 LBS | OXYGEN SATURATION: 97 %

## 2022-07-14 VITALS — DIASTOLIC BLOOD PRESSURE: 85 MMHG | HEART RATE: 63 BPM | SYSTOLIC BLOOD PRESSURE: 140 MMHG

## 2022-07-14 VITALS — SYSTOLIC BLOOD PRESSURE: 137 MMHG | DIASTOLIC BLOOD PRESSURE: 83 MMHG | HEART RATE: 53 BPM

## 2022-07-14 VITALS — DIASTOLIC BLOOD PRESSURE: 76 MMHG | HEART RATE: 59 BPM | SYSTOLIC BLOOD PRESSURE: 127 MMHG

## 2022-07-14 DIAGNOSIS — Z78.9 OTHER SPECIFIED HEALTH STATUS: ICD-10-CM

## 2022-07-14 DIAGNOSIS — I63.9 CEREBRAL INFARCTION, UNSPECIFIED: ICD-10-CM

## 2022-07-14 RX ORDER — ATORVASTATIN CALCIUM 40 MG/1
40 TABLET, FILM COATED ORAL
Qty: 30 | Refills: 3 | Status: ACTIVE | COMMUNITY
Start: 2022-07-14 | End: 1900-01-01

## 2022-07-14 RX ORDER — MIDODRINE HYDROCHLORIDE 2.5 MG/1
2.5 TABLET ORAL 3 TIMES DAILY
Qty: 90 | Refills: 3 | Status: ACTIVE | COMMUNITY
Start: 2022-07-14 | End: 1900-01-01

## 2022-07-14 RX ORDER — MIDODRINE HYDROCHLORIDE 2.5 MG/1
2.5 TABLET ORAL
Qty: 90 | Refills: 0 | Status: ACTIVE | COMMUNITY
Start: 2022-07-09

## 2022-07-14 RX ORDER — ATORVASTATIN CALCIUM 80 MG/1
80 TABLET, FILM COATED ORAL
Qty: 30 | Refills: 0 | Status: ACTIVE | COMMUNITY
Start: 2022-07-09

## 2022-07-14 NOTE — ASSESSMENT
[FreeTextEntry1] : 71y Female with no past medical history but recent extensive travel from [place]\par \par who is here for followup after recent hospitalization for left sided hemiparesis on 5/29 secondary to R M2 occlusion. NIHSS 4-->0. Admitted to MICU for thrombectomy and SPECT scan did show reversible defect in R M2 distribution, however MR Angio Head NOVA scan obtained later in her hospital course showed\par recanalization of M2 occlusion. Therefore, no neurosurgical intervention.\par \par Patient is doing well, still have LUE weakness proximally > distally, with L sided visual field defect. L shoulder subluxation noted. Stroke seems to be embolic in etiology. \par \par Plan:\par -C/w ASA 81 mg daily\par -C/w Atorvastatin, will decrease Lipitor to 40 mg daily\par - A1c 5.1, LDL 65, hypercoagulable w/u negative\par -TTE/LALO negative for thrombus or shunt\par -Continue midodrine 2.5 mg tid, BP target 120-150. Monitor BP 3x/week\par -Plan to take her off midodrine before going back to her hometown in October\par -Patient to be enrolled in clinical trial ([employer]), discussion was made with Quinn (clinical research coordinator)\par -PT referral for LUE weakness and L shoulder subluxation\par -Follow up in 2 months\par -Extensive discussion was done about life style modification, and stroke education provided.\par

## 2022-07-14 NOTE — HISTORY OF PRESENT ILLNESS
[FreeTextEntry1] : Patient reports feeling better, she reports that her strength is almost back to nishant, but still feel mild weakness over the L side. She was on DAPT, stopped Plavix last week. Tolerating ASA 81 mg with no hematuria or blood in stools. BP has been ranging in 130-140s. \par \par HPI:\par 71y Female with no past medical history but recent extensive travel from [place]\par who presented left sided hemiparesis on 5/29. On immediate presentation to the\par ED, NIHSS 4 which improved to NIHSS 0 after CTH scans. CTH with no hemorrhage.\par CTP with elevated Tmax in the R M2 region. CTA head and neck with right M2\par occlusions. 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 Few additional punctate foci of restricted diffusion in the\par right frontal lobe consistent with recent infarctions..NM SPECT/CT Brain:\par Reversible defect in the distribution of the right M2 segment corresponding to\par an abnormality on recent CT angiographyand perfusion study. Slightly decreased\par activity in a portion of the left cerebral hemisphere may be due to a relative\par steal, that is, increased activity in the uninvolved portion of the right\par cerebral hemisphere compared to the normal left.\par -MR Angio Head NOVA: Since prior CTA 5/29/2022, interval recanalization of the\par right proximal\par right M2 segments with volumetric flow rate demonstrated on MRA nova\par which is approximately equal to the left MCA. No large vessel occlusion.\par -CT Angio Head 5/29: Occlusion of the proximal right M2 segments the\par anterior and superior divisions.\par -CT Angio Neck 5/29: No stenosis or occlusion.\par -Echo:\par  1. Normal left ventricular size and systolic function.\par  2. Left ventricular endocardium is not well visualizaed, unable to\par definitively evaluate left ventricular wall motion.\par  3. Mild symmetric left ventricular hypertrophy.\par  4. Normal right ventricular size and systolic function.\par  5. Normal atria.\par  6. No significant valvular disease.\par  7. No pericardial effusion.\par  8. No prior echo is available for comparison.\par  9. Injection of agitated saline via a peripheral vein reveals no\par evidence of a right-to-left shunt.\par \par -LALO:\par  1. Mild to moderate symmetric left ventricular hypertrophy.\par  2. Normal left ventricular systolic function.\par  3. Normal right ventricular size and systolic function.\par  4. No LA/WENDIE thrombus seen.\par  5. No evidence of an intracardiac shunt.\par  6. No significant valvular disease.\par  7. No pericardial effusion.\par  8. The aortic root is normal in size. There is no significant plaque\par seen in the visualized portion of the descending aorta. There is no\par significant plaque seen in the visualized portion of the aortic arch.\par \par -LE doppler: No evidence of deep venous thrombosis in either lower extremity.\par \par -Labs: A1c 5.1, LDL 65, hypercoagable w/u negative\par

## 2022-07-14 NOTE — REVIEW OF SYSTEMS
[As Noted in HPI] : as noted in HPI [Negative] : Heme/Lymph [de-identified] : shoulder subluxation related left shoulder pain

## 2022-07-14 NOTE — REASON FOR VISIT
[Post Hospitalization] : a post hospitalization visit [FreeTextEntry1] : Here for research study for RADHA trial

## 2022-07-14 NOTE — PHYSICAL EXAM
[FreeTextEntry1] : -Mental status: Awake, alert, oriented to person, place, and time. Speech is\par fluent with intact naming, repetition, and comprehension, no dysarthria. Recent\par and remote memory intact. Follows commands. Attention/concentration intact.\par Fund of knowledge appropriate.\par -Cranial nerves:\par II: L visual field defect to DSE on the left . \par III, IV, VI: Extraocular movements are intact without nystagmus. Pupils equally\par round and reactive to light\par V: Facial sensation V1-V3 equal and intact\par VII: Face is symmetric with normal eye closure and smile\par VIII: Hearing is bilaterally intact to finger rub\par XII: Tongue protrudes midline\par Motor: Normal bulk and tone. No pronator drift. Strength bilateral upper\par extremity 4/5 proximally and 5/5 distally, bilateral lower extremities 5/5.\par Sensation: Intact to light touch bilaterally. No neglect or extinction on\par double simultaneous testing.\par Coordination: No dysmetria on finger-to-nose and heel-to-shin bilaterally\par Reflexes: Downgoing toes bilaterally\par Gait: Narrow gait and steady. no circumduction\par NIHSS 2

## 2022-07-25 ENCOUNTER — TRANSCRIPTION ENCOUNTER (OUTPATIENT)
Age: 72
End: 2022-07-25

## 2022-09-02 VITALS
TEMPERATURE: 97.4 F | SYSTOLIC BLOOD PRESSURE: 109 MMHG | HEIGHT: 67 IN | BODY MASS INDEX: 26.53 KG/M2 | HEART RATE: 60 BPM | OXYGEN SATURATION: 98 % | WEIGHT: 169 LBS | DIASTOLIC BLOOD PRESSURE: 67 MMHG

## 2022-09-02 DIAGNOSIS — Z00.6 ENCOUNTER FOR EXAMINATION FOR NORMAL COMPARISON AND CONTROL IN CLINICAL RESEARCH PROGRAM: ICD-10-CM

## 2022-09-02 RX ORDER — ASPIRIN 81 MG/1
81 TABLET, COATED ORAL
Qty: 30 | Refills: 3 | Status: DISCONTINUED | COMMUNITY
Start: 2022-07-09 | End: 2022-09-02

## 2022-09-15 PROBLEM — Z00.6 RESEARCH STUDY PATIENT: Status: ACTIVE | Noted: 2022-09-15

## 2022-09-15 NOTE — HISTORY OF PRESENT ILLNESS
[FreeTextEntry1] : follow up for RADHA research visit for randomization. \par Doing well. currently has zio patch placed by cardiologist at [place]

## 2022-10-27 RX ORDER — ROSUVASTATIN CALCIUM 20 MG/1
20 TABLET, FILM COATED ORAL DAILY
Qty: 90 | Refills: 3 | Status: ACTIVE | COMMUNITY
Start: 2022-10-27 | End: 1900-01-01

## 2022-12-16 RX ORDER — ASPIRIN ENTERIC COATED TABLETS 81 MG 81 MG/1
81 TABLET, DELAYED RELEASE ORAL DAILY
Qty: 90 | Refills: 3 | Status: ACTIVE | OUTPATIENT
Start: 2022-12-16

## 2022-12-16 RX ORDER — CLOPIDOGREL BISULFATE 75 MG/1
75 TABLET, FILM COATED ORAL
Qty: 30 | Refills: 0 | Status: DISCONTINUED | COMMUNITY
Start: 2022-07-09 | End: 2022-12-16
